# Patient Record
Sex: MALE | Race: WHITE | NOT HISPANIC OR LATINO | Employment: OTHER | URBAN - METROPOLITAN AREA
[De-identification: names, ages, dates, MRNs, and addresses within clinical notes are randomized per-mention and may not be internally consistent; named-entity substitution may affect disease eponyms.]

---

## 2017-04-07 ENCOUNTER — RECORDS - HEALTHEAST (OUTPATIENT)
Dept: LAB | Facility: CLINIC | Age: 52
End: 2017-04-07

## 2017-04-07 LAB
CHOLEST SERPL-MCNC: 183 MG/DL
FASTING STATUS PATIENT QL REPORTED: ABNORMAL
HDLC SERPL-MCNC: 47 MG/DL
LDLC SERPL CALC-MCNC: 105 MG/DL
PSA SERPL-MCNC: 0.8 NG/ML (ref 0–3.5)
TRIGL SERPL-MCNC: 156 MG/DL

## 2017-11-20 ENCOUNTER — HOSPITAL ENCOUNTER (OUTPATIENT)
Dept: BEHAVIORAL HEALTH | Facility: CLINIC | Age: 52
Discharge: HOME | End: 2017-11-20
Attending: SOCIAL WORKER | Admitting: SOCIAL WORKER
Payer: COMMERCIAL

## 2017-11-20 VITALS
DIASTOLIC BLOOD PRESSURE: 80 MMHG | SYSTOLIC BLOOD PRESSURE: 131 MMHG | HEART RATE: 64 BPM | HEIGHT: 70 IN | WEIGHT: 198.4 LBS | BODY MASS INDEX: 28.4 KG/M2

## 2017-11-20 PROCEDURE — H0001 ALCOHOL AND/OR DRUG ASSESS: HCPCS

## 2017-11-20 RX ORDER — BUPRENORPHINE AND NALOXONE 4; 1 MG/1; MG/1
1 FILM, SOLUBLE BUCCAL; SUBLINGUAL DAILY
Status: ON HOLD | COMMUNITY
End: 2018-07-21

## 2017-11-20 ASSESSMENT — ANXIETY QUESTIONNAIRES
6. BECOMING EASILY ANNOYED OR IRRITABLE: SEVERAL DAYS
GAD7 TOTAL SCORE: 1
1. FEELING NERVOUS, ANXIOUS, OR ON EDGE: NOT AT ALL
7. FEELING AFRAID AS IF SOMETHING AWFUL MIGHT HAPPEN: NOT AT ALL
4. TROUBLE RELAXING: NOT AT ALL
5. BEING SO RESTLESS THAT IT IS HARD TO SIT STILL: NOT AT ALL
2. NOT BEING ABLE TO STOP OR CONTROL WORRYING: NOT AT ALL
3. WORRYING TOO MUCH ABOUT DIFFERENT THINGS: NOT AT ALL

## 2017-11-20 ASSESSMENT — PATIENT HEALTH QUESTIONNAIRE - PHQ9: SUM OF ALL RESPONSES TO PHQ QUESTIONS 1-9: 5

## 2017-11-20 ASSESSMENT — PAIN SCALES - GENERAL: PAINLEVEL: SEVERE PAIN (6)

## 2017-11-20 NOTE — PROGRESS NOTES
Rule 25 Assessment  Background Information   1. Date of Assessment Request  2. Date of Assessment  11/20/2017 3. Date Service Authorized     4.   WILL Courtney/LAKEISHA    5.  Phone Number   [phone] 6. Referent  Self 7. Assessment Site  [facility]     8. Client Name   Mark Berumen 9. Date of Birth  1965 Age  52 year old 10. Gender  male  11. PMI/ Insurance No.  [id number]   12. Client's Primary Language:  English 13. Do you require special accommodations, such as an  or assistance with written material? No   14. Current Address: [facility] [address] [facility] [address] [place] [address]   15. Client Phone Numbers: [phone] (home)      16. Tell me what has happened to bring you here today.    The patient came to [facility] for evaluation of possible chemical dependency.  The reason for the CD evaluation was due to the patient's own awareness that he needed help and due to pressure from his girlfriend for him to get help.  The patient reported having multiple issues of substance abuse, mental health issues, chronic health problems as well as significant relationship conflict with his girlfriend of the past 7 years who moved out of the home they had shared in early 11/2017.  The patient has chronic pain issues and he had been working with a [facility] until he was discharged from that program due to having UA's come back positive for non-prescribed mood altering chemicals.  The patient reported he had been abusing his prescribed opiate pain medications and Adderall as well as obtaining and using non-prescribed medications to supplement his substance abuse.  The patient has  from his girlfriend of the past 7 years since early 11/2017 in large part due to his ongoing addiction issues, including taking some of her prescribed medications (Adderall and Oxycodone) without her consent off and on over the past 2-months.  The patient reported his  girlfriend felt very strongly that he needed to enter a residential CD treatment, but he was unwilling to consider a residential CD treatment program at this time.  The patient stated he wanted to enter the Evening Outpatient program at [address] [place] [address] or [facility] for primary CD treatment, so referrals will be made to both of those programs.    ADDENDUM ON 11/27/2017:  The patient called this counselor today and has requested a referral to the residential CD treatment program at [facility] rather than an [facility] CD treatment program as he had initially requested.  The patient had continued to drink alcohol on a daily basis since meeting with this counselor on 11/20/2017 with his last use of alcohol being earlier this morning on 11/27/2017 when he reported drinking 2 beers.  The had also relapsed with Oxycodone over the past 7 days with his last use being earlier this morning on 11/27/2017 when he reported taking two 10 mg tablets of non-prescribed Oxycodone.  This Rule 25 form will be re-faxed to [facility] and to [facility] with the updated referral.    17. Have you had other rule 25 assessments?     Yes. When, Where, and What circumstances: His last CD evaluation was prior to his  CD treatment at [facility] around 5 years ago.    DIMENSION I - Acute Intoxication /Withdrawal Potential   1. Chemical use most recent 12 months outside a facility and other significant use history (client self-report)              X = Primary Drug Used   Age of First Use Most Recent Pattern of Use and Duration   Need enough information to show pattern (both frequency and amounts) and to show tolerance for each chemical that has a diagnosis   Date of last use and time, if needed   Withdrawal Potential? Requiring special care Method of use  (oral, smoked, snort, IV, etc)      Alcohol     15 HU: [age], when he reported a pattern of drinking a few beers and 1 pint of Tequila on a daily basis.    From 7/2017 until 9/2017, he reported a  pattern of drinking 6 beers and occasionally 1 pint of Tequila on a daily basis.    Since 9/2017, he reported a pattern of drinking a few beers or a pint of Tequila around 1 time per week on average.    UPDATE ON 11/27/2017:  Since participating in the CD evaluation on 11/20/2017, he reported a pattern of drinking 4-6 beers and several shots of Tequila on a daily basis.    11/27/2017    (2 beers until 11 am)   None Oral      Marijuana/  Hashish   15 HU: [age], when he reported a pattern of smoking a few bowls of THC on a daily basis.    Within the past year, he reported using THC on around 50 occasions and a portion of those 50 occasions were when he had consumed THC in brownies or other edibles in an attempt to manage his chronic pain issues.    2 months ago None Smoke      Cocaine/Crack     19 HU: 19-26, when he reported a pattern of snorting 1/2 gram of powder cocaine on an almost daily basis.    He reported having no use of cocaine since age 34.   34 None Snort      Meth/  Amphetamines   22        45 He reported snorting methamphetamine on 12 occassions in his life.    HU: Past 3-months, when he reported a pattern of snorting 4 tablets of his prescribed Adderall supplemented with Adderall he was obtaining elsewhere on a daily basis.    Prior to 3-months, he reported taking his prescribed Adderall mostly as prescribed with occasional overuse or abuse of his prescribed Adderall between 5-10 times per month on average.    24        11/16/2017    (1 tablet) None        None Snort        Oral and snorted      Heroin     N/A          X Other Opiates/  Synthetics   46 HU: For a 6-week period of time at age 46, when he reported a pattern of using up to 30 tablets of Oxycodone on a daily basis.    Prior 8/2017, he reported a pattern of using 4 tablets of his prescribed Percocet on a daily basis.    He reported overusing up to 6-7 tablets of prescribed Percocet supplemented with non-prescribed opiate pain medication he  was getting off of the street a few times per week on average.    He was taken off of his prescribed Percocet in 8/2017 due to having positive UA's for non-prescribed mood altering chemicals.    Since 8/2017, he had been prescribed 8 mg of Suboxone and sometimes he would take additional Buprenorphine he was getting from a friend to supplement his use.    He also reported stopping the use of his prescribed Suboxone for a few days at a time when he would take non-prescribed opiate pain medications a few times per month, when he reported taking between 3-4 tablets of non-prescribed Oxycodone per day.    The patient's prescribed Suboxone ran out a month ago, but he had taking non-prescribed Buprenorphine on 4-5 occassions in the past month.    UPDATE ON 11/27/2017:  Since participating in the CD evaluation on 11/20/2017, he reported a pattern of using between 1-2 (10 mg) tablets of non-prescribed Oxycodone over the past 7 days.    11/27/2017    (two 10 mg tablets of non-RX'd Oxycodone at 10 am)   None Oral      Inhalants     N/A           Benzodiazepines     N/A           Hallucinogens     18 LSD: 12 times in life.  Mushrooms: 3-4 times in life. 23 None Oral      Barbiturates/  Sedatives/  Hypnotics N/A           Over-the-Counter Drugs   N/A           Other     21 MDMA: 100 times in life between the ages of 21 and 24. 24 None Oral      Nicotine     10 He reported a current pattern of smoking 1 pack of cigarettes on a daily basis.   11/27/2017 None Smoke     2. Do you use greater amounts of alcohol/other drugs to feel intoxicated or achieve the desired effect?  Yes.  Or use the same amount and get less of an effect?  Yes.  Example: The patient reported using increased amounts of opiate pain medications.    3A. Have you ever been to detox? Yes    3B. When was the first time? 2009    3C. How many times since then? 5    3D. Date of most recent detox: 5 years ago    4.  Withdrawal symptoms: Have you had any of the following  withdrawal symptoms?  Past 12 months Recent (past 30 days)   Sweating (Rapid Pulse)  Shaky / Jittery / Tremors  Headache  Fatigue / Extremely Tired  Sad / Depressed Feeling  Muscle Aches  Irritability  High Blood Pressure  Nausea / Vomiting  Diarrhea  Diminished Appetite  Unable to Eat None     's Visual Observations and Symptoms: No visible withdrawal symptoms at this time    Based on the above information, is withdrawal likely to require attention as part of treatment participation?  No    Dimension I Ratings   Acute intoxication/Withdrawal potential - The placing authority must use the criteria in Dimension I to determine a client s acute intoxication and withdrawal potential.    RISK DESCRIPTIONS - Severity ratin    REASONS SEVERITY WAS ASSIGNED (What about the amount of the person s use and date of most recent use and history of withdrawal problems suggests the potential of withdrawal symptoms requiring professional assistance? )     UPDATE ON 2017:    The patient may have some mild to moderate symptoms of withdrawal as he had been back to drinking alcohol and using non-prescribed Oxycodone on a daily basis for the past 7 days.  His last use of both was earlier this morning on 2017 so he reported he had not yet started to experience any symptoms of withdrawal.  The patient would benefit from completing a detoxification on his own prior to CD treatment if he is able or under medical supervision at [facility] as available.  The patient was instructed to go to an Emergency Department if he started to experience significant withdrawal symptoms to be evaluated for an IP detoxification admission.         DIMENSION II - Biomedical Complications and Conditions   1. Do you have any current health/medical conditions?(Include any infectious diseases, allergies, or chronic or acute pain, history of chronic conditions)       Yes.   Illnesses/Medical Conditions you are receiving care for:   Past  Medical History:   Diagnosis Date     ADD (attention deficit disorder)      Arthritis      Chronic pain      Substance abuse     Etoh     Uncomplicated asthma      2. Do you have a health care provider? When was your most recent appointment? What concerns were identified?     The patient's PCP is Dr. Schultz.    3. If indicated by answers to items 1 or 2: How do you deal with these concerns? Is that working for you? If you are not receiving care for this problem, why not?      The patient is taking medications as prescribed.    4A. List current medication(s) including over-the-counter or herbal supplements--including pain management:     Current Outpatient Prescriptions   Medication     buprenorphine HCl-naloxone HCl (SUBOXONE) 4-1 MG per film     Amphetamine-Dextroamphetamine (ADDERALL PO)     No current facility-administered medications for this encounter.      4B. Do you follow current medical recommendations/take medications as prescribed?     No, please explain: He had been abusing his prescribed Adderall.    4C. When did you last take your medication?     Month ago was the last time he took his prescribed Suboxone.    5. Has a health care provider/healer ever recommended that you reduce or quit alcohol/drug use?     Yes    6. Are you pregnant?     No    7. Have you had any injuries, assaults/violence towards you, accidents, health related issues, overdose(s) or hospitalizations related to your use of alcohol or other drugs:     Yes, explain: He has had several IP detoxification admissions.    8. Do you have any specific physical needs/accommodations? No    Dimension II Ratings   Biomedical Conditions and Complications - The placing authority must use the criteria in Dimension II to determine a client s biomedical conditions and complications.   RISK DESCRIPTIONS - Severity ratin Client tolerates and mark with physical discomfort and is able to get the services that the client needs.    REASONS SEVERITY  WAS ASSIGNED (What physical/medical problems does this person have that would inhibit his or her ability to participate in treatment? What issues does he or she have that require assistance to address?)    The patient reported having a history of the above medical issues and he denied having any other history of chronic medical problems.  The patient had been taking the above medications, but he denied taking either of those medications at this time.  He plans to discontinue his use of Adderall and he is uncertain if he will eventually resume his use of his prescribed Suboxone.  The patient would benefit from following all of the recommendations of his medical providers.          DIMENSION III - Emotional, Behavioral, Cognitive Conditions and Complications   1. (Optional) Tell me what it was like growing up in your family. (substance use, mental health, discipline, abuse, support)     Family History   Problem Relation Age of Onset     Substance Abuse Father      Substance Abuse Paternal Grandfather      Substance Abuse Sister      Substance Abuse Brother      Depression Brother      Anxiety Disorder Brother      Substance Abuse Son      2. When was the last time that you had significant problems...  A. with feeling very trapped, lonely, sad, blue, depressed or hopeless  about the future? Past Month    B. with sleep trouble, such as bad dreams, sleeping restlessly, or falling  asleep during the day? 1+ years ago    C. with feeling very anxious, nervous, tense, scared, panicked, or like  something bad was going to happen? 1+ years ago    D. with becoming very distressed and upset when something reminded  you of the past? Past Month    E. with thinking about ending your life or committing suicide? Never    3. When was the last time that you did the following things two or more times?  A. Lied or conned to get things you wanted or to avoid having to do  something? Past Month    B. Had a hard time paying attention at  school, work, or home? Past Month      C. Had a hard time listening to instructions at school, work, or home? Past Month    D. Were a bully or threatened other people? Never    E. Started physical fights with other people? Never    Note: These questions are from the Global Appraisal of Individual Needs--Short Screener. Any item marked  past month  or  2 to 12 months ago  will be scored with a severity rating of at least 2.     For each item that has occurred in the past month or past year ask follow up questions to determine how often the person has felt this way or has the behavior occurred? How recently? How has it affected their daily living? And, whether they were using or in withdrawal at the time?    The patient reported having a history of Depression NOS in the remote past, but he denied having any recent formal mental health diagnosis and he last took psychotropic medications in 2012.  The patient reported he had been working with a 1:1 mental health therapist on a weekly basis for the past 6-months to help address some of his personal issues.  The patient has difficulty with impulse control and he lacks sober coping skills.  The patient reported having a history of trauma issues related to his best friend killing himself when he was just 20 years old, related to his brother passing away suddenly and unexpectedly from an accidental drug overdose in 9/2017, and related to being severely injured 3 years ago when a car he was working on fell on him.  The patient also reported having some grief and loss issues regarding the recent separation from his girlfriend of the past 7 years in early 11/2017 and due to being away from the 6 year old triplets he had been helping his girlfriend raise since they were born.  The patient reported having significant grief and loss issues regarding his brother's death.  The patient denied having any history of SIB, suicide attempts or suicide ideation.  The patient would benefit  from having a mental health evaluation to rule out current clinical mental health issues and from following all of the recommendations of his mental health providers.    4A. If the person has answered item 2E with  in the past year  or  the past month , ask about frequency and history of suicide in the family or someone close and whether they were under the influence.     The patient denied having any history of SIB, suicide attempts or suicide ideation.    Any history of suicide in your family? Or someone close to you?     Yes, explain: His best friend committed suicide when they were 20 years old.    4B. If the person answered item 2E  in the past month  ask about  intent, plan, means and access and any other follow-up information  to determine imminent risk. Document any actions taken to intervene  on any identified imminent risk.      The patient denied having any history of SIB, suicide attempts or suicide ideation.    5A. Have you ever been diagnosed with a mental health problem?     Yes, If yes explain: The patient reported having a history of Depression NOS in the remote past, but he denied having any recent formal mental health diagnosis and he last took psychotropic medications in 2012.      5B. Are you receiving care for any mental health issues? If yes, what is the focus of that care or treatment?  Are you satisfied with the service? Most recent appointment?  How has it been helpful?     The patient reported he had been working with a 1:1 mental health therapist on a weekly basis for the past 6-months to help address some of his personal issues.    6. Have you been prescribed medications for emotional/psychological problems?     None at this time.    7. Does your MH provider know about your use?     NA    8A. Have you ever been verbally, emotionally, physically or sexually abused?      No     Follow up questions to learn current risk, continuing emotional impact.      NA    8B. Have you received  counseling for abuse?      N/A    9. Have you ever experienced or been part of a group that experienced community violence, historical trauma, rape or assault?     No    10A. :    No    11. Do you have problems with any of the following things in your daily life?    Concentration and In relationships with others    Note: If the person has any of the above problems, follow up with items 12, 13, and 14. If none of the issues in item 11 are a problem for the person, skip to item 15.    The patient would benefit from developing sober coping skills.    12. Have you been diagnosed with traumatic brain injury or Alzheimer s?  No    13. If the answer to #12 is no, ask the following questions:    Have you ever hit your head or been hit on the head? Yes    Were you ever seen in the Emergency Room, hospital or by a doctor because of an injury to your head? Yes    Have you had any significant illness that affected your brain (brain tumor, meningitis, West Nile Virus, stroke or seizure, heart attack, near drowning or near suffocation)? No    14. If the answer to #12 is yes, ask if any of the problems identified in #11 occurred since the head injury or loss of oxygen. NA    15A. Highest grade of school completed:     Some college, but no degree (2 years of college) and a trade school degree    15B. Do you have a learning disability? Yes    15C. Did you ever have tutoring in Math or English? No    15D. Have you ever been diagnosed with Fetal Alcohol Effects or Fetal Alcohol Syndrome? No    16. If yes to item 15 B, C, or D: How has this affected your use or been affected by your use?     Being more impulsive with his use of mood altering chemicals and a history of abusing his prescribed ADD medications.    Dimension III Ratings   Emotional/Behavioral/Cognitive - The placing authority must use the criteria in Dimension III to determine a client s emotional, behavioral, and cognitive conditions and complications.   RISK  DESCRIPTIONS - Severity ratin Client has difficulty with impulse control and lacks coping skills. Client has thoughts of suicide or harm to others without means; however, the thoughts may interfere with participation in some treatment activities. Client has difficulty functioning in significant life areas. Client has moderate symptoms of emotional, behavioral, or cognitive problems. Client is able to participate in most treatment activities.    REASONS SEVERITY WAS ASSIGNED - What current issues might with thinking, feelings or behavior pose barriers to participation in a treatment program? What coping skills or other assets does the person have to offset those issues? Are these problems that can be initially accommodated by a treatment provider? If not, what specialized skills or attributes must a provider have?    The patient reported having a history of Depression NOS in the remote past, but he denied having any recent formal mental health diagnosis and he last took psychotropic medications in .  The patient reported he had been working with a 1:1 mental health therapist on a weekly basis for the past 6-months to help address some of his personal issues.  The patient has difficulty with impulse control and he lacks sober coping skills.  The patient reported having a history of trauma issues related to his best friend killing himself when he was just 20 years old, related to his brother passing away suddenly and unexpectedly from an accidental drug overdose in 2017, and related to being severely injured 3 years ago when a car he was working on fell on him.  The patient also reported having some grief and loss issues regarding the recent separation from his girlfriend of the past 7 years in early 2017 and due to being away from the 6 year old triplets he had been helping his girlfriend raise since they were born.  The patient reported having significant grief and loss issues regarding his brother's  death.  The patient denied having any history of SIB, suicide attempts or suicide ideation.  The patient would benefit from having a mental health evaluation to rule out current clinical mental health issues and from following all of the recommendations of his mental health providers.         DIMENSION IV - Readiness for Change   1. You ve told me what brought you here today. (first section) What do you think the problem really is?     Multiple issues of substance abuse, mental health and chronic pain issues.    2. Tell me how things are going. Ask enough questions to determine whether the person has use related problems or assets that can be built upon in the following areas: Family/friends/relationships; Legal; Financial; Emotional; Educational; Recreational/ leisure; Vocational/employment; Living arrangements (DSM)      The patient is currently living alone in single family home.  He reported his girlfriend of the past 7 years had been living there with him with her 6 year old triplets until moving out of the home to a town house in early 11/2017.  He reported his girlfriend has a history of substance abuse and she had a recent slip with drinking alcohol during this past summer, but she is not currently abusing any mood altering chemicals.  The patient reported having significant relationship conflict with his girlfriend due to his substance abuse issues and they are currently  in large part due to his ongoing substance abuse issues.  The patient reported that his girlfriend would be very supportive of him abstaining from all non-prescribed mood altering chemicals.  The patient denied having any concerns regarding his immediate living environment and/or neighborhood, but he had been unable to maintain abstinence from non-prescribed mood altering chemicals while living there.  The patient has 2 adult sons ages 26 and 22 who live on their own.  The patient also reported helping raise his girlfriend's 6  year old triplets since they were born.  The patient denied having any recent attendance at 12-step support groups and he lacks a current sober peer support network.  The patient reported having a history of 3 DWI charges (the last DWI was at age 47) and 1 drug possession charge around 5 years ago.  He denied having any current or pending legal issues and he denied being on court probation at this time.  The patient reported most of his abuse of opiate pain medications and other mood altering chemicals had been done alone with attempts to hide his substance abuse from others.  The patient reported working full-time as a  for the past 26 years.  He reported his work environment is stable and his job is not at risk at this time.  The patient reported having some increased financial stress due to money being tight and due to having some issues with the [employer] because he had not filed his taxes for the past few years.    3. What activities have you engaged in when using alcohol/other drugs that could be hazardous to you or others (i.e. driving a car/motorcycle/boat, operating machinery, unsafe sex, sharing needles for drugs or tattoos, etc     The patient reported having a history of driving while under the influnece of alcohol or drugs.    4. How much time do you spend getting, using or getting over using alcohol or drugs? (DSM)     Binge pattern and up to daily use.    5. Reasons for drinking/drug use (Use the space below to record answers. It may not be necessary to ask each item.)  Like the feeling Yes   Trying to forget problems Yes   To cope with stress Yes   To relieve physical pain Yes   To cope with anxiety No   To cope with depression No   To relax or unwind Yes   Makes it easier to talk with people No   Partner encourages use No   Most friends drink or use No   To cope with family problems Yes   Afraid of withdrawal symptoms/to feel better Yes   Other (specify)  No     A. What concerns other people  about your alcohol or drug use/Has anyone told you that you use too much? What did they say? (DSM)     His girlfriend and his sister had been sharing concern with him about his substance abuse.    B. What did you think about that/ do you think you have a problem with alcohol or drug use?     He agrees he has a substance abuse problem.    6. What changes are you willing to make? What substance are you willing to stop using? How are you going to do that? Have you tried that before? What interfered with your success with that goal?      His plan and goal is to abstain from non-prescribed all mood altering chemicals.     7. What would be helpful to you in making this change?     Entering an evening outpatient treatment program for primary CD treatment, developing sober coping skills, developing long-term sober maintenance skills, developing a sober peer support network and addressing dual issues of MI and CD.     Dimension IV Ratings   Readiness for Change - The placing authority must use the criteria in Dimension IV to determine a client s readiness for change.   RISK DESCRIPTIONS - Severity rating: 3 Client displays inconsistent compliance, minimal awareness of either the client s addiction or mental disorder, and is minimally cooperative.    REASONS SEVERITY WAS ASSIGNED - (What information did the person provide that supports your assessment of his or her readiness to change? How aware is the person of problems caused by continued use? How willing is she or he to make changes? What does the person feel would be helpful? What has the person been able to do without help?)      The patient displayed verbal compliance to abstain from all mood altering chemicals, but he had lacked consistent behavior to support abstinence, including failing to attend 12-step support group meetings despite having prior CD treatment and having poor follow through with prior CD treatment recommendations.  The patient did appear to be  willing to enter an Evening Outpatient treatment program at [address] [place] [address] for primary CD treatment.         DIMENSION V - Relapse, Continued Use, and Continued Problem Potential   1. In what ways have you tried to control, cut-down or quit your use? If you have had periods of sobriety, how did you accomplish that? What was helpful? What happened to prevent you from continuing your sobriety? (DSM)     He reported having around 8 years from age 34 to age 42 when he was attending 12-step support group meetings and was very active in his recovery.  He reported living with an alcoholic 8 years ago had been the trigger for relapse at that time and he reported self-medicating his pain issues had been his relapse triggers within the past few years.    2. Have you experienced cravings? If yes, ask follow up questions to determine if the person recognizes triggers and if the person has had any success in dealing with them.     The patient reported having cravings to use mood altering chemicals on a daily basis.    3. Have you been treated for alcohol/other drug abuse/dependence?     Yes  3B. Number of times(lifetime) (over what period) 5.   3C. Number of times completed treatment (lifetime) 4.   3D. During the past three years have you participated in outpatient and/or residential?  Yes  3E. When and where? [facility] 2 years ago, but he didn't complete that treatment program.  3F. What was helpful? What was not? The group therapy had been very helpful for him.    4. Support group participation: Have you/do you attend support group meetings to reduce/stop your alcohol/drug use? How recently? What was your experience? Are you willing to restart? If the person has not participated, is he or she willing?     The patient denied having any recent attendance at 12-step support group meetings.    5. What would assist you in staying sober/straight?     Entering an evening outpatient treatment program for  primary CD treatment, developing sober coping skills, developing long-term sober maintenance skills, developing a sober peer support network and addressing dual issues of MI and CD.     Dimension V Ratings   Relapse/Continued Use/Continued problem potential - The placing authority must use the criteria in Dimension V to determine a client s relapse, continued use, and continued problem potential.   RISK DESCRIPTIONS - Severity rating: 3 Client has poor recognition and understanding of relapse and recidivism issues and displays moderately high vulnerability for further substance use or mental health problems. Client has few coping skills and rarely applies coping skills.    REASONS SEVERITY WAS ASSIGNED - (What information did the person provide that indicates his or her understanding of relapse issues? What about the person s experience indicates how prone he or she is to relapse? What coping skills does the person have that decrease relapse potential?)      The patient had continued to abuse mood altering chemicals despite having negative consequences in many life areas, including having medical and mental health issues that were exacerbated by his substance abuse.  The patient lacks sober coping skills and he lacks long-term sober maintenance skills.  The patient has a history of having dual issues of MI and CD.  The patient lacks a current sober peer support network.         DIMENSION VI - Recovery Environment   1. Are you employed/attending school? Tell me about that.     The patient reported working full-time as a  for the past 26 years.  He reported his work environment is stable and his job is not at risk at this time.     2A. Describe a typical day; evening for you. Work, school, social, leisure, volunteer, spiritual practices. Include time spent obtaining, using, recovering from drugs or alcohol. (DSM)     Get up with the kids and get them off to school/, going to work, head home and take  care of the kids and watch TV later in the evening.     2B. How often do you spend more time than you planned using or use more than you planned? (DSM)     Any abuse of mood altering chemicals is more than intended at this time.    3. How important is using to your social connections? Do many of your family or friends use?     Not important to any social connections, if anything his substance abuse caused him to be more socially isolated.    4A. Are you currently in a significant relationship?     Yes.  4B. How long? He is currently  from his girlfriend of the past 7 years.    4C. Sexual Orientation:     Heterosexual    5A. Who do you live with?      Alone    5B. Tell me about their alcohol/drug use and mental health issues.     NA    5C. Are you concerned for your safety there? No    5D. Are you concerned about the safety of anyone else who lives with you? No    6A. Do you have children who live with you? No    6B. Do you have children who do not live with you?     Yes.  (Ask follow up questions to learn where the children are, who has custody and what the person s relationship and responsibility is with these children and what hopes the person has for his or her future with these children.)     The patient has 2 adult sons ages 26 and 22 who live on their own.  The patient also reported helping raise his girlfriend's 6 year old triplets since they were born.     7A. Who supports you in making changes in your alcohol or drug use? What are they willing to do to support you? Who is upset or angry about you making changes in your alcohol or drug use? How big a problem is this for you?      Most of the people in his life are supportive of him stopping his substance abuse.    7B. This table is provided to record information about the person s relationships and available support It is not necessary to ask each item; only to get a comprehensive picture of their support system.  How often can you count on the  following people when you need someone?   Partner / Spouse Always supportive   Parent(s)/Aunt(s)/Uncle(s)/Grandparents Always supportive   Sibling(s)/Cousin(s) Always supportive   Child(jennifer) Always supportive   Other relative(s) Usually supportive   Friend(s)/neighbor(s) Always supportive   Child(jennifer) s father(s)/mother(s) Never supportive   Support group member(s) Always supportive   Community of ana lilia members N/A   /counselor/therapist/healer Always supportive   Other (specify) N/A     8A. What is your current living situation?     The patient is currently living alone in single family home.  He reported his girlfriend of the past 7 years had been living there with him with her 6 year old triplets until moving out of the home to a town house in early 11/2017.  He reported his girlfriend has a history of substance abuse and she had a recent slip with drinking alcohol during this past summer, but she is not currently abusing any mood altering chemicals.  The patient reported having significant relationship conflict with his girlfriend due to his substance abuse issues and they are currently  in large part due to his ongoing substance abuse issues.  The patient reported that his girlfriend would be very supportive of him abstaining from all non-prescribed mood altering chemicals.      8B. What is your long term plan for where you will be living?     The patient is planning to move into a smaller place, but to stay in the same area.    8C. Tell me about your living environment/neighborhood? Ask enough follow up questions to determine safety, criminal activity, availability of alcohol and drugs, supportive or antagonistic to the person making changes.      The patient denied having any concerns regarding his immediate living environment and/or neighborhood, but he had been unable to maintain abstinence from non-prescribed mood altering chemicals while living there.    9. Criminal justice history:  Gather current/recent history and any significant history related to substance use--Arrests? Convictions? Circumstances? Alcohol or drug involvement? Sentences? Still on probation or parole? Expectations of the court? Current court order? Any sex offenses - lifetime? What level? (DSM)    The patient reported having a history of 3 DWI charges (the last DWI was at age 47) and 1 drug possession charge around 5 years ago.  He denied having any current or pending legal issues and he denied being on court probation at this time.     10. What obstacles exist to participating in treatment? (Time off work, childcare, funding, transportation, pending nursing home time, living situation)     None    Dimension VI Ratings   Recovery environment - The placing authority must use the criteria in Dimension VI to determine a client s recovery environment.   RISK DESCRIPTIONS - Severity rating: 3 Client is not engaged in structured, meaningful activity and the client s peers, family, significant other, and living environment are unsupportive, or there is significant criminal justice system involvement.    REASONS SEVERITY WAS ASSIGNED - (What support does the person have for making changes? What structure/stability does the person have in his or her daily life that will increase the likelihood that changes can be sustained? What problems exist in the person s environment that will jeopardize getting/staying clean and sober?)     The patient is currently living alone in single family home.  He reported his girlfriend of the past 7 years had been living there with him with her 6 year old triplets until moving out of the home to a town house in early 11/2017.  He reported his girlfriend has a history of substance abuse and she had a recent slip with drinking alcohol during this past summer, but she is not currently abusing any mood altering chemicals.  The patient reported having significant relationship conflict with his girlfriend due to his  substance abuse issues and they are currently  in large part due to his ongoing substance abuse issues.  The patient reported that his girlfriend would be very supportive of him abstaining from all non-prescribed mood altering chemicals.  The patient denied having any concerns regarding his immediate living environment and/or neighborhood, but he had been unable to maintain abstinence from non-prescribed mood altering chemicals while living there.  The patient has 2 adult sons ages 26 and 22 who live on their own.  The patient also reported helping raise his girlfriend's 6 year old triplets since they were born.  The patient denied having any recent attendance at 12-step support groups and he lacks a current sober peer support network.  The patient reported having a history of 3 DWI charges (the last DWI was at age 47) and 1 drug possession charge around 5 years ago.  He denied having any current or pending legal issues and he denied being on court probation at this time.  The patient reported most of his abuse of opiate pain medications and other mood altering chemicals had been done alone with attempts to hide his substance abuse from others.  The patient reported working full-time as a  for the past 26 years.  He reported his work environment is stable and his job is not at risk at this time.  The patient reported having some increased financial stress due to money being tight and due to having some issues with the [employer] because he had not filed his taxes for the past few years.         Client Choice/Exceptions   Would you like services specific to language, age, gender, culture, Jain preference, race, ethnicity, sexual orientation or disability?  No    What particular treatment choices and options would you like to have? Intensive Outpatient CD treatment    Do you have a preference for a particular treatment program? [facility]    Criteria for Diagnosis     Criteria for  Diagnosis  DSM-5 Criteria for Substance Use Disorder  Instructions: Determine whether the client currently meets the criteria for Substance Use Disorder using the diagnostic criteria in the DSM-V pp.[id number]. 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    If court related records were reviewed, summarize here: NA    Information from collateral contacts supported/largely agreed with information from the client and associated risk ratings.      Rule 25 Assessment Summary and Plan   's Recommendation    ADDENDUM/UPDATE ON 11/27/2017    1.)  It was recommended that the patient abstain from alcohol and all other non-prescribed mood altering chemicals.   2.)  Have a mental health evaluation to rule out current clinical mental health issues.  3.)  Follow all of the recommendations of his medical and mental health providers.  4.)  Complete a detoxification on his own prior to CD treatment if he is able or under medical supervision at [facility] as available.  5.)  Enter the residential CD treatment program at [facility] in [place], [place] for primary CD treatment.  6.)  Follow all of the recommendations of his chemical dependency treatment providers.  7.)  Attend support group meetings on a weekly  basis.      Collateral Contacts     Name:    Electronic medical records at [place]   Relationship:    [place]   Phone Number:    NA Releases:    No     The patient's Electronic medical records at [place] were reviewed and the information contained in the medical records supported the patient's account of his chemical use history and his chemical use consequences.      Collateral Contacts     Name:    Erinn Shay   Relationship:    Girlfriend   Phone Number:    [phone] Releases:    Yes     Collateral data had not yet been obtained from this contact at the time of this documentation.    ollateral Contacts      A problematic pattern of alcohol/drug use leading to clinically significant impairment or distress, as manifested by at least two of the following, occurring within a 12-month period:    Alcohol/drug is often taken in larger amounts or over a longer period than was intended.  There is a persistent desire or unsuccessful efforts to cut down or control alcohol/drug use  A great deal of time is spent in activities necessary to obtain alcohol, use alcohol, or recover from its effects.  Craving, or a strong desire or urge to use alcohol/drug  Recurrent alcohol/drug use resulting in a failure to fulfill major role obligations at work, school or home.  Continued alcohol use despite having persistent or recurrent social or interpersonal problems caused or exacerbated by the effects of alcohol/drug.  Important social, occupational, or recreational activities are given up or reduced because of alcohol/drug use.  Recurrent alcohol/drug use in situations in which it is physically hazardous.  Alcohol/drug use is continued despite knowledge of having a persistent or recurrent physical or psychological problem that is likely to have been caused or exacerbated by alcohol.  Tolerance, as defined by either of the following: A need for markedly increased amounts of alcohol/drug to achieve intoxication or desired  effect.  Withdrawal, as manifested by either of the following: The characteristic withdrawal syndrome for alcohol/drug (refer to Criteria A and B of the criteria set for alcohol/drug withdrawal).      Specify if: In early remission:  After full criteria for alcohol/drug use disorder were previously met, none of the criteria for alcohol/drug use disorder have been met for at least 3 months but for less than 12 months (with the exception that Criterion A4,  Craving or a strong desire or urge to use alcohol/drug  may be met).     In sustained remission:   After full criteria for alcohol use disorder were previously met, non of the criteria for alcohol/drug use disorder have been met at any time during a period of 12 months or longer (with the exception that Criterion A4,  Craving or strong desire or urge to use alcohol/drug  may be met).   Specify if:   This additional specifier is used if the individual is in an environment where access to alcohol is restricted.    Mild: Presence of 2-3 symptoms    Moderate: Presence of 4-5 symptoms    Severe: Presence of 6 or more symptoms

## 2017-11-20 NOTE — PROGRESS NOTES
[facility]  [address] [place] [id number]        ADULT CD ASSESSMENT ADDENDUM      Patient Name: Mark Berumen  Cell Phone:   Home: [phone] (home)    Mobile:   No relevant phone numbers on file.       Email:  [email or web address]  Emergency Contact: Erinn Shay (Girlfriend) Tel: [phone]    ________________________________________________________________________      The patient is      With which race do you identify? White    Family History   Mother   Living Father      No Step-mother   NA No Step-father   NA   Maternal Grandmother    Fraternal Grandmother    Maternal Grandfather     Fraternal   Grandfather    1 Sister(s)   Living 2 Brother(s)   1 Living and 1    No Half-sister(s)   NA No Half-brother(s)   NA             Who raised you? (parents, grandparents, adoptive parents, step-parents, etc.)    Both Parents    Have any of your family members or significant others had problems with mental illness or substance abuse?  Please explain.    Family History   Problem Relation Age of Onset     Substance Abuse Father      Substance Abuse Paternal Grandfather      Substance Abuse Sister      Substance Abuse Brother      Depression Brother      Anxiety Disorder Brother      Substance Abuse Son      Do you have any children or Stepchildren?     Yes, please explain: The patient has 2 adult sons ages 26 and 22.  The patient also reported helping raising his girlfriend's 6 year old triplets since they were born.      Are you being investigated by Child Protection Services? No    Do you have a child protection worker, probation office or ? No    How would you describe your current finances?  Just making it    If you are having problems, (unpaid bills, bankruptcy, IRS problems) please explain:      Yes, please explain: The patient reported having some increased financial stress due to money being tight and due to having  some issues with the IRS due to not filing his taxes for the past few years.    If working or a student are you able to function appropriately in that setting? Yes    Describe your preferred learning style:  by hands-on practice    What personal strengths do you have that can help you get sober?  He is committed to getting back on track with sobriety and he is very social.    Do you currently self-administer your medications?  Yes    Have you ever had to lie to people important to you about how much you webster?     Yes, If yes explain: in the remote past around 10 years ago.   Have you ever felt the need to bet more and more money?     Yes, If yes explain:  in the remote past around 10 years ago.     Have you ever attempted treatment for a gambling problem?     No   Have you ever touched or fondled someone else inappropriately or forced them to have sex with you against their will?     No   Are you or have you ever been a registered sex offender?     No   Is there any history of sexual abuse in your family?     No   Have you ever felt obsessed by your sexual behavior, such as having sex with many partners, masturbating often, using pornography often?     No   Have you ever received therapy or stayed in the hospital for mental health problems?     No   Have you ever hurt yourself, such as cutting, burning or hitting yourself?     No   Have you ever purged, binged or restricted yourself as a way to control your weight?     No   Are you on a special diet?     No   Do you have any concerns regarding your nutritional status?     No   Have you had any appetite changes in the last 3 months?     No   Have you had any weight loss or weight gain in the last 3 months?    If weight patient gained or lost was more than 10 lbs, then refer to program RN / attending Physician for assessment.     No   Was the patient informed of BMI?    Above,  General nutrition education     Yes   Do you have any dental problems?     No   Have you  ever lived through any trauma or stressful life events?     Yes, If yes explain: The patient reported having a history of trauma issues related to his best friend killing himself when he was just 20 years old, related to his brother passing away suddenly and unexpectedly from an accidental drug overdose in 9/2017, and related to being severely injured 3 years ago when a car he was working on fell on him.  The patient also reported having some grief and loss issues regarding the recent separation from his girlfriend of the past 7 years in early 11/2017 and due to being away from the 6 year old triplets he had been helping his girlfriend raise since they were born.  The patient reported having significant grief and loss issues regarding his brother's death.     In the past month, have you had any of the following symptoms related to the trauma listed above? (dreams, intense memories, flashbacks, physical reactions, etc.)     No   Have you ever believed people were spying on you, or that someone was plotting against you or trying to hurt you?     No   Have you ever believed someone was reading your mind or could hear your thoughts or that you could actually read someone's mind or hear what another person was thinking?     No   Have you ever believed that someone of some force outside of yourself was putting thoughts into your mind or made you act in a way that was not your usual self?  Have you ever though you were possessed?     No   Have you ever believed you were being sent special messages through the TV, radio or newspaper?     No   Have you ever heard things other people couldn't hear, such as voices or other noises?     No   Have you ever had visions when you were awake?  Or have you ever seen things other people couldn't see?     No       Suicide Screening Questions:   1. Are you feeling hopeless about the present/future?     No   2. Have you ever had thoughts about taking your life?     No   3. When did you  "have these thoughts?     NA   4. Do you have any current intent or active desire to take your life?     No   5. Do you have a plan to take your life?     No   6. Have you ever made a suicide attempt?     No   7. Do you have access to pills, guns or other methods to kill yourself?     No     Guide to Risk Ratings   IDEATION: Active thoughts of suicide? INTENT: Intent to follow on suicide? PLAN: Plan to follow through on suicide? Level of Risk:   IF Yes Yes Yes Patient = High Emergent   IF Yes Yes No Patient = High Urgent/Non-Emergent   IF Yes No No Patient = Moderate Non-Urgent   IF No No   No Patient = Low Risk   The patient's ADDITIONAL RISK FACTORS and lack of PROTECTIVE FACTORS may increase their overall suicide risk ratings.     Patient's Responses (within the last 30 days)   IDEATION: Active thoughts of suicide?    No     INTENT: Intent to follow on suicide?    No     PLAN: Plan to follow through on suicide?    No     Determining the level of risk depends on the patient responses, suicide risk factors and protective factors.     Additional Risk Factors: Significant history of having untreated or poorly treated mental health symptoms  Tendency to be socially isolated and/or cut off from the support of others  A recent death of someone close to the patient and/or unresolved grief and loss issues  A recent loss that was significant to the patient, i.e. loss of job, loss of home, divorce, break-up, etc.  History of impulsive or aggressive behaviors   Protective Factors:  Having people in his/her life that would prevent the patient from considering committing suicide (i.e. young children, spouse, parents, etc.)  Having easy access to supportive family members  Having cultural, Christian or spiritual beliefs that discourage suicide     Risk Status   Emergent? No   Urgent / Non-Emergent? No   Present / Non- Urgent? Yes, Document in Epic / SBAR to counselor, Collaborate with patient / client to develop \"Patient Safety " "Plan\", Referral to PCP or psychiatrist, Address in Treatment Plan, Continuous monitoring, assessment and intervention and Address in Discharge / Transition Plan    Low Risk? See above   Additional information to support suicide risk rating: See Above       Mental Health Status   Physical Appearance/Attire: Appears stated age   Hygiene: well groomed   Eye Contact: at examiner   Speech Rate:  regular   Speech Volume: regular   Speech Quality: fluid   Cognitive/Perceptual:  reality based   Cognition: memory intact    Judgment: intact   Insight: intact   Orientation:  time, place, person and situation   Thought::   logical    Hallucinations:  none   General Behavioral Tone: cooperative   Psychomotor Activity: no problem noted   Gait:  no problem   Mood: appropriate   Affect: congruence/appropriate   Counselor Notes: NA       Criteria for Diagnosis: DSM-5 Criteria for Substance Use Disorders      1.)  Opioid Use Disorder Severe - 304.00 (F11.20)  2.)  Amphetamine Use Disorder Severe - 304.40 (F15.20)  3.)  Alcohol Use Disorder Severe - 303.90 (F10.20)  4.)  Cannabis Use Disorder Moderate - 304.30 (F12.20)  5.)  Cocaine Use Disorder Severe - 304.20 (F14.20) in sustained remission  6.)  Tobacco Use Disorder Moderate - 305.10 (F17.200)  7.)  Rule out Depression NOS      Level of Care   I.) Intoxication and Withdrawal: 0   II.) Biomedical:  1   III.) Emotional and Behavioral:  2   IV.) Readiness to Change:  3   V.) Relapse Potential: 3   VI.) Recovery Environmental: 3       Initial Problem List     The patient lacks relapse prevention skills  The patient has poor coping skills  The patient has poor refusal skills   The patient lacks a sober peer support network  The patient has a tendency to isolate  The patient has a history of having dual issues of MI and CD  The patient has a significant history of trauma and/or abuse issues  The patient has a significant history of grief and loss issues    Patient/Client is willing to " follow treatment recommendations.  Yes    Counselor: Ga Duong [facility]      Vulnerable Adult Checklist for OUTPATIENTS     1.  Do you have a physical, emotional or mental infirmity or dysfunction?       No    2.  Does this issue impair your ability to provide for your own care without help, including providing yourself with food, shelter, clothing, healthcare or supervision?       No    3.  Because of this issue, I need assistance to protect myself from maltreatment by others.      No    Based on the above information:    This person is not a functional Vulnerable Adult according to [place] Statute 626.5572 subdivision 21.

## 2017-11-21 ASSESSMENT — ANXIETY QUESTIONNAIRES: GAD7 TOTAL SCORE: 1

## 2018-01-24 ENCOUNTER — HOSPITAL ENCOUNTER (OUTPATIENT)
Dept: BEHAVIORAL HEALTH | Facility: CLINIC | Age: 53
Discharge: HOME | End: 2018-01-24
Attending: SOCIAL WORKER | Admitting: SOCIAL WORKER
Payer: COMMERCIAL

## 2018-01-24 VITALS
HEIGHT: 70 IN | DIASTOLIC BLOOD PRESSURE: 85 MMHG | BODY MASS INDEX: 29.35 KG/M2 | SYSTOLIC BLOOD PRESSURE: 146 MMHG | HEART RATE: 73 BPM | WEIGHT: 205 LBS

## 2018-01-24 PROCEDURE — H0001 ALCOHOL AND/OR DRUG ASSESS: HCPCS

## 2018-01-24 ASSESSMENT — ANXIETY QUESTIONNAIRES
6. BECOMING EASILY ANNOYED OR IRRITABLE: SEVERAL DAYS
2. NOT BEING ABLE TO STOP OR CONTROL WORRYING: NOT AT ALL
4. TROUBLE RELAXING: SEVERAL DAYS
1. FEELING NERVOUS, ANXIOUS, OR ON EDGE: NOT AT ALL
5. BEING SO RESTLESS THAT IT IS HARD TO SIT STILL: NOT AT ALL
7. FEELING AFRAID AS IF SOMETHING AWFUL MIGHT HAPPEN: NOT AT ALL
3. WORRYING TOO MUCH ABOUT DIFFERENT THINGS: SEVERAL DAYS
GAD7 TOTAL SCORE: 3

## 2018-01-24 ASSESSMENT — PATIENT HEALTH QUESTIONNAIRE - PHQ9: SUM OF ALL RESPONSES TO PHQ QUESTIONS 1-9: 7

## 2018-01-24 ASSESSMENT — PAIN SCALES - GENERAL: PAINLEVEL: SEVERE PAIN (6)

## 2018-01-24 NOTE — PROGRESS NOTES
COMPREHENSIVE ASSESSMENT  Original Comprehensive Assessment    Background Information   Original Date of Assessment:  1/24/2018 Referral Source:  Self   Evaluation Counselor:  LAKEISHA Cadet Counselor Telephone #:   [phone] Assessment Site:  [facility]   Patient Name:   Mark Berumen YOB: 1965 Age:  52 year old Gender:  male Medical Record #:  [id number]   Patient's Primary Language:  English Do you need assistance with reading, writing or hearing?  Do you need a ?  No   Current Address:  [facility] [address] [facility] [address] [place] [address]   Patient Phone Number:  [phone] (home)    Patient Mobile Number:    No relevant phone numbers on file.      Patient E-mail Address:  [email or web address]     Which pronouns do you prefer to be referred by?  He     With which race do you identify?  White     This patient was seen for a face to face assessment on 1/24/2018:  Yes       Crisis Intervention Questions     1. Are you currently having severe withdrawal symptoms that are putting yourself or others in danger?  No    2. Are you currently having severe medical problems that require immediate attention?  No    3. Are you currently having severe emotional or behavioral problems that are putting yourself or others at risk of harm?  No    Precipitating Event Summary     What are the circumstances or events that have led up to you participating in this evaluation today?    The patient came to [facility] for evaluation of possible chemical dependency.  The reason for the CD evaluation was due to his own awareness that he needed help and due to pressure from his girlfriend of the past 7 years for him to get help.  He reported he had been  from his girlfriend and her 6 year old triplets since she had moved out of the family home in 11/2017.  The patient had a prior CD evaluation with this counselor on 11/20/2017 and it was  recommended for him to enter a residential CD treatment program following that CD evaluation.  A referral had been made for the residential CD treatment program at [facility] in [place], [place], but he had failed to follow through with that recommendation at that time.  The patient had been continuing to abuse alcohol and other non-prescribed mood altering chemicals since 11/2017 and he reported scheduling the CD evaluation today on 1/24/2018 because he wanted to follow through on entering the residential CD treatment program at [facility] in [place], [place] at this time.  The patient reported that he is not currently prescribed any medications and his chronic pain issues continue to be a big trigger for him to abuse mood altering chemicals.         Have you participated in prior substance use disorder evaluations?     Yes. When, Where, and What circumstances: 11/2017    Comprehensive Substance Use History    X = Primary Drug Used Age of First Use    Pattern of Substance Use   Make sure to include period of heaviest use in life and a use history within the past year if applicable.  Please include a pattern with a specific range of amounts used and a frequency of use:  (DSM-5: Sx #3) Date of last use  Quantity of last use if within the past 30 days Withdrawal Potential?  Screen for need of IP detox or other medical intervention Method of use  (Oral, smoked, snorted, IV, etc)    Alcohol       15 HU: [age], when he reported a pattern of drinking a few beers and 1 pint of Tequila on a daily basis.     From 7/2017 until 9/2017, he reported a pattern of drinking 6 beers and occasionally 1 pint of Tequila on a daily basis.     Since 9/2017, he reported a pattern of drinking a few beers or a pint of Tequila around 1 time per week on average.     From 11/20/2017 until late 11/2017, he reported a pattern of drinking 4-6 beers and several shots of Tequila on a daily basis.     Since 12/2017, he reported a pattern of drinking  anywhere from 3-4 shots of Tequila and 3-4 beers up to 6 beers and 1 pint of Tequila 3-4 times per week.     1/18/2018    (1 pint and 5-6 beers) None Oral    Marijuana/  Hashish     15 HU: 16-26, when he reported a pattern of smoking a few bowls of THC on a daily basis.     From 11/2016 until 11/2017, he reported using THC on around 50 occasions and a portion of those 50 occasions were when he had consumed THC in brownies or other edibles in an attempt to manage his chronic pain issues.     Since 11/2017, he reported a pattern of smoking 1-2 hits of THC around 1 time per week on average.   1-2 weeks ago    (few hits) None Smoke    Cocaine/Crack       19 HU: 19-26, when he reported a pattern of snorting 1/2 gram of powder cocaine on an almost daily basis.     He reported having no use of cocaine since age 34.   34 None Snort    Meth/  Amphetamines       22        45 He reported snorting methamphetamine on 12 occassions in his life.     HU: 9/2017 until 11/2017, when he reported a pattern of snorting 4 tablets of his prescribed Adderall supplemented with Adderall he was obtaining elsewhere on a daily basis.     Prior to 11/2017, he reported taking his prescribed Adderall mostly as prescribed with occasional overuse or abuse of his prescribed Adderall between 5-10 times per month on average.     Since 11/2017, he reported a pattern of using 1-2 tablets of non-prescribed Adderall around 10 times per month as he is able to get them as he is no longer prescribed Adderall.   24        1/24/2018    (one 20 mg tablet) None        None Snort        Oral and snort    Heroin       N/A        Other Opiates/  Synthetics     46 HU: For a 6-week period of time at age 46, when he reported a pattern of using up to 30 tablets of Oxycodone on a daily basis.     Prior 8/2017, he reported a pattern of using 4 tablets of his prescribed Percocet on a daily basis.     He reported overusing up to 6-7 tablets of prescribed Percocet  supplemented with non-prescribed opiate pain medication he was getting off of the street a few times per week on average.     He was taken off of his prescribed Percocet in 8/2017 due to having positive UA's for non-prescribed mood altering chemicals.     Since 8/2017, he had been prescribed 8 mg of Suboxone and sometimes he would take additional Buprenorphine he was getting from a friend to supplement his use.     He also reported stopping his use of his prescribed Suboxone for a few days at a time when he would take non-prescribed opiate pain medications up to a few times per month, when he reported taking between 3-4 tablets of non-prescribed Oxycodone per day.     The patient's prescribed Suboxone ran out in 10/2017, but he had taking non-prescribed Buprenorphine on 4-5 occassions in 11/2017.     From 11/20/2017 until late 11/2017, he reported a pattern of using between 1-2 (10 mg) tablets of non-prescribed Oxycodone on a daily basis.     Since 12/2017, he reported a pattern of taking 1-2 tablets of non-prescribed Oxycodone around 15 times per month as he is able to get them since he is no longer prescribed an opiate pain medications.   1/23/2018    (10 mg of Oxycodone) None Oral    Inhalants      N/A        Benzodiazepines       N/A        Hallucinogens       18 LSD: 12 times in life.  Mushrooms: 3-4 times in life.   23 None Oral    Barbiturates/  Sedatives/  Hypnotics   N/A        Over-the-Counter Drugs     N/A        Other       21 MDMA: 100 times in life between the ages of 21 and 24.   24 None Oral    Nicotine       17 He reported a current pattern of smoking 1 pack of cigarettes on a daily basis.   1/24/2018 None Smoke     DIMENSION I - Acute Intoxication / Withdrawal Potential     1. Do you use greater amounts of alcohol/other drugs to feel intoxicated, use greater amounts to achieve the desired effect, or use the same amount and get less of an effect?  (DSM-5: Sx #10)     Yes, explain: Increased amounts  of alcohol and other drugs.     2. Have you ever had an inpatient detoxification admission?  (DSM-5: Sx #11)    Yes, a.)  How many detoxification admissions in your life? 6                                                        b.) What was the date of your most recent detoxification admission? 5 years ago    3. Withdrawal Symptoms:  Within the past year: Within the past 30 days:   Sweating (Rapid Pulse)  Shaky / Jittery / Tremors  Headache  Fatigue / Extremely Tired  Sad / Depressed Feeling  Muscle Aches  Irritability  High Blood Pressure  Nausea / Vomiting  Diarrhea  Diminished Appetite  Unable to Eat   None       4. Is the patient currently exhibiting symptoms of withdrawal?  (DSM-5: Sx #11)    No    5. Based on the above information, does treatment for withdrawal symptoms appear to be a need at this time?  (DSM-5: Sx #11)    No    Dimension I Ratings Summary   Acute intoxication/Withdrawal potential - The placing authority must use the criteria in Dimension I to determine a client s acute intoxication and withdrawal potential.    RISK DESCRIPTIONS - Severity ratin Client displays full functioning with good ability to tolerate and cope with withdrawal discomfort. No signs or symptoms of intoxication or withdrawal or resolving signs or symptoms.    REASONS SEVERITY WAS ASSIGNED (What about the amount of the person s use and date of most recent use and history of withdrawal problems suggests the potential of withdrawal symptoms requiring professional assistance?)     The patient does not appear to have any risk of having significant withdrawal symptoms at this time.         DIMENSION II - Biomedical Complications and Conditions     1. Do you have any current health/medical conditions?(Include any infectious diseases, allergies, chronic or acute pain, history of chronic conditions)       Yes.   Illnesses/Medical Conditions you are receiving care for:   Past Medical History:   Diagnosis Date     ADD (attention  deficit disorder)      Arthritis      Chronic pain      Substance abuse     Etoh     Uncomplicated asthma      2. Do you have a health care provider? When was your most recent appointment? What concerns were identified?     Dr. Schultz    3. If yes indicated by answers to items 1 or 2: How do you deal with these concerns? Is that working for you? If you are not receiving care for this problem, why not?      No treatment for any of the above health issues at this time.    4. Please list all of the patient's current medication(s) including health management, psychotropic, pain management, over-the-counter and/or herbal supplements:     None at this time.    5. When did you last take your medication?     10/2017    6. Do you currently self-administer your medications?      N/A    7. Do you follow current medical recommendations/take medications as prescribed?     NA    8. Has a health care provider/healer ever recommended that you reduce or quit alcohol/drug use?  (DSM-5: Sx #9)    Yes    9. Are you pregnant?     NA, Male    10. Have you had any injuries, assaults/violence towards you, accidents, health related issues, overdose(s) or hospitalizations related to your use of alcohol or other drugs:  (DSM-5: Sx #8 & #9)    Yes, explain: He has had several IP detoxification admissions.    11. Have you engaged in any risk-taking behavior that would put you at risk for exposure to blood-borne or sexually transmitted diseases?    No    12. Are you on a special diet?    No    13. Do you have any concerns regarding your nutritional status?    No    14. Have you had any appetite changes in the last 3 months?    No    15. Have you had weight loss or weight gain of more than 10 lbs in the last 3 months?   If patient gained or lost more than 10 lbs, then refer to program RN / attending Physician for assessment.    No    16. Was the patient informed of BMI?  Yes    Above,  General nutrition education    17. Do you have any dental  problems?    Yes, Patient referred to go to their dentist.     18. Do you have any specific physical needs or disabilities that would need accomodation in a treatment program?     No    Dimension II Ratings Summary   Biomedical Conditions and Complications - The placing authority must use the criteria in Dimension II to determine a client s biomedical conditions and complications.   RISK DESCRIPTIONS - Severity ratin Client has difficulty tolerating and coping with physical problems or has other biomedical problems that interfere with recovery and treatment. Client neglects or does not seek care for serious biomedical problems.    REASONS SEVERITY WAS ASSIGNED (What physical/medical problems does this person have that would inhibit his or her ability to participate in treatment? What issues does he or she have that require assistance to address?)    The patient reported having a history of the above issues and he denied having any other history of chronic medical problems.  The patient denied taking any other prescription or OTC medications at this time, but he had been abusing non-prescribed mood altering chemicals.  The patient reported his chronic pain issues are his biggest trigger for him to abuse mood altering chemicals.  The patient would benefit from following all of the recommendations of his medical providers.          DIMENSION III - Emotional, Behavioral, Cognitive Conditions and Complications     Childhood Environmental Background     1. What was it like growing up in your family?    He was the youngest of 4 and he felt support by his family members.    [place]. [place] 15 [place] [facility]    3. Who were you primarily raised by?  Both Parents    4. Family History   Mother   Living Father      No Step-mother   NA No Step-father   NA   Maternal Grandmother    Paternal Grandmother    Maternal Grandfather     Paternal    Grandfather    1 Sister(s)   Living 2 Brother(s)   1 Living and 1    No Half-sister(s)   NA No Half-brother(s)   NA     5. Have any family members or significant others had problems with substance abuse or mental health issues?    Family History   Problem Relation Age of Onset     Depression Mother      Substance Abuse Father      Substance Abuse Paternal Grandfather      Substance Abuse Sister      Anxiety Disorder Son      Substance Abuse Brother      Depression Brother      Anxiety Disorder Brother      Substance Abuse Son      Anxiety Disorder Son      Depression Son      6. How were you disciplined as a child?    Spanked in an appropriate manner.    7. Did you feel supported when you were a child?    Yes    8. Who were the people who gave you support as a child?    His parents and his other family members    9. Is there any history of sexual abuse in your family?    No    GAIN Short Screener     9. When was the last time that you had significant problems...  A. with feeling very trapped, lonely, sad, blue, depressed or hopeless  about the future? Past Month    B. with sleep trouble, such as bad dreams, sleeping restlessly, or falling  asleep during the day? Past Month    C. with feeling very anxious, nervous, tense, scared, panicked, or like  something bad was going to happen? Past Month    D. with becoming very distressed and upset when something reminded  you of the past? Past Month    E. with thinking about ending your life or committing suicide? Never    10. When was the last time that you did the following things two or more times?  A. Lied or conned to get things you wanted or to avoid having to do  something? Past Month    B. Had a hard time paying attention at school, work, or home? Past Month    C. Had a hard time listening to instructions at school, work, or home? Past Month    D. Were a bully or threatened other people? Never    E. Started physical fights with other people? 1+ years  ago    Note: These questions are from the Global Appraisal of Individual Needs--Short Screener. Any item marked  past month  or  2 to 12 months ago  will be scored with a severity rating of at least 2.     For each item that has occurred in the past month or past year ask follow up questions to determine how often the person has felt this way or has the behavior occurred? How recently? How has it affected their daily living? And, whether they were using or in withdrawal at the time?    11. If the person has answered item 9E with  in the past year  or  the past month , ask about frequency and history of suicide in the family or someone close and whether they were under the influence.     The patient denied having any history of SIB, suicide attempts or suicide ideation.    12. Has anyone close to you, a family member, a friend or a significant other attempted or completed a suicide?     Yes, explain: His best friend killing himself when he was just 20 years old.    13. If the person answered item 9E  in the past month  ask about intent, plan, means and access and any other follow-up information to determine imminent risk. Document any actions taken to intervene on any identified imminent risk.      The patient denied having any history of SIB, suicide attempts or suicide ideation.    PHQ-9, MAHENDRA-7 and Suicide Risk Assessment   PHQ-9 on 1/24/2018 MAHENDRA-7 on 1/24/2018   The patient's PHQ-9 score was 7 out of 27, indicating mild depression.     The patient's MAHENDRA-7 score was 3 out of 21, indicating minimal anxiety.         Suicide Screening Questions:   1. Have you wished you were dead or wished you could go to sleep and not wake up?     No   2. Have you had actual thoughts of killing yourself?     No   3. When did you have these thoughts?     NA   4. Do you have any current intent or active desire to take your life?     No   5. Do you have a plan to take your life?     No   6. Have you ever made a suicide attempt?     No  "  7. Do you have access to pills, guns or other methods to kill yourself?     No     Guide to Risk Ratings   IDEATION: Active thoughts of suicide? INTENT: Intent to follow on suicide? PLAN: Plan to follow through on suicide? Level of Risk:   IF Yes Yes Yes Patient = High Emergent   IF Yes Yes No Patient = High Urgent/Non-Emergent   IF Yes No No Patient = Moderate Non-Urgent   IF No No   No Patient = Low Risk   The patient's ADDITIONAL RISK FACTORS and lack of PROTECTIVE FACTORS may increase their overall suicide risk ratings.     Patient's Responses (within the last 30 days)   IDEATION: Active thoughts of suicide?    No     INTENT: Intent to follow on suicide?    No     PLAN: Plan to follow through on suicide?    No     Determining the level of risk depends on the patient responses, suicide risk factors and protective factors.     Additional Risk Factors: Someone close to the patient (family member/friend) completed a suicide  Significant history of having untreated or poorly treated mental health symptoms  Significant history of untreated or poorly treated chronic pain issues  Tendency to be socially isolated and/or cut off from the support of others  A recent death of someone close to the patient and/or unresolved grief and loss issues  A recent loss that was significant to the patient, i.e. loss of job, loss of home, divorce, break-up, etc.  History of impulsive or aggressive behaviors   Protective Factors:  Having people in his/her life that would prevent the patient from considering committing suicide (i.e. young children, spouse, parents, etc.)  Having easy access to supportive family members  Having cultural, Buddhist or spiritual beliefs that discourage suicide     Risk Status   Emergent? No   Urgent / Non-Emergent? No   Present / Non- Urgent? Yes, Document in Epic / SBAR to counselor, Collaborate with patient / client to develop \"Patient Safety Plan\", Referral to PCP or psychiatrist, Address in Treatment " Plan, Continuous monitoring, assessment and intervention and Address in Discharge / Transition Plan    Low Risk? See above   Additional information to support suicide risk rating: NA     Mental Health History and Mental Health Screening Questions     14. Have you ever been diagnosed with a mental health problem?     Yes, If yes explain: The patient reported having a history of Depression NOS in the remote past and ADD, but he denied having any recent formal mental health diagnosis and he last took medications for Depression in 2012.     15. Have you ever been prescribed medications for mental health issues?    Yes, If yes explain: The patient reported having a history of Depression NOS in the remote past and ADD, but he denied having any recent formal mental health diagnosis and he last took medications for Depression in 2012.     16. Have you ever worked with a mental health therapist?    Yes, If yes explain: The patient reported he had been working with a 1:1 mental health therapist on a weekly basis last year, but he is not currently working with a 1:1 mental health therapist.     17. Do your current mental health providers know about your substance use history and/or about your current substance use?    NA    18. Have you ever had an inpatient mental health hospital admission?    No    19. Have you ever hurt yourself, such as cutting, burning or hitting yourself? No    20. Have you ever been verbally, emotionally, physically or sexually abused?      No    21. Have you lived through any traumatic or stressful life events, such as the death of someone close to you, witnessing violence, being a victim of crime, going through a bad break-up, or any other life event that had caused you significant distress?    Yes, If yes explain: The patient reported having a history of trauma issues related to his best friend killing himself when he was just 20 years old, related to his brother passing away suddenly and  unexpectedly from an accidental drug overdose in 9/2017, and related to being severely injured 3 years ago when he was working on a car and it fell on him.  The patient also reported having some grief and loss issues the recent separation from his girlfriend of the past 7 years in early 11/2017 and due to being away from the 6 year old triplets he had been helping his girlfriend raise since they were born.  The patient reported having significant grief and loss issues regarding his brother's death.     22. If applicable, have you had any of the following symptoms related to the trauma, abuse or other stressful life events? (dreams, intense memories, flashbacks, physical reactions, etc.)     No    23. If applicable, have you received counseling for trauma or abuse issues?      Yes, If yes explain: The patient reported he had been working with a 1:1 mental health therapist on a weekly basis last year, but he is not currently working with a 1:1 mental health therapist.     24. Have you ever touched or fondled someone else inappropriately or forced them to have sex with you against their will?    No    25. Have you ever felt obsessed by your sexual behavior, such as having sex with many partners, masturbating often, using pornography often? No    26. Have you ever purged, binged or restricted yourself as a way to control your weight? No    27. Have you ever believed people were spying on you, or that someone was plotting against you or trying to hurt you? No    28. Have you ever believed someone was reading your mind or could hear your thoughts or that you could actually read someone's mind or hear what another person was thinking? No    29. Have you ever believed that someone or some force outside of yourself was putting thoughts into your mind or made you act in a way that was not your usual self?  Have you ever thought you were possessed? No    30. Have you ever believed you were being sent special messages through  the TV, radio, newspaper or internet?  No    31. Have you ever heard things other people couldn't hear, such as voices or other noises? No    32. Have you ever had visions when you were awake?  Or have you ever seen things other people couldn't see? No    33. Have you ever had to lie to people important to you about how much you webster? Yes, If yes explain: In the remote past over 10 years ago.    34. Have you ever felt the need to bet more and more money? Yes, If yes explain: In the remote past over 10 years ago.    35. Have you ever attempted treatment for a gambling problem? No    36. Highest grade of school completed:  Some college, but no degree    37. Do you have any difficulties with reading, writing or calculating?  No    38. Have you ever been diagnosed with a learning disability, such as ADHD or dyslexia?  Yes, If yes explain: History of ADD    39. What is your preferred learning style?  by hands-on practice    40. Do you have any problems with memory impairment or problem solving?  No    41. Do you have any problems with headaches or dizziness? No    42. Have you ever been in the ?  No    43. Have you been diagnosed with traumatic brain injury or Alzheimer s?  No    44. Have you ever hit your head or been hit on the head?  Yes    45. Have you ever had medical treatment for an injury to your head?  Yes    46. Have you had any significant illness that affected your brain (brain tumor, meningitis, West Nile Virus, stroke, seizure, heart attack, near drowning or near suffocation)?  No    47. Have you ever been diagnosed with Fetal Alcohol Effects or Fetal Alcohol Syndrome?  No    48. What are your some of your personal strengths?  Easy to get along with, caring and a good problem solver.    Dimension III Ratings Summary   Emotional/Behavioral/Cognitive - The placing authority must use the criteria in Dimension III to determine a client s emotional, behavioral, and cognitive conditions and  complications.   RISK DESCRIPTIONS - Severity ratin Client has difficulty with impulse control and lacks coping skills. Client has thoughts of suicide or harm to others without means; however, the thoughts may interfere with participation in some treatment activities. Client has difficulty functioning in significant life areas. Client has moderate symptoms of emotional, behavioral, or cognitive problems. Client is able to participate in most treatment activities.    REASONS SEVERITY WAS ASSIGNED - What current issues might with thinking, feelings or behavior pose barriers to participation in a treatment program? What coping skills or other assets does the person have to offset those issues? Are these problems that can be initially accommodated by a treatment provider? If not, what specialized skills or attributes must a provider have?    The patient reported having a history of Depression NOS in the remote past and ADD, but he denied having any recent formal mental health diagnosis and he last took medications for Depression in .  The patient reported he had been working with a 1:1 mental health therapist last year, but he is not currently working with a 1:1 mental health therapist.  The patient has difficulty with impulse control and he lacks sober coping skills.  The patient reported having a history of trauma issues related to his best friend killing himself when he was just 20 years old, related to his brother passing away suddenly and unexpectedly from an accidental drug overdose in 2017, and related to being severely injured 3 years ago when he was working on a car and it fell on him.  The patient also reported having some grief and loss issues the recent separation from his girlfriend of the past 7 years in early 2017 and due to being away from the 6 year old triplets he had been helping his girlfriend raise since they were born.  The patient reported having significant grief and loss issues  regarding his brother's death.  The patient denied having any history of SIB, suicide attempts or suicide ideation.  The patient would benefit from having a mental health evaluation to rule out current clinical mental health issues and from following all of the recommendations of his mental health providers.       DIMENSION IV - Readiness to Change     1. What is your motivation for participating in this evaluation today?    His own desire to stop his substance abuse as well as ongoing pressure from his girlfriend for him to get help.    2. What problematic behaviors have you engaged in when using alcohol or other drugs that could be hazardous to you or others (i.e. driving a car/motorcycle/boat, operating machinery, unsafe sex, IV drug use, sharing needles, etc.)  (DSM-5: Sx #8)    The patient reported having a history of driving while under the influnece of alcohol or drugs.    3. If applicable, when did you first think you had a problem with your alcohol or other drug use?    Many years ago.    4. Who in your life has shared concerns with you about your use of alcohol or other drugs?    His girlfriend and his family members had shared concern with him about his substance abuse.    5. Are there any changes you have made or plan to make regarding how you had been using alcohol or other drugs?    His plan and goal is to abstain from non-prescribed all mood altering chemicals.     Dimension IV Ratings Summary   Readiness for Change - The placing authority must use the criteria in Dimension IV to determine a client s readiness for change.   RISK DESCRIPTIONS - Severity rating: 3 Client displays inconsistent compliance, minimal awareness of either the client s addiction or mental disorder, and is minimally cooperative.    REASONS SEVERITY WAS ASSIGNED - (What information did the person provide that supports your assessment of his or her readiness to change? How aware is the person of problems caused by continued use?  How willing is she or he to make changes? What does the person feel would be helpful? What has the person been able to do without help?)      The patient displayed verbal compliance to abstain from all mood altering chemicals, but he had lacked consistent behavior to support abstinence, including having poor follow through with prior CD treatment recommendations including failing to enter CD treatment at any level of care following his 11/20/2017 CD evaluation.   The patient did appear to be willing to enter the the residential CD treatment program at [facility] in [place], [place] for primary chemical dependency treatment.       DIMENSION V - Relapse, Continued Use and Continued Problem Potential     1. If you have had previous periods of sobriety, when was your longest period of sobriety and what were you doing at that time that was supporting your sobriety?  (DSM-5: Sx #2)    He reported having around 8 years from age 34 to age 42 when he was attending 12-step support group meetings and was very active in his recovery.  He reported living with an alcoholic 8 years ago had been the trigger for relapse at that time and he reported self-medicating his pain issues had been his relapse triggers within the past few years.    2. Within the past 30 days, on a scale from 0-10 (0 = having no cravings at all and 10 = having very strong cravings to use alcohol or other drugs) what number would you assign to your cravings? (DSM-5: Sx #4)     7    3. Can you identify any specific reasons or specific triggers that contribute to you being more likely to consume alcohol or other drugs? (DSM-5: Sx #4)    Yes, explain: His chronic pain issues are a biggest trigger to abuse mood altering chemicals.    4. Have you been treated for alcohol/other substance use disorder? (DSM-5: Sx #2)    Yes  4B. Number of times(lifetime) (over what period) 5.   4C. Number of times completed treatment (lifetime) 4.   4D. During the past three years have  you participated in outpatient and/or residential?  Yes  4E. When and where? [facility] 2 years ago, but he didn't complete that treatment program..  4F. What was helpful? What was not? The group therapy had been very helpful for him.    5. Support group participation: Have you/do you attend 12-step or other support group meetings? How recently? What was your experience? Are you willing to restart? If the person has not participated, is he or she willing?  (DSM-5: Sx #2)    The patient denied having any recent attendance at 12-step or other support group meetings.    6. Do you drink alcohol or use other drugs in larger amounts than intended or over a longer period of time than was intended?  (DSM-5: Sx #1)    Yes, explain: He reported using larger amounts of alcohol and drugs than intended.    7. Do you spend a great deal of time engaged in activities necessary to obtain alcohol or other drugs, a great deal of time using alcohol or other drugs, or a great deal of time recovering from alcohol or other drug use?  (DSM-5: Sx #3)    Yes, explain: He reported abusing mood altering chemicals several times per week on average.    Dimension V Ratings Summary   Relapse/Continued Use/Continued problem potential - The placing authority must use the criteria in Dimension V to determine a client s relapse, continued use, and continued problem potential.   RISK DESCRIPTIONS - Severity ratin No awareness of the negative impact of mental health problems or substance abuse. No coping skills to arrest mental health or addiction illnesses, or prevent relapse.    REASONS SEVERITY WAS ASSIGNED - (What information did the person provide that indicates his or her understanding of relapse issues? What about the person s experience indicates how prone he or she is to relapse? What coping skills does the person have that decrease relapse potential?)      The patient had continued to abuse mood altering chemicals despite having  negative consequences in many life areas, including having medical and mental health issues that are exacerbated by his substance abuse.  The patient lacks sober coping skills and he lacks long-term sober maintenance skills.  The patient has multiple issues of MI, CD and chronic pain.  The patient lacks a current sober peer support network.       DIMENSION VI - Recovery Environment     1. Are you employed or attending school?    The patient reported working full-time as a  for the past 26 years.  He reported his work environment is stable and his job is not at risk at this time.    2. If working or a student, are you able to function appropriately in that setting?     Yes    3. Has your job and/or school work been negatively impacted by your use of alcohol of other drugs?  (DSM-5: Sx #5 & Sx #7)    Yes, If yes explain: a little bit by missing a few days or work.    4. How would you describe your current finances?  Just making it     5. Are you having financial problems, such as money being tight, living paycheck to paycheck, having unpaid or late bills, having significant debt, a history of bankruptcy, or IRS problems?    Yes, please explain: The patient reported having some increased financial stress due to money being tight and due to having some issues with the IRS because he had not filed his taxes for the past few years.     6. Describe a typical day; evening for you. Work, school, social, leisure activities, volunteer, exercise, spiritual practices or other daily tasks.    Go to work, spend time with his girlfriend and her 6 year old triplets when he is able and drinking alcohol and abusing other mood altering chemicals.    7. Have you reduced or discontinued recreational activities, hobbies or other leisure activities as a result of your use of alcohol or other drugs?  (DSM-5: Sx #7)    Yes, If yes explain: his main focus is on work, but his use has had a negative impact on spending time with his  family.    8. Who do you live with?      The patient is currently living alone in single family home.  He reported his girlfriend of the past 7 years had been living there with him with her 6 year old triplets until moving out of the home to a town house in early 11/2017.      9. Are there any people in the home who have current substance abuse issues or have mental health issues?     NA    10. Tell me about your living environment/neighborhood? Ask enough follow up questions to determine safety, criminal activity, availability of alcohol and drugs, supportive or antagonistic to the person making changes.      The patient denied having any concerns regarding his immediate living environment and/or neighborhood, but he had been unable to maintain abstinence from non-prescribed mood altering chemicals while living there.     11. Are you concerned for your safety or anyone else's safety in the home? No    12. Do you have plans to move somewhere else or change your living environment in any manner?    The patient is planning to move into a smaller place, but to stay in the same area.    13. Do you have children who live with you?     No    14. Do you have children who do not live with you?     Yes.  (Ask follow up questions to learn where the children are, who has custody and what the person s relationship and responsibility is with these children and what hopes the person has for his or her future with these children.)     The patient has 2 adult sons ages 26 and 22 who live on their own.  The patient also reported helping raise his girlfriend's 6 year old triplets since they were born.     15. Do you have any history of being involved with Child Protection Services? No     16. Are you currently in a significant relationship?     Yes, if yes:  How long have you been in the relationship?  He is currently  from his girlfriend of the past 7 years.    17. How do you identify your sexual  orientation?    Heterosexual    18. The patient reported: .    19. Does your significant other have a history of substance abuse or have current substance abuse issues?    Yes, If yes explain: He reported his girlfriend has a history of substance abuse and she had a slip with drinking alcohol during the summer of 2017, but she is not currently abusing any mood altering chemicals.    20. How important is substance use to your social connections? Do many of your family or friends use?     Not important to any social connections, if anything his substance abuse caused him to be more socially isolated.    21. Who in your life would you consider to be your primary support network at this time?    His girlfriend of 7 years and his sister.    22. Have any of your relationships (S.O., family members, friends, employers, teachers, etc.) been negatively impacted by your use of alcohol or other drugs?  (DSM-5: Sx #6)    Yes, If yes explain: He is currently  from his girlfriend of the past 7 years due to his substance abuse issues.    23. Do you currently participate in community ana lilia activities, such as attending Sabianism, temple, Cheondoism or Baptism services?  No    24. Criminal justice history: Gather current/recent history and any significant history related to substance use--Arrests? Convictions? Circumstances? Alcohol or drug involvement? Sentences? Still on probation or parole? Expectations of the court? Current court order?  (DSM-5: Sx #8)    The patient reported having a history of 3 DWI charges (the last DWI was at age 47) and 1 drug possession charge around 5 years ago.  He denied having any current or pending legal issues and he denied being on court probation at this time.     25. Are you or have you ever been a registered sex offender? No    26. Do you have a child protection worker,  or ? No    27. Do you have a valid 's license?  No, please explain: Due to  unpaid fines.    28. What obstacles exist to participating in treatment? (Time off work, childcare, funding, transportation, pending FDC time, living situation)     None    Dimension VI Ratings Summary   Recovery environment - The placing authority must use the criteria in Dimension VI to determine a client s recovery environment.   RISK DESCRIPTIONS - Severity rating: 3 Client is not engaged in structured, meaningful activity and the client s peers, family, significant other, and living environment are unsupportive, or there is significant criminal justice system involvement.    REASONS SEVERITY WAS ASSIGNED - (What support does the person have for making changes? What structure/stability does the person have in his or her daily life that will increase the likelihood that changes can be sustained? What problems exist in the person s environment that will jeopardize getting/staying clean and sober?)     The patient is currently living alone in single family home.  He reported his girlfriend of the past 7 years had been living there with him with her 6 year old triplets until moving out of the home to a town house in early 11/2017.  He reported his girlfriend has a history of substance abuse and she had a slip with drinking alcohol during the summer of 2017, but she is not currently abusing any mood altering chemicals.  The patient reported having significant relationship conflict with his girlfriend due to his substance abuse issues and they are currently  in large part due to his ongoing substance abuse issues.  The patient reported that his girlfriend would be very supportive of him abstaining from all non-prescribed mood altering chemicals.  The patient denied having any concerns regarding his immediate living environment and/or neighborhood, but he had been unable to maintain abstinence from non-prescribed mood altering chemicals while living there.  The patient has 2 adult sons ages 26 and 22 who live  on their own.  The patient also reported helping raise his girlfriend's 6 year old triplets since they were born.  The patient denied having any recent attendance at 12-step support groups and he lacks a current sober peer support network.  The patient reported having a history of 3 DWI charges (the last DWI was at age 47) and 1 drug possession charge around 5 years ago.  He denied having any current or pending legal issues and he denied being on court probation at this time.  The patient reported most of his abuse of opiate pain medications and other mood altering chemicals had been done alone with attempts to hide his substance abuse from others.  The patient reported working full-time as a  for the past 26 years.  He reported his work environment is stable and his job is not at risk at this time.  The patient reported having some increased financial stress due to money being tight and due to having some issues with the [employer] because he had not filed his taxes for the past few years.       Mental Health Status   Physical Appearance/Attire: Appears stated age   Hygiene: well groomed   Eye Contact: at examiner   Speech Rate:  regular   Speech Volume: regular   Speech Quality: fluid   Cognitive/Perceptual:  reality based   Cognition: memory intact    Judgment: intact   Insight: intact   Orientation:  time, place, person and situation   Thought:   logical    Hallucinations:  none   General Behavioral Tone: cooperative   Psychomotor Activity: no problem noted   Gait:  no problem   Mood: appropriate   Affect: congruence/appropriate   Counselor Notes: NA     Patient Choices/Exceptions     Would you like services specific to language, age, gender, culture, Taoist preference, race, ethnicity, sexual orientation or disability?  No    What particular treatment choices and options would you like to have? The residential CD treatment program at [facility] in [place], [place].    Do you have a preference for a  particular treatment program? The residential CD treatment program at [facility] in [place], [place].    Patient is willing to follow treatment recommendations.  Yes    DSM-5 Criteria for Substance Use Disorder   Criteria for Diagnosis  Instructions: Determine whether the client currently meets the criteria for Substance Use Disorder using the diagnostic criteria in the DSM-5 pp.[id number]. Current means during the most recent 12 months outside a facility that controls access to substances.    A problematic pattern of alcohol/drug use leading to clinically significant impairment or distress, as manifested by at least two of the following, occurring within a 12-month period:    1. Alcohol/drug is often taken in larger amounts or over a longer period than was intended.  2. There is a persistent desire or unsuccessful efforts to cut down or control alcohol/drug use  3. A great deal of time is spent in activities necessary to obtain alcohol/drug, use alcohol/drug, or recover from its effects.  4. Craving, or a strong desire or urge to use alcohol/drug  5. Recurrent alcohol/drug use resulting in a failure to fulfill major role obligations at work, school or home.  6. Continued alcohol use despite having persistent or recurrent social or interpersonal problems caused or exacerbated by the effects of alcohol/drug.  7. Important social, occupational, or recreational activities are given up or reduced because of alcohol/drug use.  8. Recurrent alcohol/drug use in situations in which it is physically hazardous.  9. Alcohol/drug use is continued despite knowledge of having a persistent or recurrent physical or psychological problem that is likely to have been caused or exacerbated by alcohol.  10. Tolerance, as defined by either of the following: A need for markedly increased amounts of alcohol/drug to achieve intoxication or desired effect.  11. Withdrawal, as manifested by either of the following: The characteristic withdrawal  syndrome for alcohol/drug (refer to Criteria A and B of the criteria set for alcohol/drug withdrawal).          Specify if: In early remission:  After full criteria for alcohol/drug use disorder were previously met, none of the criteria for alcohol/drug use disorder have been met for at least 3 months but for less than 12 months (with the exception that Criterion A4,  Craving or a strong desire or urge to use alcohol/drug  may be met).     In sustained remission:   After full criteria for alcohol use disorder were previously met, non of the criteria for alcohol/drug use disorder have been met at any time during a period of 12 months or longer (with the exception that Criterion A4,  Craving or strong desire or urge to use alcohol/drug  may be met).   Specify if:   This additional specifier is used if the individual is in an environment where access to alcohol is restricted.    Mild: Presence of 2-3 symptoms  Moderate: Presence of 4-5 symptoms  Severe: Presence of 6 or more symptoms    DSM-5 Substance Use Disorder Diagnosis     1.)  Alcohol Use Disorder Severe - 303.90 (F10.20)  2.)  Opioid Use Disorder Severe - 304.00 (F11.20)  3.)  Amphetamine Use Disorder Severe - 304.40 (F15.20)  4.)  Cannabis Use Disorder Moderate - 304.30 (F12.20)  5.)  Cocaine Use Disorder Severe - 304.20 (F14.20), in sustained remission  6.)  Tobacco Use Disorder Moderate - 305.10 (F17.200)  7.)  Rule out Depression NOS    Collateral Contact Summary     Number of contacts made:  2    Contact with referring person:  No    If court related records were reviewed, summarize here:  NA    Information from collateral contacts supported/largely agreed with information from the client and associated risk ratings.    Collateral Contact      Name: Relationship: Phone number: Releases:   Electronic medical records at [facility] NA NA No     The patient's Electronic medical records at [facility] were reviewed and the information contained in the medical  records supported the patient's account of his chemical use history and his chemical use consequences.      Collateral Contact      Name: Relationship: Phone number: Releases:   Erinn Shay Girlfriend [phone] Yes     Please see the Concerned Person Information Sheet for details about the collateral information provided by the patient's girlfriend.      Recommendations     1.)  It was recommended that the patient abstain from alcohol and all other non-prescribed mood altering chemicals.   2.)  Have a mental health evaluation to to rule out current clinical mental health issues.  3.)  Follow all of the recommendations of his medical and mental health providers.  4.)  Enter the residential CD treatment program at [facility] in [place], [place] for primary chemical dependency treatment.  5.)  Follow all of the recommendations of his chemical dependency treatment providers including entering an extended care program as needed.      Level of Care   I.) Intoxication and Withdrawal: 0   II.) Biomedical:  2   III.) Emotional and Behavioral:  2   IV.) Readiness to Change:  3   V.) Relapse Potential: 4   VI.) Recovery Environmental: 3     Initial Problem List     The patient lacks relapse prevention skills  The patient has poor coping skills  The patient has poor refusal skills   The patient lacks a sober peer support network  The patient has a tendency to isolate  The patient lacks the ability to effectively manage his/her mental health issues  The patient has a significant history of trauma and/or abuse issues  The patient has a significant history of grief and loss issues

## 2018-01-24 NOTE — PROGRESS NOTES
Outcome of vulnerable Adult Assessment for Outpatients:    1.  Do you have a physical, emotional or mental infirmity or dysfunction?       No    2.  Does this issue impair your ability to provide for your own care without help, including providing yourself with food, shelter, clothing, healthcare or supervision?       No      3.  Because of this issue, I need assistance to protect myself from maltreatment by others.      No    Based on the above information:    This person is not a functional Vulnerable Adult according to [place] Statute 626.5572 subdivision 21.

## 2018-01-25 ASSESSMENT — ANXIETY QUESTIONNAIRES: GAD7 TOTAL SCORE: 3

## 2018-07-20 ENCOUNTER — HOSPITAL ENCOUNTER (EMERGENCY)
Facility: CLINIC | Age: 53
Discharge: PSYCHIATRIC OR SUBSTANCE USE FACILITY | End: 2018-07-21
Attending: EMERGENCY MEDICINE | Admitting: EMERGENCY MEDICINE
Payer: MEDICAID

## 2018-07-20 ENCOUNTER — APPOINTMENT (OUTPATIENT)
Dept: CT IMAGING | Facility: CLINIC | Age: 53
End: 2018-07-20
Attending: EMERGENCY MEDICINE
Payer: MEDICAID

## 2018-07-20 DIAGNOSIS — R45.851 SUICIDAL IDEATION: ICD-10-CM

## 2018-07-20 DIAGNOSIS — S06.0X0A CLOSED HEAD INJURY WITH CONCUSSION, WITHOUT LOSS OF CONSCIOUSNESS, INITIAL ENCOUNTER: ICD-10-CM

## 2018-07-20 DIAGNOSIS — S16.1XXA STRAIN OF NECK MUSCLE, INITIAL ENCOUNTER: ICD-10-CM

## 2018-07-20 LAB
ALBUMIN SERPL-MCNC: 4 G/DL (ref 3.4–5)
ALP SERPL-CCNC: 85 U/L (ref 40–150)
ALT SERPL W P-5'-P-CCNC: 125 U/L (ref 0–70)
AMPHETAMINES UR QL SCN: NEGATIVE
ANION GAP SERPL CALCULATED.3IONS-SCNC: 10 MMOL/L (ref 3–14)
APAP SERPL-MCNC: <2 MG/L (ref 10–20)
AST SERPL W P-5'-P-CCNC: 109 U/L (ref 0–45)
BARBITURATES UR QL: NEGATIVE
BASOPHILS # BLD AUTO: 0.1 10E9/L (ref 0–0.2)
BASOPHILS NFR BLD AUTO: 0.9 %
BENZODIAZ UR QL: NEGATIVE
BILIRUB SERPL-MCNC: 0.3 MG/DL (ref 0.2–1.3)
BUN SERPL-MCNC: 16 MG/DL (ref 7–30)
CALCIUM SERPL-MCNC: 9 MG/DL (ref 8.5–10.1)
CANNABINOIDS UR QL SCN: NEGATIVE
CHLORIDE SERPL-SCNC: 104 MMOL/L (ref 94–109)
CO2 SERPL-SCNC: 27 MMOL/L (ref 20–32)
COCAINE UR QL: NEGATIVE
CREAT SERPL-MCNC: 0.8 MG/DL (ref 0.66–1.25)
DIFFERENTIAL METHOD BLD: ABNORMAL
EOSINOPHIL # BLD AUTO: 0.1 10E9/L (ref 0–0.7)
EOSINOPHIL NFR BLD AUTO: 1.9 %
ERYTHROCYTE [DISTWIDTH] IN BLOOD BY AUTOMATED COUNT: 13.8 % (ref 10–15)
ETHANOL SERPL-MCNC: 0.24 G/DL
GFR SERPL CREATININE-BSD FRML MDRD: >90 ML/MIN/1.7M2
GLUCOSE SERPL-MCNC: 95 MG/DL (ref 70–99)
HCT VFR BLD AUTO: 43.8 % (ref 40–53)
HGB BLD-MCNC: 15 G/DL (ref 13.3–17.7)
IMM GRANULOCYTES # BLD: 0 10E9/L (ref 0–0.4)
IMM GRANULOCYTES NFR BLD: 0.3 %
LYMPHOCYTES # BLD AUTO: 3.2 10E9/L (ref 0.8–5.3)
LYMPHOCYTES NFR BLD AUTO: 42.8 %
MCH RBC QN AUTO: 33.6 PG (ref 26.5–33)
MCHC RBC AUTO-ENTMCNC: 34.2 G/DL (ref 31.5–36.5)
MCV RBC AUTO: 98 FL (ref 78–100)
MONOCYTES # BLD AUTO: 0.6 10E9/L (ref 0–1.3)
MONOCYTES NFR BLD AUTO: 8.6 %
NEUTROPHILS # BLD AUTO: 3.4 10E9/L (ref 1.6–8.3)
NEUTROPHILS NFR BLD AUTO: 45.5 %
NRBC # BLD AUTO: 0 10*3/UL
NRBC BLD AUTO-RTO: 0 /100
OPIATES UR QL SCN: NEGATIVE
PCP UR QL SCN: NEGATIVE
PLATELET # BLD AUTO: 240 10E9/L (ref 150–450)
POTASSIUM SERPL-SCNC: 4.3 MMOL/L (ref 3.4–5.3)
PROT SERPL-MCNC: 7.6 G/DL (ref 6.8–8.8)
RBC # BLD AUTO: 4.47 10E12/L (ref 4.4–5.9)
SODIUM SERPL-SCNC: 141 MMOL/L (ref 133–144)
WBC # BLD AUTO: 7.4 10E9/L (ref 4–11)

## 2018-07-20 PROCEDURE — 80320 DRUG SCREEN QUANTALCOHOLS: CPT | Performed by: EMERGENCY MEDICINE

## 2018-07-20 PROCEDURE — 80053 COMPREHEN METABOLIC PANEL: CPT | Performed by: EMERGENCY MEDICINE

## 2018-07-20 PROCEDURE — 72125 CT NECK SPINE W/O DYE: CPT

## 2018-07-20 PROCEDURE — 99285 EMERGENCY DEPT VISIT HI MDM: CPT | Mod: 25

## 2018-07-20 PROCEDURE — 25000132 ZZH RX MED GY IP 250 OP 250 PS 637: Performed by: EMERGENCY MEDICINE

## 2018-07-20 PROCEDURE — 80329 ANALGESICS NON-OPIOID 1 OR 2: CPT | Performed by: EMERGENCY MEDICINE

## 2018-07-20 PROCEDURE — 70450 CT HEAD/BRAIN W/O DYE: CPT

## 2018-07-20 PROCEDURE — 85025 COMPLETE CBC W/AUTO DIFF WBC: CPT | Performed by: EMERGENCY MEDICINE

## 2018-07-20 PROCEDURE — 80307 DRUG TEST PRSMV CHEM ANLYZR: CPT | Performed by: EMERGENCY MEDICINE

## 2018-07-20 PROCEDURE — 90791 PSYCH DIAGNOSTIC EVALUATION: CPT

## 2018-07-20 RX ORDER — IBUPROFEN 600 MG/1
600 TABLET, FILM COATED ORAL ONCE
Status: COMPLETED | OUTPATIENT
Start: 2018-07-20 | End: 2018-07-20

## 2018-07-20 RX ORDER — LORAZEPAM 1 MG/1
1 TABLET ORAL ONCE
Status: COMPLETED | OUTPATIENT
Start: 2018-07-20 | End: 2018-07-20

## 2018-07-20 RX ORDER — ACETAMINOPHEN 325 MG/1
650 TABLET ORAL ONCE
Status: COMPLETED | OUTPATIENT
Start: 2018-07-20 | End: 2018-07-20

## 2018-07-20 RX ADMIN — ACETAMINOPHEN 650 MG: 325 TABLET, FILM COATED ORAL at 18:57

## 2018-07-20 RX ADMIN — LORAZEPAM 1 MG: 1 TABLET ORAL at 23:06

## 2018-07-20 RX ADMIN — IBUPROFEN 600 MG: 600 TABLET ORAL at 23:07

## 2018-07-20 ASSESSMENT — ENCOUNTER SYMPTOMS
ARTHRALGIAS: 0
NECK PAIN: 1
ABDOMINAL PAIN: 0
HEADACHES: 1
NERVOUS/ANXIOUS: 1
SLEEP DISTURBANCE: 1
MYALGIAS: 0
APPETITE CHANGE: 1
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
## 2018-07-21 ENCOUNTER — HOSPITAL ENCOUNTER (INPATIENT)
Facility: CLINIC | Age: 53
LOS: 2 days | Discharge: HOME | End: 2018-07-23
Attending: PSYCHIATRY & NEUROLOGY | Admitting: PSYCHIATRY & NEUROLOGY
Payer: MEDICAID

## 2018-07-21 VITALS
HEIGHT: 70 IN | OXYGEN SATURATION: 96 % | SYSTOLIC BLOOD PRESSURE: 143 MMHG | BODY MASS INDEX: 28.63 KG/M2 | TEMPERATURE: 98 F | HEART RATE: 91 BPM | WEIGHT: 200 LBS | RESPIRATION RATE: 16 BRPM | DIASTOLIC BLOOD PRESSURE: 88 MMHG

## 2018-07-21 DIAGNOSIS — F33.1 MAJOR DEPRESSIVE DISORDER, RECURRENT EPISODE, MODERATE (H): Primary | ICD-10-CM

## 2018-07-21 PROBLEM — R45.851 SUICIDAL IDEATION: Status: ACTIVE | Noted: 2018-07-21

## 2018-07-21 PROCEDURE — HZ2ZZZZ DETOXIFICATION SERVICES FOR SUBSTANCE ABUSE TREATMENT: ICD-10-PCS | Performed by: PSYCHIATRY & NEUROLOGY

## 2018-07-21 PROCEDURE — 25000132 ZZH RX MED GY IP 250 OP 250 PS 637: Performed by: EMERGENCY MEDICINE

## 2018-07-21 PROCEDURE — 99223 1ST HOSP IP/OBS HIGH 75: CPT | Mod: AI | Performed by: PSYCHIATRY & NEUROLOGY

## 2018-07-21 PROCEDURE — 12400001 ZZH R&B MH UMMC

## 2018-07-21 PROCEDURE — 12800001 ZZH R&B CD/MH ADOLESCENT

## 2018-07-21 PROCEDURE — 25000132 ZZH RX MED GY IP 250 OP 250 PS 637: Performed by: PSYCHIATRY & NEUROLOGY

## 2018-07-21 RX ORDER — TRAZODONE HYDROCHLORIDE 50 MG/1
50 TABLET, FILM COATED ORAL
Status: DISCONTINUED | OUTPATIENT
Start: 2018-07-21 | End: 2018-07-23 | Stop reason: HOSPADM

## 2018-07-21 RX ORDER — ALUMINA, MAGNESIA, AND SIMETHICONE 2400; 2400; 240 MG/30ML; MG/30ML; MG/30ML
30 SUSPENSION ORAL EVERY 4 HOURS PRN
Status: DISCONTINUED | OUTPATIENT
Start: 2018-07-21 | End: 2018-07-23 | Stop reason: HOSPADM

## 2018-07-21 RX ORDER — OLANZAPINE 10 MG/2ML
5-10 INJECTION, POWDER, FOR SOLUTION INTRAMUSCULAR
Status: DISCONTINUED | OUTPATIENT
Start: 2018-07-21 | End: 2018-07-23 | Stop reason: HOSPADM

## 2018-07-21 RX ORDER — HYDROXYZINE HYDROCHLORIDE 25 MG/1
25 TABLET, FILM COATED ORAL EVERY 4 HOURS PRN
Status: DISCONTINUED | OUTPATIENT
Start: 2018-07-21 | End: 2018-07-23 | Stop reason: HOSPADM

## 2018-07-21 RX ORDER — LIDOCAINE 4 G/G
1-2 PATCH TOPICAL
Status: DISCONTINUED | OUTPATIENT
Start: 2018-07-21 | End: 2018-07-23 | Stop reason: HOSPADM

## 2018-07-21 RX ORDER — OLANZAPINE 5 MG/1
5-10 TABLET ORAL
Status: DISCONTINUED | OUTPATIENT
Start: 2018-07-21 | End: 2018-07-23 | Stop reason: HOSPADM

## 2018-07-21 RX ORDER — ESCITALOPRAM OXALATE 10 MG/1
10 TABLET ORAL DAILY
Status: DISCONTINUED | OUTPATIENT
Start: 2018-07-22 | End: 2018-07-23 | Stop reason: HOSPADM

## 2018-07-21 RX ORDER — LIDOCAINE 4 G/G
1 PATCH TOPICAL ONCE
Status: DISCONTINUED | OUTPATIENT
Start: 2018-07-21 | End: 2018-07-21 | Stop reason: HOSPADM

## 2018-07-21 RX ORDER — BISACODYL 10 MG
10 SUPPOSITORY, RECTAL RECTAL DAILY PRN
Status: DISCONTINUED | OUTPATIENT
Start: 2018-07-21 | End: 2018-07-23 | Stop reason: HOSPADM

## 2018-07-21 RX ORDER — NICOTINE 21 MG/24HR
1 PATCH, TRANSDERMAL 24 HOURS TRANSDERMAL DAILY
Status: DISCONTINUED | OUTPATIENT
Start: 2018-07-21 | End: 2018-07-23 | Stop reason: HOSPADM

## 2018-07-21 RX ORDER — ACETAMINOPHEN 325 MG/1
650 TABLET ORAL EVERY 4 HOURS PRN
Status: DISCONTINUED | OUTPATIENT
Start: 2018-07-21 | End: 2018-07-23 | Stop reason: HOSPADM

## 2018-07-21 RX ORDER — ESCITALOPRAM OXALATE 5 MG/1
5 TABLET ORAL DAILY
Status: COMPLETED | OUTPATIENT
Start: 2018-07-21 | End: 2018-07-21

## 2018-07-21 RX ORDER — IBUPROFEN 600 MG/1
600 TABLET, FILM COATED ORAL EVERY 6 HOURS PRN
Status: DISCONTINUED | OUTPATIENT
Start: 2018-07-21 | End: 2018-07-23 | Stop reason: HOSPADM

## 2018-07-21 RX ORDER — ATENOLOL 50 MG/1
50 TABLET ORAL DAILY PRN
Status: DISCONTINUED | OUTPATIENT
Start: 2018-07-21 | End: 2018-07-23 | Stop reason: HOSPADM

## 2018-07-21 RX ORDER — DIAZEPAM 5 MG
5-20 TABLET ORAL EVERY 30 MIN PRN
Status: DISCONTINUED | OUTPATIENT
Start: 2018-07-21 | End: 2018-07-23 | Stop reason: HOSPADM

## 2018-07-21 RX ADMIN — ACETAMINOPHEN 650 MG: 325 TABLET, FILM COATED ORAL at 08:56

## 2018-07-21 RX ADMIN — LIDOCAINE 1 PATCH: 560 PATCH PERCUTANEOUS; TOPICAL; TRANSDERMAL at 00:28

## 2018-07-21 RX ADMIN — ACETAMINOPHEN 650 MG: 325 TABLET, FILM COATED ORAL at 21:37

## 2018-07-21 RX ADMIN — NICOTINE 1 PATCH: 21 PATCH, EXTENDED RELEASE TRANSDERMAL at 08:51

## 2018-07-21 RX ADMIN — IBUPROFEN 600 MG: 600 TABLET ORAL at 16:35

## 2018-07-21 RX ADMIN — HYDROXYZINE HYDROCHLORIDE 25 MG: 25 TABLET ORAL at 03:39

## 2018-07-21 RX ADMIN — DIAZEPAM 5 MG: 5 TABLET ORAL at 21:36

## 2018-07-21 RX ADMIN — DIAZEPAM 5 MG: 5 TABLET ORAL at 16:35

## 2018-07-21 RX ADMIN — ATENOLOL 50 MG: 50 TABLET ORAL at 11:54

## 2018-07-21 RX ADMIN — ESCITALOPRAM 5 MG: 5 TABLET, FILM COATED ORAL at 17:47

## 2018-07-21 ASSESSMENT — ACTIVITIES OF DAILY LIVING (ADL)
AMBULATION: 0-->INDEPENDENT
RETIRED_COMMUNICATION: 0-->UNDERSTANDS/COMMUNICATES WITHOUT DIFFICULTY
RETIRED_EATING: 0-->INDEPENDENT
BATHING: 0-->INDEPENDENT
GROOMING: INDEPENDENT
DRESS: INDEPENDENT
SWALLOWING: 0-->SWALLOWS FOODS/LIQUIDS WITHOUT DIFFICULTY
COGNITION: 0 - NO COGNITION ISSUES REPORTED
DRESS: 0-->INDEPENDENT
ORAL_HYGIENE: INDEPENDENT
TOILETING: 0-->INDEPENDENT
FALL_HISTORY_WITHIN_LAST_SIX_MONTHS: YES
TRANSFERRING: 0-->INDEPENDENT
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
## 2018-07-21 NOTE — ED NOTES
[address] called and is unable to take pt until night shift due to issue in their unit and being short-staffed now.
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
## 2018-07-21 NOTE — IP AVS SNAPSHOT
[facility]    [address]    [place] [address]    Phone:  [phone]                                       After Visit Summary   7/21/2018    Mark Berumen    MRN: [id number]           After Visit Summary Signature Page     I have received my discharge instructions, and my questions have been answered. I have discussed any challenges I see with this plan with the nurse or doctor.    ..........................................................................................................................................  Patient/Patient Representative Signature      ..........................................................................................................................................  Patient Representative Print Name and Relationship to Patient    ..................................................               ................................................  Date                                            Time    ..........................................................................................................................................  Reviewed by Signature/Title    ...................................................              ..............................................  Date                                                            Time

## 2018-07-21 NOTE — ED NOTES
Pt states he's having a panic attack and wants something for anxiety and his head pain. Dr. Camargo informed.

## 2018-07-21 NOTE — ED NOTES
Patient will be admitted to [address] under the care of Dr. Morgan @ [phone]. Please call nurse report at 2200 due to patient acuity levels at this time. MUKUND

## 2018-07-21 NOTE — IP AVS SNAPSHOT
MRN:[id number]                      After Visit Summary   7/21/2018    Mark Berumen    MRN: [id number]           Thank you!     Thank you for choosing [facility] for your care. Our goal is always to provide you with excellent care.        Patient Information     Date Of Birth          1965        Designated Caregiver       Most Recent Value    Caregiver    Will someone help with your care after discharge? yes    Name of designated caregiver Erinn Shay    Phone number of caregiver [phone]    Caregiver address same as pt      About your hospital stay     You were admitted on:  July 21, 2018 You last received care in the:  [facility]    You were discharged on:  July 23, 2018       Who to Call     For medical emergencies, please call 911.  For non-urgent questions about your medical care, please call your primary care provider or clinic, None          Attending Provider     Provider Frandy Ku MD Psychiatry       Primary Care Provider    None Specified      Further instructions from your care team            Behavioral Discharge Planning and Instructions      Summary:  You were admitted on 7/21/2018  due to suicidal ideation and intaoxication.  You were treated by Dr. Frandy Morgan MD. Your symptoms resolved.  You were  discharged on 7/23/2018 from [facility] to Home. You will follow up with substance use treatment and outpatient mental health treatment as well as with your primary care doctor.      Principal Diagnosis:   1.  Major depressive disorder, single episode, severe.   2.  Alcohol use disorder, severe.         Health Care Follow-up Appointments:     You came in without any health insurance.You are a resident of [facility]. You intend to apply for medical assistance.  You completed a phone intake with Betty at [facility] [employer] services @ [phone]. She is forwarding your information to a [employer] worker and they will be contacting you on your cell phone to  schedule an assessment.      A message was left with your psychiatrist to schedule a follow up appointment.  Dr. Baljit Weller  [address] [place] [address]  Phone: [phone]  The Health Unit Coordinator will fax these discharge instructions to Fax: [phone]      You are a patient of Dr. Paredes. You should be seen for a post hospital visit within a week but they said that you would be charged for this visit. Once you get medical assistance, please schedule an appointment as soon as possible.  [facility]  [address], [address]  [place] [address]  Ph: [phone]        Attend all scheduled appointments with your outpatient providers. Call at least 24 hours in advance if you need to reschedule an appointment to ensure continued access to your outpatient providers.   Major Treatments, Procedures and Findings:  You were provided with: a psychiatric assessment, assessed for medical stability, medication evaluation and/or management and group therapy    Your BAL was 0.24.  Drug screen was negative.    Your liver functions are elevated.     (H) 0 - 70 U/L Final 07/20/2018  6:00 PM FrStHsLb      (H) 0 - 45 U/L Final 07/20/2018  6:00 PM FrStHsLb         Symptoms to Report: mood getting worse or thoughts of suicide    Early warning signs can include: increased thoughts or behaviors of suicide or self-harm     Safety and Wellness:  Take all medicines as directed.  Make no changes unless your doctor suggests them.      Follow treatment recommendations.  Refrain from alcohol and non-prescribed drugs.  If there is a concern for safety, call 911.    Resources:   [facility] Crisis Response [phone]      [place] [facility] ([facility])  [facility] connects people seeking recovery to resources that help foster and sustain long-term recovery.  Whether you are seeking resources for treatment, transportation, housing, job training,  "education, health care or other pathways to recovery, [facility] is a great place to start.  [phone]. [email or web address]      The treatment team has appreciated the opportunity to work with you.     If you have any questions or concerns our unit number is [phone]  You may be receiving a follow-up phone call within the next three days from a representative from behavioral health.    You have identified the best phone number to reach you as [phone]        Pending Results     No orders found from 2018 to 2018.            Admission Information     Date & Time Provider Department Dept. Phone    2018 Frandy Morgan MD [facility] [phone]      Your Vitals Were     Blood Pressure Pulse Temperature Respirations Height Weight    145/88 (BP Location: Left arm) 68 95.7  F (35.4  C) 16 1.791 m (5' 10.5\") 98.9 kg (218 lb)    Pulse Oximetry BMI (Body Mass Index)                96% 30.84 kg/m2          [employer] Information     [employer] lets you send messages to your doctor, view your test results, renew your prescriptions, schedule appointments and more. To sign up, go to [email or web address] . Click on \"Log in\" on the left side of the screen, which will take you to the Welcome page. Then click on \"Sign up Now\" on the right side of the page.     You will be asked to enter the access code listed below, as well as some personal information. Please follow the directions to create your username and password.     Your access code is: [email or web address]  Expires: 10/21/2018  1:59 PM     Your access code will  in 90 days. If you need help or a new code, please call your [facility] clinic or [phone].        Care EveryWhere ID     This is your Care EveryWhere ID. This could be used by other organizations to access your [facility] medical records  [id number]        Equal Access to Services     MARLENY ALCANTARA AH: Faraz Garrett, miesha noe, talia stovall " nayla careyreeserick chavez'aan ah. So [facility] [phone].    ATENCIÓN: Si habla alison, tiene a lovelace disposición servicios gratuitos de asistencia lingüística. Serge al [phone].    We comply with applicable federal civil rights laws and [place] laws. We do not discriminate on the basis of race, color, national origin, age, disability, sex, sexual orientation, or gender identity.               Review of your medicines      START taking        Dose / Directions    escitalopram 10 MG tablet   Commonly known as:  LEXAPRO   Used for:  Major depressive disorder, recurrent episode, moderate (H)        Dose:  10 mg   Start taking on:  7/24/2018   Take 1 tablet (10 mg) by mouth daily   Quantity:  30 tablet   Refills:  0            Where to get your medicines      These medications were sent to [facility] [place], [place] - [address]  [address] [facility] [address], [facility] [address]     Phone:  [phone]     escitalopram 10 MG tablet                Protect others around you: Learn how to safely use, store and throw away your medicines at www.disposemymeds.org.             Medication List: This is a list of all your medications and when to take them. Check marks below indicate your daily home schedule. Keep this list as a reference.      Medications           Morning Afternoon Evening Bedtime As Needed    escitalopram 10 MG tablet   Commonly known as:  LEXAPRO   Take 1 tablet (10 mg) by mouth daily   Start taking on:  7/24/2018   Last time this was given:  10 mg on 7/23/2018  8:49 AM

## 2018-07-21 NOTE — ED NOTES
Report received. Assumed care of pt. Pt currently on Health Officer Hold with security present and will remain on that while in the ED. Belongings done by previous staff.

## 2018-07-22 PROCEDURE — 12800005 ZZH R&B CD/MH INTERMEDIATE ADOLESCENT

## 2018-07-22 PROCEDURE — 25000132 ZZH RX MED GY IP 250 OP 250 PS 637: Performed by: PSYCHIATRY & NEUROLOGY

## 2018-07-22 PROCEDURE — 12400007 ZZH R&B MH INTERMEDIATE UMMC

## 2018-07-22 RX ADMIN — DIAZEPAM 10 MG: 5 TABLET ORAL at 04:29

## 2018-07-22 RX ADMIN — IBUPROFEN 600 MG: 600 TABLET ORAL at 08:54

## 2018-07-22 RX ADMIN — ESCITALOPRAM OXALATE 10 MG: 10 TABLET ORAL at 08:54

## 2018-07-22 RX ADMIN — DIAZEPAM 10 MG: 5 TABLET ORAL at 00:35

## 2018-07-22 RX ADMIN — LIDOCAINE 1 PATCH: 560 PATCH PERCUTANEOUS; TOPICAL; TRANSDERMAL at 08:55

## 2018-07-22 RX ADMIN — DIAZEPAM 5 MG: 5 TABLET ORAL at 12:50

## 2018-07-22 RX ADMIN — ATENOLOL 50 MG: 50 TABLET ORAL at 16:08

## 2018-07-22 RX ADMIN — DIAZEPAM 10 MG: 5 TABLET ORAL at 08:58

## 2018-07-22 RX ADMIN — IBUPROFEN 600 MG: 600 TABLET ORAL at 23:56

## 2018-07-22 RX ADMIN — NICOTINE 1 PATCH: 21 PATCH, EXTENDED RELEASE TRANSDERMAL at 08:54

## 2018-07-22 ASSESSMENT — ACTIVITIES OF DAILY LIVING (ADL)
ORAL_HYGIENE: INDEPENDENT
GROOMING: INDEPENDENT
DRESS: INDEPENDENT
LAUNDRY: WITH SUPERVISION
GROOMING: INDEPENDENT
ORAL_HYGIENE: INDEPENDENT
DRESS: INDEPENDENT

## 2018-07-22 NOTE — PROGRESS NOTES
Pt denies SI/SIB. Pt reports anxiety and depression. Pt stated he must stop drinking., pt hopeful to get rest this evening.      07/21/18 0089   Behavioral Health   Hallucinations denies / not responding to hallucinations   Thinking intact   [place] person: oriented;place: oriented   Memory baseline memory   Insight admits / accepts   Judgement intact   Eye Contact at examiner   Affect full range affect   Mood depressed;anxious   Suicidality other (see comments)  (pt denies)   Self Injury other (see comment)  (pt denies)   Activity withdrawn   Activities of Daily Living   Hygiene/Grooming independent   Oral Hygiene independent   Dress independent   Room Organization independent   Behavioral Health Interventions   Depression maintain safety precautions;maintain safe secure environment;provide emotional support   Social and Therapeutic Interventions (Depression) encourage socialization with peers;encourage effective boundaries with peers;encourage participation in therapeutic groups and milieu activities

## 2018-07-22 NOTE — PROGRESS NOTES
07/22/18 1400   Behavioral Health   Hallucinations denies / not responding to hallucinations   Thinking intact   [place] person: oriented;place: oriented;time: oriented;date: oriented   Memory baseline memory   Insight insight appropriate to situation   Judgement intact   Eye Contact at examiner   Affect blunted, flat   Mood mood is calm   Physical Appearance/Attire attire appropriate to age and situation   Hygiene neglected grooming - unclean body, hair, teeth   Suicidality (demies)   Self Injury (denies)   Elopement (no signs)   Activity withdrawn   Speech clear;coherent   Medication Sensitivity no stated side effects;no observed side effects   Psychomotor / Gait balanced;steady   Psycho Education   Type of Intervention 1:1 intervention   Response participates, initiates socially appropriate   Hours 0.5   Treatment Detail check in   Activities of Daily Living   Hygiene/Grooming independent   Oral Hygiene independent   Dress independent   Room Organization independent   Pt was withdrawn to room and watched a movie in the lounge.  Pt stated that he rested because he did not sleep well yesterday evening from withdrawal, stated that he is feeling much better today after sleeping.  Pt denies SI/SIB, no medication side effects reported.  Pt was friendly upon approach otherwise withdrawn.

## 2018-07-23 VITALS
HEIGHT: 71 IN | HEART RATE: 68 BPM | SYSTOLIC BLOOD PRESSURE: 145 MMHG | WEIGHT: 218 LBS | RESPIRATION RATE: 16 BRPM | TEMPERATURE: 95.7 F | DIASTOLIC BLOOD PRESSURE: 88 MMHG | OXYGEN SATURATION: 96 % | BODY MASS INDEX: 30.52 KG/M2

## 2018-07-23 PROCEDURE — G0177 OPPS/PHP; TRAIN & EDUC SERV: HCPCS

## 2018-07-23 PROCEDURE — 25000132 ZZH RX MED GY IP 250 OP 250 PS 637: Performed by: PSYCHIATRY & NEUROLOGY

## 2018-07-23 PROCEDURE — 99239 HOSP IP/OBS DSCHRG MGMT >30: CPT | Performed by: PSYCHIATRY & NEUROLOGY

## 2018-07-23 RX ORDER — ESCITALOPRAM OXALATE 10 MG/1
10 TABLET ORAL DAILY
Qty: 30 TABLET | Refills: 0 | Status: SHIPPED | OUTPATIENT
Start: 2018-07-24 | End: 2018-09-06

## 2018-07-23 RX ADMIN — IBUPROFEN 600 MG: 600 TABLET ORAL at 08:49

## 2018-07-23 RX ADMIN — LIDOCAINE 1 PATCH: 560 PATCH PERCUTANEOUS; TOPICAL; TRANSDERMAL at 08:48

## 2018-07-23 RX ADMIN — NICOTINE 1 PATCH: 21 PATCH, EXTENDED RELEASE TRANSDERMAL at 08:48

## 2018-07-23 RX ADMIN — ESCITALOPRAM OXALATE 10 MG: 10 TABLET ORAL at 08:49

## 2018-07-23 NOTE — PLAN OF CARE
Problem: Depressive Symptoms  Goal: Depressive Symptoms  Signs and symptoms of listed problems will be absent or manageable.   Outcome: Adequate for Discharge Date Met: 07/23/18  Patient discharging home. Knows his medication regime and his course of follow-up appointments, Patient displays a full range of affect and denies any suicidal ideation.

## 2018-07-23 NOTE — PROGRESS NOTES
Behavioral Discharge Planning and Instructions      Summary:  You were admitted on 7/21/2018  due to suicidal ideation and intaoxication.  You were treated by Dr. Frandy Morgan MD. Your symptoms resolved.  You were  discharged on 7/23/2018 from [facility] to Home. You will follow up with substance use treatment and outpatient mental health treatment as well as with your primary care doctor.      Principal Diagnosis:   1.  Major depressive disorder, single episode, severe.   2.  Alcohol use disorder, severe.         Health Care Follow-up Appointments:     You came in without any health insurance.You are a resident of [facility]. You intend to apply for medical assistance.  You completed a phone intake with Betty at [facility] [employer] services @ [phone]. She is forwarding your information to a [employer] worker and they will be contacting you on your cell phone to schedule an assessment.      A message was left with your psychiatrist to schedule a follow up appointment.  Dr. Baljit Weller  [address] [place] [address]  Phone: [phone]  The Health Unit Coordinator will fax these discharge instructions to Fax: [phone]      You are a patient of Dr. Paredes. You should be seen for a post hospital visit within a week but they said that you would be charged for this visit. Once you get medical assistance, please schedule an appointment as soon as possible.  [address], [address]  [place] [address]  Ph: [phone]        Attend all scheduled appointments with your outpatient providers. Call at least 24 hours in advance if you need to reschedule an appointment to ensure continued access to your outpatient providers.   Major Treatments, Procedures and Findings:  You were provided with: a psychiatric assessment, assessed for medical stability, medication evaluation and/or management and group therapy    Your BAL was  0.24.  Drug screen was negative.    Your liver functions are elevated.     (H) 0 - 70 U/L Final 07/20/2018  6:00 PM FrStHsLb      (H) 0 - 45 U/L Final 07/20/2018  6:00 PM FrStHsLb         Symptoms to Report: mood getting worse or thoughts of suicide    Early warning signs can include: increased thoughts or behaviors of suicide or self-harm     Safety and Wellness:  Take all medicines as directed.  Make no changes unless your doctor suggests them.      Follow treatment recommendations.  Refrain from alcohol and non-prescribed drugs.  If there is a concern for safety, call 911.    Resources:   [facility] Crisis Response [phone]      [place] [facility] ([facility])  [facility] connects people seeking recovery to resources that help foster and sustain long-term recovery.  Whether you are seeking resources for treatment, transportation, housing, job training, education, health care or other pathways to recovery, [facility] is a great place to start.  [phone]. [email or web address]      The treatment team has appreciated the opportunity to work with you.     If you have any questions or concerns our unit number is [phone]  You may be receiving a follow-up phone call within the next three days from a representative from behavioral health.    You have identified the best phone number to reach you as [phone]

## 2018-07-23 NOTE — PLAN OF CARE
Problem: Patient Care Overview  Goal: Team Discussion  Team Plan:   Outcome: Adequate for Discharge Date Met: 07/23/18  BEHAVIORAL TEAM DISCUSSION    Participants:   Kimberly Alamo, Tami Davis OT, Kimi Ken RN    Progress:  This 53 year old male was admitted for intoxication and suicidal ideation.  Drug screen was negative. BAL was 0.24.  Pt's suicidality has resolved.  Pt needs follow up treatment.  An anti-depressant has been started.  Pt scored on the alcohol withdrawal scale and was given valium. He did not score last night.    Continued Stay Criteria/Rationale:   Pt will be discharged when    Medical/Physical:   Liver functions are elevated.    Precautions:   Behavioral Orders   Procedures     Code 1 - Restrict to Unit     Routine Programming     As clinically indicated     Status 15     Every 15 minutes.     Suicide precautions     Patients on Suicide Precautions should have a Combination Diet ordered that includes a Diet selection(s) AND a Behavioral Tray selection for Safe Tray - with utensils, or Safe Tray - NO utensils       Plan:   Anti-depressant - with ongoing management  Return home  Obtain Rule 25 assessment  Obtain after care for elevated liver functions      Rationale for change in precautions or plan:   Initial review

## 2018-07-23 NOTE — DISCHARGE INSTRUCTIONS
Behavioral Discharge Planning and Instructions      Summary:  You were admitted on 7/21/2018  due to suicidal ideation and intaoxication.  You were treated by Dr. Frandy Morgan MD. Your symptoms resolved.  You were  discharged on 7/23/2018 from [facility] to Home. You will follow up with substance use treatment and outpatient mental health treatment as well as with your primary care doctor.      Principal Diagnosis:   1.  Major depressive disorder, single episode, severe.   2.  Alcohol use disorder, severe.         Health Care Follow-up Appointments:     You came in without any health insurance.You are a resident of [facility]. You intend to apply for medical assistance.  You completed a phone intake with Betty at [facility] [employer] services @ [phone]. She is forwarding your information to a [employer] worker and they will be contacting you on your cell phone to schedule an assessment.      A message was left with your psychiatrist to schedule a follow up appointment.  Dr. Baljit Weller  [address] [place] [address]  Phone: [phone]  The Health Unit Coordinator will fax these discharge instructions to Fax: [phone]      You are a patient of Dr. Paredes. You should be seen for a post hospital visit within a week but they said that you would be charged for this visit. Once you get medical assistance, please schedule an appointment as soon as possible.  [address], [address]  [place] [address]  Ph: [phone]        Attend all scheduled appointments with your outpatient providers. Call at least 24 hours in advance if you need to reschedule an appointment to ensure continued access to your outpatient providers.   Major Treatments, Procedures and Findings:  You were provided with: a psychiatric assessment, assessed for medical stability, medication evaluation and/or management and group therapy    Your BAL was  0.24.  Drug screen was negative.    Your liver functions are elevated.     (H) 0 - 70 U/L Final 07/20/2018  6:00 PM FrStHsLb      (H) 0 - 45 U/L Final 07/20/2018  6:00 PM FrStHsLb         Symptoms to Report: mood getting worse or thoughts of suicide    Early warning signs can include: increased thoughts or behaviors of suicide or self-harm     Safety and Wellness:  Take all medicines as directed.  Make no changes unless your doctor suggests them.      Follow treatment recommendations.  Refrain from alcohol and non-prescribed drugs.  If there is a concern for safety, call 911.    Resources:   [facility] Crisis Response [phone]      [place] [facility] ([facility])  [facility] connects people seeking recovery to resources that help foster and sustain long-term recovery.  Whether you are seeking resources for treatment, transportation, housing, job training, education, health care or other pathways to recovery, [facility] is a great place to start.  [phone]. [email or web address]      The treatment team has appreciated the opportunity to work with you.     If you have any questions or concerns our unit number is [phone]  You may be receiving a follow-up phone call within the next three days from a representative from behavioral health.    You have identified the best phone number to reach you as [phone]

## 2018-07-23 NOTE — PLAN OF CARE
"Problem: Depressive Symptoms  Goal: Depressive Symptoms  Signs and symptoms of listed problems will be absent or manageable.   Outcome: Improving  Patient will report reduction in depression and anxiety  Patient will deny suicidal and self-harm thoughts  Patient will report improved sleep as evidenced by less difficulty falling asleep and staying asleep    Comments: Patient reports he \"feels human again.\" States withdrawal symptoms are minimum. Patient reports improved mood and minimal anxiety today. Denies SI/SIB.   Patient showered today. Appetite is good, states sleep was difficult last night, but feels he will sleep ok tonight.     "

## 2018-07-23 NOTE — DISCHARGE SUMMARY
[facility]  Department of Psychiatry    DATE OF ADMISSION:  7/21/2018    DATE OF DISCHARGE:  July 23, 2018    DISCHARGE DIAGNOSES:   1.  Major depressive disorder, single episode, severe.   2.  Alcohol use disorder, severe.     HOSPITAL COURSE: (Refer to H&P, progress notes, and consult notes for details)    The patient was admitted to our Behavioral Health Unit under a 72 hour hold for depressed mood and suicidal thoughts in the setting of alcohol dependence and active usage.  He was willing to continue hospitalization voluntarily.  Lexapro was started to address depressive symptoms.  He was detoxed from alcohol without complication.  Suicidal thoughts quickly remitted and his mood began to improve.  He met with her  to help arrange a chemical health assessment which was completed during his hospital course.  After achieving his treatment goals, he requested to be discharged home as he awaited further chemical addiction treatment and outpatient mental health appointments.    Mood and anxiety-related symptoms had improved by the time of discharge and the patient denied suicidal and homicidal thoughts, plans, or intent.  Treatment team felt the patient was stable and ready for discharge.    No restraints or seclusions were required during their hospitalization.  No allergies or severe adverse reactions to the medications were noted.    Other interventions received during his hospitalization included:   Psychosocial treatments were addressed with groups, social work consult, and supportive milieu provided by staff.    CONDITION AT DISCHARGE:  Improved.  The patients acute suicide risk is low due to the following factors:  improved mood/anxiety symptoms.  Denies suicidal ideations. Denies psychotic symptoms.  Not actively intoxicated and plans to abstain from illicit substances and alcohol.  Denies access to guns.  Denies feeling hopeless or helpless. At the time of  discharge Mark Berumen was determined to not be an immediate danger to herself or others. The patient's acute risk will be higher if noncompliant with treatment plan, medications, follow-up or using illicit substances or alcohol.  These findings along with the risks of noncompliance with medications and treatment plan, which could potentially cause decompensation and increase the risk for suicide, were discussed with the patient.  The patients chronic suicide risk is moderate given the following factors: white race; diagnosis of MDD, history of chemical dependency; Denied a family history of suicide.  Preventative factors include: social supports, stable housing     MENTAL STATUS EXAMINATION AT TIME OF DISCHARGE:  The patient is 53 year old White male who appears their stated age and is appropriately dressed with good hygiene.  Calm and cooperative with the interview questions.  No psychomotor abnormalities are noted. Eye contact is appropriate. Speech has normal rate, tone, latency and volume and is not pushed or pressured. Mood is euthymic and affect is full and appropriate.  The patient does not seem overtly depressed, anxious, manic or irritable.  Thought process is linear, logical and future oriented.  Thought process is not tangential, circumstantial or disorganized.  Thought content is not significant for apperant paranoia, delusions, ideas of reference or grandiosity.  The patient denies suicidal and homicidal ideations as well as auditory and visual hallucinations.  Insight and judgment are fair.  Cognition appears intact to interviewing including orientation, recent and remote memory, fund of knowledge, use of language, attention span and concentration.  Muscle strength, tone and gait appear normal on visual inspection.      DISPOSITION:  The patient is discharged home     FOLLOWUP APPOINTMENTS:  ( per social workers notes and after visit summary)  1.  Rule [employer]  will contact the patient after  discharge once funding has been approved for a chemical addiction treatment facility  2.  A message was left with his psychiatrist requesting scheduling for a follow-up appointment    DISCHARGE MEDICATIONS:   Current Discharge Medication List      START taking these medications    Details   escitalopram (LEXAPRO) 10 MG tablet Take 1 tablet (10 mg) by mouth daily  Qty: 30 tablet, Refills: 0    Associated Diagnoses: Major depressive disorder, recurrent episode, moderate (H)              LABORATORY RESULTS: (past 14 days)  Recent Results (from the past 336 hour(s))   CBC with platelets + differential    Collection Time: 07/20/18  6:00 PM   Result Value Ref Range    WBC 7.4 4.0 - 11.0 10e9/L    RBC Count 4.47 4.4 - 5.9 10e12/L    Hemoglobin 15.0 13.3 - 17.7 g/dL    Hematocrit 43.8 40.0 - 53.0 %    MCV 98 78 - 100 fl    MCH 33.6 (H) 26.5 - 33.0 pg    MCHC 34.2 31.5 - 36.5 g/dL    RDW 13.8 10.0 - 15.0 %    Platelet Count 240 150 - 450 10e9/L    Diff Method Automated Method     % Neutrophils 45.5 %    % Lymphocytes 42.8 %    % Monocytes 8.6 %    % Eosinophils 1.9 %    % Basophils 0.9 %    % Immature Granulocytes 0.3 %    Nucleated RBCs 0 0 /100    Absolute Neutrophil 3.4 1.6 - 8.3 10e9/L    Absolute Lymphocytes 3.2 0.8 - 5.3 10e9/L    Absolute Monocytes 0.6 0.0 - 1.3 10e9/L    Absolute Eosinophils 0.1 0.0 - 0.7 10e9/L    Absolute Basophils 0.1 0.0 - 0.2 10e9/L    Abs Immature Granulocytes 0.0 0 - 0.4 10e9/L    Absolute Nucleated RBC 0.0    Acetaminophen level    Collection Time: 07/20/18  6:00 PM   Result Value Ref Range    Acetaminophen Level <2 mg/L   Comprehensive metabolic panel    Collection Time: 07/20/18  6:00 PM   Result Value Ref Range    Sodium 141 133 - 144 mmol/L    Potassium 4.3 3.4 - 5.3 mmol/L    Chloride 104 94 - 109 mmol/L    Carbon Dioxide 27 20 - 32 mmol/L    Anion Gap 10 3 - 14 mmol/L    Glucose 95 70 - 99 mg/dL    Urea Nitrogen 16 7 - 30 mg/dL    Creatinine 0.80 0.66 - 1.25 mg/dL    GFR Estimate >90  >60 mL/min/1.7m2    GFR Estimate If Black >90 >60 mL/min/1.7m2    Calcium 9.0 8.5 - 10.1 mg/dL    Bilirubin Total 0.3 0.2 - 1.3 mg/dL    Albumin 4.0 3.4 - 5.0 g/dL    Protein Total 7.6 6.8 - 8.8 g/dL    Alkaline Phosphatase 85 40 - 150 U/L     (H) 0 - 70 U/L     (H) 0 - 45 U/L   Alcohol level blood    Collection Time: 07/20/18  6:00 PM   Result Value Ref Range    Ethanol g/dL 0.24 (H) <0.01 g/dL   Drug abuse screen urine    Collection Time: 07/20/18  6:50 PM   Result Value Ref Range    Amphetamine Qual Urine Negative NEG^Negative    Barbiturates Qual Urine Negative NEG^Negative    Benzodiazepine Qual Urine Negative NEG^Negative    Cannabinoids Qual Urine Negative NEG^Negative    Cocaine Qual Urine Negative NEG^Negative    Opiates Qualitative Urine Negative NEG^Negative    PCP Qual Urine Negative NEG^Negative       >30 minutes was spent on this discharge to allow for reviewing the patient's response to treatment, reviewing plan of care, education on medications and diagnosis, and conducting a risk assessment.

## 2018-07-23 NOTE — PROGRESS NOTES
INITIAL NOTE: Mark participated in 1/3 OT groups. Groups provided verbal psychoeducation and coping strategies by facilitating group discussion and structured activity. Topics emphasized included gratitude and self-compassion. Skill development promoted emotional awareness, self-regulation, self-esteem, concentration and acceptance. He was actively engaged and social with peers. He integrated information and utilized humor appropriately. He engaged in casual social conversation while he worked and completed goal-directed task in allotted time. His affect was appropriate and bright.

## 2018-09-06 ENCOUNTER — TELEPHONE (OUTPATIENT)
Dept: PALLIATIVE MEDICINE | Facility: CLINIC | Age: 53
End: 2018-09-06

## 2018-09-06 ENCOUNTER — OFFICE VISIT (OUTPATIENT)
Dept: BEHAVIORAL HEALTH | Facility: CLINIC | Age: 53
End: 2018-09-06
Payer: COMMERCIAL

## 2018-09-06 ENCOUNTER — HOSPITAL ENCOUNTER (OUTPATIENT)
Dept: BEHAVIORAL HEALTH | Facility: CLINIC | Age: 53
End: 2018-09-06
Attending: FAMILY MEDICINE
Payer: COMMERCIAL

## 2018-09-06 VITALS
SYSTOLIC BLOOD PRESSURE: 130 MMHG | TEMPERATURE: 96.7 F | DIASTOLIC BLOOD PRESSURE: 70 MMHG | OXYGEN SATURATION: 95 % | HEART RATE: 69 BPM | RESPIRATION RATE: 16 BRPM

## 2018-09-06 VITALS
WEIGHT: 218 LBS | BODY MASS INDEX: 31.21 KG/M2 | DIASTOLIC BLOOD PRESSURE: 98 MMHG | HEART RATE: 78 BPM | TEMPERATURE: 97.8 F | HEIGHT: 70 IN | SYSTOLIC BLOOD PRESSURE: 150 MMHG

## 2018-09-06 DIAGNOSIS — F33.1 MAJOR DEPRESSIVE DISORDER, RECURRENT EPISODE, MODERATE (H): ICD-10-CM

## 2018-09-06 DIAGNOSIS — F17.200 TOBACCO DEPENDENCE SYNDROME: ICD-10-CM

## 2018-09-06 DIAGNOSIS — Z02.89 HEALTH EXAMINATION OF DEFINED SUBPOPULATION: Primary | ICD-10-CM

## 2018-09-06 DIAGNOSIS — J45.20 MILD INTERMITTENT ASTHMA, UNCOMPLICATED: ICD-10-CM

## 2018-09-06 DIAGNOSIS — F33.9 RECURRENT MAJOR DEPRESSIVE DISORDER, REMISSION STATUS UNSPECIFIED (H): ICD-10-CM

## 2018-09-06 DIAGNOSIS — G89.4 CHRONIC PAIN SYNDROME: ICD-10-CM

## 2018-09-06 DIAGNOSIS — F10.90 ALCOHOL USE DISORDER: ICD-10-CM

## 2018-09-06 DIAGNOSIS — G89.4 CHRONIC PAIN SYNDROME: Primary | ICD-10-CM

## 2018-09-06 PROBLEM — F19.20 CHEMICAL DEPENDENCY (H): Status: ACTIVE | Noted: 2018-09-06

## 2018-09-06 PROCEDURE — H2035 A/D TX PROGRAM, PER HOUR: HCPCS | Mod: HQ

## 2018-09-06 PROCEDURE — 40000007 ZZH STATISTIC ADULT CD FACE TO FACE-NO CHRG

## 2018-09-06 PROCEDURE — 99214 OFFICE O/P EST MOD 30 MIN: CPT | Performed by: NURSE PRACTITIONER

## 2018-09-06 PROCEDURE — H2035 A/D TX PROGRAM, PER HOUR: HCPCS

## 2018-09-06 PROCEDURE — 10020000 ZZH LODGING PLUS FACILITY CHARGE ADULT

## 2018-09-06 RX ORDER — AMOXICILLIN 250 MG
2 CAPSULE ORAL DAILY PRN
COMMUNITY
End: 2018-09-29

## 2018-09-06 RX ORDER — MAGNESIUM HYDROXIDE/ALUMINUM HYDROXICE/SIMETHICONE 120; 1200; 1200 MG/30ML; MG/30ML; MG/30ML
30 SUSPENSION ORAL EVERY 6 HOURS PRN
COMMUNITY
End: 2018-09-29

## 2018-09-06 RX ORDER — IBUPROFEN 800 MG/1
800 TABLET, FILM COATED ORAL EVERY 8 HOURS PRN
Qty: 30 TABLET | Refills: 1 | Status: ON HOLD | OUTPATIENT
Start: 2018-09-06 | End: 2019-03-15

## 2018-09-06 RX ORDER — ESCITALOPRAM OXALATE 10 MG/1
10 TABLET ORAL DAILY
Qty: 30 TABLET | Refills: 0 | Status: ON HOLD | OUTPATIENT
Start: 2018-09-06 | End: 2019-03-15

## 2018-09-06 RX ORDER — LORATADINE 10 MG/1
10 TABLET ORAL DAILY PRN
COMMUNITY
End: 2018-09-29

## 2018-09-06 RX ORDER — NICOTINE 21 MG/24HR
1 PATCH, TRANSDERMAL 24 HOURS TRANSDERMAL EVERY 24 HOURS
Qty: 30 PATCH | Refills: 3 | Status: ON HOLD | OUTPATIENT
Start: 2018-09-06 | End: 2019-03-15

## 2018-09-06 RX ORDER — ALBUTEROL SULFATE 90 UG/1
2 AEROSOL, METERED RESPIRATORY (INHALATION) EVERY 6 HOURS PRN
Qty: 1 INHALER | Refills: 1 | Status: ON HOLD | OUTPATIENT
Start: 2018-09-06 | End: 2019-03-18

## 2018-09-06 ASSESSMENT — ANXIETY QUESTIONNAIRES
6. BECOMING EASILY ANNOYED OR IRRITABLE: MORE THAN HALF THE DAYS
2. NOT BEING ABLE TO STOP OR CONTROL WORRYING: SEVERAL DAYS
5. BEING SO RESTLESS THAT IT IS HARD TO SIT STILL: NOT AT ALL
IF YOU CHECKED OFF ANY PROBLEMS ON THIS QUESTIONNAIRE, HOW DIFFICULT HAVE THESE PROBLEMS MADE IT FOR YOU TO DO YOUR WORK, TAKE CARE OF THINGS AT HOME, OR GET ALONG WITH OTHER PEOPLE: VERY DIFFICULT
3. WORRYING TOO MUCH ABOUT DIFFERENT THINGS: SEVERAL DAYS
1. FEELING NERVOUS, ANXIOUS, OR ON EDGE: SEVERAL DAYS
7. FEELING AFRAID AS IF SOMETHING AWFUL MIGHT HAPPEN: NOT AT ALL
4. TROUBLE RELAXING: NOT AT ALL
GAD7 TOTAL SCORE: 5

## 2018-09-06 ASSESSMENT — PAIN SCALES - GENERAL: PAINLEVEL: MODERATE PAIN (4)
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
## 2018-09-06 NOTE — PATIENT INSTRUCTIONS
Referral to our pain clinic, we will work on getting it set up.  Ibuprofen 800 mg up to three x per day if needed.  Lexapro was refilled.  Rescue inhaler was ordered, nicotine patch was ordered.

## 2018-09-06 NOTE — MR AVS SNAPSHOT
After Visit Summary   9/6/2018    Mark Berumen    MRN: [id number]           Patient Information     Date Of Birth          1965        Visit Information        Provider Department      9/6/2018 4:00 PM Liza Rodriguez APRN [facility]        Today's Diagnoses     Chronic pain syndrome    -  1    Recurrent major depressive disorder, remission status unspecified (H)        Major depressive disorder, recurrent episode, moderate (H)        Mild intermittent asthma, uncomplicated        Health examination of defined subpopulation        Tobacco dependence syndrome          Care Instructions    Referral to our pain clinic, we will work on getting it set up.  Ibuprofen 800 mg up to three x per day if needed.  Lexapro was refilled.  Rescue inhaler was ordered, nicotine patch was ordered.            Follow-ups after your visit        Additional Services     PAIN MANAGEMENT REFERRAL       Your provider has referred you to: [facility]: [facility] -    Reason for Referral: Evaluation for comprehensive services- patients will be evaluated if appropriate for comprehensive service including medication changes, procedures, pain psychology, and pain physical therapy.  While involved with comprehensive services, pain providers will work with referring provider/PCP to stabilize appropriate medication management, with long-term plan of transition of prescribing back to referring provider/PCP upon completion of comprehensive services.      Please complete the following questions:    Do you have any specific questions for the pain specialist? No    Are there any red flags that may impact the assessment or management of the patient? Active or recent alcohol, drug or prescription medication misuse (explain): in treatment at . Has gone to a pain clinic [facility] in [place] in the past.       What is your diagnosis for the patient's pain? Four bulging discs, hx cervical  fusion x 2, traumatic injury years ago.    Has been on Methadone, percocet.  At  for alcohol use disorder      For any questions, contact the [facility] at [phone].     **ANY DIAGNOSTIC TESTS THAT ARE NOT IN EPIC SHOULD BE SENT TO THE PAIN CENTER**    REGARDING OPIOID MEDICATIONS:  The discussion of opioids management, appropriateness of therapy, and dosing will be discussed in patients being seen for evaluation.  The pain management clinics are not long-term prescribing clinics, with transition of prescribing of medications ultimately going back to the referring provider/PCP.  If prescribing is taken over at the pain clinic, it is in actively involved patients whom are appropriate for opioids, urine drug screening is completed, and long-term prescribing plan has been determined.  Therefore, we will not be automatically taking over prescribing at the patient's first visit.  Is this agreeable to you? agrees.     Please be aware that coverage of these services is subject to the terms and limitations of your health insurance plan.  Call member services at your health plan with any benefit or coverage questions.      Please bring the following with you to your appointment:    (1) Any X-Rays, CTs or MRIs which have been performed.  Contact the facility where they were done to arrange for  prior to your scheduled appointment.    (2) List of current medications   (3) This referral request   (4) Any documents/labs given to you for this referral                  Your next 10 appointments already scheduled     Sep 07, 2018  7:15 AM CDT   Treatment with ADULT LODGING PLUS B   [facility] ([facility])    [facility] [address] [address]   [phone]            Sep 08, 2018  7:15 AM CDT   Treatment with ADULT LODGING PLUS B   [facility] ([facility]  [place])    [facility] [address] [address]   [phone]            Sep 09, 2018  7:15 AM CDT   Treatment with ADULT LODGING PLUS B   [facility] ([facility])    [address] [place] [address]   [phone]            Sep 10, 2018  7:15 AM CDT   Treatment with ADULT LODGING PLUS B   [facility] ([facility])    [address] [place] [address]   [phone]            Sep 11, 2018  7:15 AM CDT   Treatment with ADULT LODGING PLUS B   [facility] ([facility])    [address] [place] [address]   [phone]            Sep 12, 2018  7:15 AM CDT   Treatment with ADULT LODGING PLUS B   [facility] ([facility])    [address] [place] [address]   [phone]            Sep 13, 2018  7:15 AM CDT   Treatment with ADULT LODGING PLUS B   [facility] ([facility])    [address] [place] [address]   [phone]            Sep 14, 2018  7:15 AM CDT   Treatment with ADULT LODGING PLUS B   [facility] ([facility])    [address] [place] [address]   [phone]            Sep 15, 2018  7:15 AM CDT   Treatment with ADULT LODGING PLUS B   [facility] ([facility])    [address] [place] [address]   [phone]            Sep 16, 2018  7:15 AM CDT   Treatment with ADULT LODGING PLUS B   [facility] ([facility])    [address] [place] [address]   [phone]              Future tests  "that were ordered for you today     Open Standing Orders        Priority Remaining Interval Expires Ordered    Patient care order: Alcohol breath test Routine [id number] PRN  9/6/2018    Patient care order: Drug screen urine qualitative Routine [id number] PRN  9/6/2018    Drug abuse screen 8 urine (UR) Routine 100/100 CONDITIONAL (SPECIFY)  9/6/2018    Synthetic Cannabinoid Met Qual Ur Routine 100/100 CONDITIONAL (SPECIFY)  9/6/2018    Ethyl Glucuronide Urine Routine 100/100 CONDITIONAL (SPECIFY)  9/6/2018    Buprenorphine & Metabolite Screen Routine 100/100 CONDITIONAL (SPECIFY)  9/6/2018    Creatinine random urine Routine 100/100 CONDITIONAL (SPECIFY)  9/6/2018            Who to contact     If you have questions or need follow up information about today's clinic visit or your schedule please contact [facility] directly at [phone].  Normal or non-critical lab and imaging results will be communicated to you by Folloyuhart, letter or phone within 4 business days after the clinic has received the results. If you do not hear from us within 7 days, please contact the clinic through Folloyuhart or phone. If you have a critical or abnormal lab result, we will notify you by phone as soon as possible.  Submit refill requests through [facility] F[employer]rsat or call your pharmacy and they will forward the refill request to us. Please allow 3 business days for your refill to be completed.          Additional Information About Your Visit        FolloyuharMedprivÃ© Information     F[employer]rsat [employer] F[employer]rsat lets you send messages to your doctor, view your test results, renew your prescriptions, schedule appointments and more. To sign up, go to [email or web address] . Click on \"Log in\" on the left side of the screen, which will take you to the Welcome page. Then click on \"Sign up Now\" on the right side of the page.     You will be asked to enter the access code listed below, as well as some personal information. Please follow the directions to " create your username and password.     Your access code is: [email or web address]  Expires: 10/21/2018  1:59 PM     Your access code will  in 90 days. If you need help or a new code, please call your [facility] clinic or [phone].        Care EveryWhere ID     This is your Care EveryWhere ID. This could be used by other organizations to access your [facility] medical records  [id number]        Your Vitals Were     Pulse Temperature Respirations Pulse Oximetry          69 96.7  F (35.9  C) (Temporal) 16 95%         Blood Pressure from Last 3 Encounters:   18 130/70   18 (!) 150/98   18 145/88    Weight from Last 3 Encounters:   18 218 lb (98.9 kg)   18 218 lb (98.9 kg)   18 200 lb (90.7 kg)              We Performed the Following     PAIN MANAGEMENT REFERRAL          Today's Medication Changes          These changes are accurate as of 18  4:24 PM.  If you have any questions, ask your nurse or doctor.               Start taking these medicines.        Dose/Directions    albuterol 108 (90 Base) MCG/ACT inhaler   Commonly known as:  PROAIR HFA/PROVENTIL HFA/VENTOLIN HFA   Used for:  Mild intermittent asthma, uncomplicated   Started by:  Liza Rodriguez APRN CNP        Dose:  2 puff   Inhale 2 puffs into the lungs every 6 hours as needed for shortness of breath / dyspnea or wheezing   Quantity:  1 Inhaler   Refills:  1       nicotine 14 MG/24HR 24 hr patch   Commonly known as:  NICODERM CQ   Used for:  Tobacco dependence syndrome   Started by:  Liza Rodriguez APRN CNP        Dose:  1 patch   Place 1 patch onto the skin every 24 hours   Quantity:  30 patch   Refills:  3         These medicines have changed or have updated prescriptions.        Dose/Directions    ibuprofen 800 MG tablet   Commonly known as:  ADVIL/MOTRIN   This may have changed:    - medication strength  - how much to take  - when to take this   Used for:  Chronic pain syndrome   Changed by:  Michael  LAURA Dougherty CNP        Dose:  800 mg   Take 1 tablet (800 mg) by mouth every 8 hours as needed for moderate pain   Quantity:  30 tablet   Refills:  1            Where to get your medicines      These medications were sent to [facility] [place], [place] - [address]  [address], [facility] [address]     Phone:  [phone]     albuterol 108 (90 Base) MCG/ACT inhaler    escitalopram 10 MG tablet    ibuprofen 800 MG tablet    nicotine 14 MG/24HR 24 hr patch                Primary Care Provider    None Specified       No primary provider on file.        Equal Access to Services     [facility]: Hadii aad ku hadasho Soomaali, waaxda luqadaha, qaybta kaalmada beltran, talia cristina . So [facility] [phone].    ATENCIÓN: Si habla español, tiene a lovelace disposición servicios gratuitos de asistencia lingüística. Llame al [phone].    We comply with applicable federal civil rights laws and [place] laws. We do not discriminate on the basis of race, color, national origin, age, disability, sex, sexual orientation, or gender identity.            Thank you!     Thank you for choosing [facility]  for your care. Our goal is always to provide you with excellent care. Hearing back from our patients is one way we can continue to improve our services. Please take a few minutes to complete the written survey that you may receive in the mail after your visit with us. Thank you!             Your Updated Medication List - Protect others around you: Learn how to safely use, store and throw away your medicines at www.disposemymeds.org.          This list is accurate as of 9/6/18  4:24 PM.  Always use your most recent med list.                   Brand Name Dispense Instructions for use Diagnosis    albuterol 108 (90 Base) MCG/ACT inhaler    PROAIR HFA/PROVENTIL HFA/VENTOLIN HFA    1 Inhaler    Inhale 2 puffs into the lungs every 6 hours  as needed for shortness of breath / dyspnea or wheezing    Mild intermittent asthma, uncomplicated       alum & mag hydroxide-simethicone 200-200-20 MG/5ML Susp suspension    MYLANTA/MAALOX     Take 30 mLs by mouth every 6 hours as needed for indigestion        escitalopram 10 MG tablet    LEXAPRO    30 tablet    Take 1 tablet (10 mg) by mouth daily    Major depressive disorder, recurrent episode, moderate (H)       guaiFENesin 20 mg/mL Soln solution    ROBITUSSIN     Take 10 mLs by mouth every 4 hours as needed for cough        ibuprofen 800 MG tablet    ADVIL/MOTRIN    30 tablet    Take 1 tablet (800 mg) by mouth every 8 hours as needed for moderate pain    Chronic pain syndrome       loratadine 10 MG tablet    CLARITIN     Take 10 mg by mouth daily as needed for allergies        MELATONIN PO      Take 3 mg by mouth nightly as needed        nicotine 14 MG/24HR 24 hr patch    NICODERM CQ    30 patch    Place 1 patch onto the skin every 24 hours    Tobacco dependence syndrome       phenol-menthol 14.5 MG lozenge      Place 1 lozenge inside cheek every 2 hours as needed for moderate pain        senna-docusate 8.6-50 MG per tablet    SENOKOT-S;PERICOLACE     Take 2 tablets by mouth daily as needed for constipation        TYLENOL PO      Take 650 mg by mouth every 4 hours as needed for mild pain or fever
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
## 2018-09-06 NOTE — Clinical Note
I referred him to our pain clinic, not sure if they will try to call him to schedule or not, can you make sure he follows up to make an appt.

## 2018-09-06 NOTE — PROGRESS NOTES
Visit Date:   09/06/2018      CHEMICAL DEPENDENCY ASSESSMENT      PATIENT'S ADDRESS:  [address] [place] [address], [address], [place] [id number].  TELEPHONE NUMBER:  [phone].   STATISTICS:  YOB: 1965.  Age:  53.  Marital Status:   and cohabiting.        DATE OF ASSESSMENT:  09/06/2018.      REASON FOR ASSESSMENT:  The patient states that in 07/2018 he used about 80 mg of methadone for pain control, which ended up being in an overdose.  His children found him.  Child Protection got involved.  He states it was a wakeup call.  He has been drinking excessively in the last year, went to [facility] and was referred here for chemical dependency treatment.        OUTPATIENT HEALTH ASSESSMENT:  On the date of assessment, blood pressure was 156/92, heart rate was 79, BMI was 31.28.  EDDIE was 0.000.  UA was positive for methadone, benzodiazepines and oxycodone.  He states a history of multiple medical issues.  He states he has had his right knee and right hip replaced.  He has chronic pain in his left knee.  He has had 2 cervical fusions.  He states he has had a history of some bulging disks.  He rated his pain level as a 4 of his lower back and neck.  His doctor is Dr. o[facility]reno at [facility].  He states he has been prescribed Lexapro since being diagnosed with depression in 07/2018, but he does not take it regularly.  He also states that he has been unable to get his prescription filled.      HISTORY OF PREVIOUS TREATMENT AND COUNSELING:  He estimates having had about 6 or 7 previous chemical dependency assessments.  He estimates about 5 or 6 chemical dependency treatments.  He remembers 1st treatment was at the age of 26 at [facility].  He completed and was clean for about 5 months.  At the age of 34, he went to [facility], clean for about 8 years.  [facility] in 11/2010 and clean for about 2 years.  [facility] 2012 and clean for about 3 years.   [place] outpatient in 2015 he did not complete.  It is questionable whether he was actually sober during those all the clean times that he reports.  He states he likes the groups.  He states he has been attending 12-step groups off and on since 26.  He states he likes groups, especially NA.  Last meeting was 1 to 2 years ago.      HISTORY OF ALCOHOL AND DRUG USE:  Current drug of choice is alcohol.  States he 1st drank alcohol at the age of 15, heaviest use was in his early 40s for about a year, drinking a quart to 1.75 liters of vodka daily.  States he was sober from [age].  In the last year, he has been drinking about a pint to a quart of vodka daily, drank 28 of the 30 last days, last use was 09/04/2018, 1 pint of vodka in the evening.      He 1st used pot at the age of 16, heaviest use was [age], smoking a joint daily.  In the last year, smokes once a month, last use was 2-3 weeks ago.      He 1st used cocaine at 20.  Heaviest use was [age], four or five times a week, a gram snorted, in the last year, heaviest use was from 02/2018 through 04/2018, snorting a gram a day, last use was 05/2018.      He 1st used meth at 23, at [age] used it, snorted about a 1/2-gram a few times a month, last use was at 24.      He 1st used heroin at 53.  Heaviest use was at 53 when he snorted it a few times, last use was 05/2018.      He states he has been on opiates in the past for his multiple surgeries.  States from [age], he was on Percocet daily 20-30 mg or more.  During that same time period, he was also on methadone.  From [age] was on methadone up to 110 mg a day.  Since then, he uses methadone sporadically for pain control.  Last time he used methadone was 09/05/2018, 10 mg.  States at 46, he was snorting oxycodone 25 mg pills daily for about 3 months, and then cleaned out to sporadic use.  In the last year, he used 1-2 times a week, 20-30 mg.  Last use of oxycodone was 09/05/2018, snorted.      He 1st used Klonopin  around 50.  States he uses it occasionally.  In the last year, he has used it 5 times, last use was 09/05/2018, 2.5 mg.      He 1st used hallucinogens at 18.  Heaviest use was from [age], using mushrooms and acid sporadically, last use was at 24.      He 1st used ecstasy at 20, from [age] used ecstasy sporadically.  Heaviest use was from [age], using 3 times a week, last use was at 24.      He 1st used tobacco at 17.  Heaviest use was in his 30s, smoking a pack a day.  He states from his 40s until now, about a 1/2-pack a day.      SUMMARY OF CHEMICAL DEPENDENCY SYMPTOMS ACKNOWLEDGED BY THE PATIENT:  He identifies all 11 of the DSM-5 criteria for diagnosis of substance dependence.      SUMMARY OF COLLATERAL DATA:  I did incorporate and reviewed the Rule 25 done at [facility] by Troy Grider at [phone].  That data was consistent with the client's.        MENTAL HEALTH STATUS:  On the date of assessment, he appeared his stated age.  He was well groomed, maintained eye contact at the examiner.  Speech rate regular.  Speech volume regular.  Speech quality fluid.  Perception reality based.  Memory intact.  Judgment intact.  Insight intact.  Oriented to time, place, person and situation.  Thoughts logical, evidenced no hallucinations.  General behavior tone was cooperative, evidenced no psychomotor problems.  Gait:  No problem.  Mood normal.  Affect congruent and appropriate.      VULNERABLE ADULT ASSESSMENT:  Mark is a categorical vulnerable adult according to [place] Statute 626-5572, subdivision 21.      IMPRESSION:   1.  Alcohol use disorder, severe, 303.90/F10.20.   2.  Cannabis use disorder, moderate, 304.30/F12.20.   3.  Opiate use disorder, severe, 304.00/F11.20.   4.  Cocaine use disorder, moderate, 304.20/F14.20.   5.  Tobacco use disorder, moderate, 305.10/F17.200.   6.  Depression, NOS, per patient's self-report.   7.  ADHD, per the patient's self-report.      [facility] PLACEMENT CRITERIA:    DIMENSION 1:  Intoxication and Withdrawal:  1.  At the time of his assessment, his UA was positive for benzos, methadone and oxy.  His EDDIE was 0.000.  His symptoms in the last 30 days, when at [facility] detox 2 weeks ago included sweating, shaky, unable to sleep, agitated, sad, vivid dreams, nausea, diarrhea, lack of appetite and unable to eat.  He identifies a history of about 7 previous formal detoxes.  The patient may be in mild withdrawal.  He reports drinking 1 pint or more of vodka daily, last use was on 09/04/2018.  On 09/05/2018, he snorted 10 mg of oxycodone, took 0.5 mg of Klonopin and 10 mg of methadone.  His BP was 156/92, his heart rate was 79.  I did staff him with Dr. Peterson who said he was okay to admit.  If the patient begins to display more withdrawal symptoms, he should be referred to ER.  He did not currently have any withdrawal symptoms that would prevent him from participation in substance abuse treatment.      DIMENSION 2:  Biomedical Conditions:  1.  He does endorse having the following medical conditions.  He has a right knee and right hip replaced, chronic pain in his left knee.  He has had cervical fusion in his back.  He rates his pain today in lower back and neck as a 4.  He is currently only prescribed Lexapro, which he is currently not taking, his only prescribed medications.  He does have MA and a PCP.  He does not have any medical condition that would prevent his participation in substance abuse treatment.  His chronic pain is often a trigger for relapse.  He sometimes self-medicates with street oxy, methadone or Klonopin.      DIMENSION 3:  Emotional/Behavioral:  2.  He reported being previously diagnosed with ADD in his 30s, depression in 07/2018.  He is currently not taking his prescribed Lexapro.  He is not seeing a therapist.  He denies a history of SI/SIB/HI/HA.  He states he had some suicidal ideation in 07/2018 with no plan and has had no thoughts since then.      The most  traumatic events in his life have included the death of his brother about 2 years ago, as they were best friends, and he still has not dealt with the grief, suicide death of a best friend when the patient was 20 and death of his father in 2014.      The patient's PHQ-9 was 12/27, including moderate depression.  His MAHENDRA-7 was a score of 5/21, indicating mild anxiety.      [place] 15, and then [place], [place].   He said he had a happy childhood.  His dad was a heavy drinker, but quit on his own.  He felt supported growing up about 60%.  He was the youngest of 4, so felt forgotten.  He was the youngest of 4 siblings, he has 2 brothers, one 57, the oldest overdosed in 2017, one sister 55.      His father, maternal grandfather, brother and sister had CD issues.  His mom had depression.  He denies a history of any emotional, physical, sexual abuse.  He states growing up was normal spankings.      DIMENSION 4:  Readiness for Change:  1.  He verbalizes motivation to abstain from all mood-altering substances, but has lacked the consistent behavior to maintain abstinence.  He is in the pre-contemplative stage of change.  He identifies his current motivation for change as 10 on a 10-point scale.  His primary motivation for treatment at this time appears to be some of self-motivation, some of his girlfriend and CPS is involved.  He was cooperative with the evaluation process and appeared motivated for sobriety and given recommendations.      DIMENSION 5:  Relapse Potential:  4.  He has continued to abuse mood-altering substances despite negative consequences in multiple life areas and 5 or 6 substance abuse treatment attempts.  He reports difficulty with impulse controls and lacks relapse prevention and recovery management skills to arrest substance abuse.  His inadequately treated psychiatric issues increase his risk of relapse, i.e., ADD, depression and has chronic pain.   He has a family history of addiction, i.e., his father, brother and sister.      DIMENSION 6:  Recovery Environment:  3.  He is unemployed and has excessive unstructured free time, which can increase his risk of relapse.  He was fired about 3 weeks ago for not showing up as a .  He lives with his girlfriend, who he said had been an active alcoholic.  She has been through 6 treatments and now has agreed to stop and go to meetings again.  He has some financial pressures and estimates that he has about $30,000 of debt.  He lacks chemically free leisure activities and interests.  He said his home life has been consumed with helping take care of three 7-year-old triplets.  He does not have a 's license because of unpaid tickets.  His girlfriend was recently laid off.  He states he has been a  for 27 years and loves his work.  He has no current legals.  He said Child Protection did open a case because his kids found him when he overdosed on methadone in 07/2018.  He said [facility] is not requiring that he do treatment.  He did not want to sign an UNIQUE for them.      RECOMMENDATIONS:   1.  That he abstain from alcohol and all illicit drugs and all mood-altering drugs unless prescribed by a health caregiver familiar with the patient's addiction.   2.  That he enter and complete [facility] or similar and follow counselor recommendations.   3.  That he arrange for sober supportive living upon discharge.  We will need to identify if his girlfriend is able to maintain sobriety in their home.   4.  Develop a sober support network.   5.  Continue to receive appropriate medical and psychiatric services as needed.   6.  Follow all the requirements of CPS.   7.  Continue to find alternative methods for pain management.      INITIAL PROBLEM LIST:   1.  He lacks relapse prevention and recovery management skills.   2.  He has difficulty managing his chronic pain.   3.  He appears  to have unresolved grief issues, especially regarding the death of his brother, which he says is an open wound.   4.  He has strained relationship with his girlfriend/significant other, who is the mother of his three 7-year-old triplets.  She also has been an active alcoholic and just allegedly stopped and plans to attend meetings.  He states that he is about $30,000 in debt.   5.  He is currently unemployed.   6.  He does not have a 's license.         This information has been disclosed to you from records protected by Federal confidentiality rules (42 CFR part 2). The Federal rules prohibit you from making any further disclosure of this information unless further disclosure is expressly permitted by the written consent of the person to whom it pertains or as otherwise permitted by 42 CFR part 2. A general authorization for the release of medical or other information is NOT sufficient for this purpose. The Federal rules restrict any use of the information to criminally investigate or prosecute any alcohol or drug abuse patient.      VIKTORIA MARCELO [facility] [place][note 2], LICSW             D: 2018   T: 2018   MT: ALEIDA      Name:     LIV MACKAY   MRN:      [id number]        Account:      [id number]   :      1965           Visit Date:   2018      Document: [id number]

## 2018-09-06 NOTE — PROGRESS NOTES
"[facility] Nursing Health Assessment      Vital signs:     BP (!) 150/98 (BP Location: Right arm)  Pulse 78  Temp 97.8  F (36.6  C)  Ht 5' 10\" (1.778 m)  Wt 218 lb (98.9 kg)  BMI 31.28 kg/m2      Direct admission    Counselor: Alma  Drug of Choice: Alcohol, Opiates  Last use: 9/5/2018  Home clinic/MD: [facility] - Dr. John.      Psychiatrist/therapist: Have seen therapist and Psychiatrist in the past.      Medical history/current conditions:  Asthma (pt does not have rescue inhaler); 4 bulging discs in lower    H&P Screen:  H&P within the last 90 days: Yes.  Date: 9/6/2018 Location: [facility] sayda De Jesus at 4 pm      Mental Health diagnosis: Depression - pt taking Lexapro.  Stopped for awhile while in active addiction.  Will restart while at [facility]  Medication compliant?: pt is compliant taking lexapro when he is sober  Recent sucidal thoughts? no     When? na  Current thought of self-harm? no    Plan? na    Pain assessment:   Pt. Experiencing pain at this time?  Yes.  Rating on 0-10 scale: (1-10 scale): 5.  Location: 4 bulging discs in back  Chronic  Result of: car falling on pt while working on it.       Nursing Assessment Summary:  [facility] RN obtained UNIQUE for [facility], Monique Pathak (Addiction Medicine/Pain Provider) and faxed Rule 25, UNIQUE and request for pt to be set up with appt.  Will call [facility] RN with appt date and time  On-going nursing intervention required?   No    Acute care visit recommended: no       "

## 2018-09-06 NOTE — PROGRESS NOTES
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 Virus, stroke, seizure, heart attack, near drowning or near suffocation)?  No    40. Have you ever been diagnosed with Fetal Alcohol Effects or Fetal Alcohol Syndrome?  No    41. What are your some of your personal strengths?  social, good sense of humor, likeable derrick    Dimension III Ratings Summary   Emotional/Behavioral/Cognitive - The placing authority must use the criteria in Dimension III to determine a client s emotional, behavioral, and cognitive conditions and complications.   RISK DESCRIPTIONS - Severity ratin Client has difficulty with impulse control and lacks coping skills. Client has thoughts of suicide or harm to others without means; however, the thoughts may interfere with participation in some treatment activities. Client has difficulty functioning in significant life areas. Client has moderate symptoms of emotional, behavioral, or cognitive problems. Client is able to participate in most treatment activities.    REASONS SEVERITY WAS ASSIGNED - What current issues might with thinking, feelings or behavior pose barriers to participation in a treatment program? What coping skills or other assets does the person have to offset those issues? Are these problems that can be initially accommodated by a  treatment provider? If not, what specialized skills or attributes must a provider have?    He reported being previously being diagnosed with ADD in his 30's, Depression in 7/18. He is currently not taking his rx'ed Lexapro. He is not seeing a therapist. He denies a history of SA/SIB/HI/HA. He states he had some SI in 7/18 with no plan and has had no thoughts since then.      The most traumatic events in his life have included:Death of brother about 2 years ago (they were best friends he still hasn't dealt with it), suicide death of best friend when pt was 20, death of father 2014.    The patient's PHQ-9 score was 12 out of 27, indicating moderate depression.  The patient's MAHENDRA-7 score was 5 out of 21, indicating mild anxiety    [place] 15, then [facility]. He said he had a happy childhood. His dad was a heavy drinker but quit on his own.   He felt supported growing up by: 60%, He was the youngest of 4 so felt forgotten.   Siblings: He was the youngest of 4 siblings, he had 2 brothers (one 57, the oldest overdosed in 2017) 1 sister (55).   Family CD hx: father paternal grandfather, brother, sister.   Family MI hx: mom had depression  Emotional Abuse: none  Physical Abuse: none  Sexual abuse: none  Discipline growing up was: normal, spankings.        DIMENSION IV - Readiness to Change     1. What is your motivation for participating in this evaluation today?    10/10    2. What problematic behaviors have you engaged in when using alcohol or other drugs that could be hazardous to you or others (i.e. driving a car/motorcycle/boat, operating machinery, unsafe sex, IV drug use, sharing needles, etc.)  (DSM-5: Sx #8)    Driving, unsafe sex, being around unsafe people, being in unsafe neighborhoods.    3. If applicable, when did you first think you had a problem with your alcohol or other drug use?    In early 20's he knew he had a problem he was using coke daily, told  himself every AM he would quit but couldn't.    4. Who in your life has shared concerns with you about your use of alcohol or other drugs?    His GF and sister have been concerned.     5. Are there any changes you have made or plan to make regarding how you had been using alcohol or other drugs?    Yes, explain: He set up an eval and referral to tx.     Dimension IV Ratings Summary   Readiness for Change - The placing authority must use the criteria in Dimension IV to determine a client s readiness for change.   RISK DESCRIPTIONS - Severity ratin Client is motivated with active reinforcement, to explore treatment and strategies for change, but ambivalent about illness or need for change.    REASONS SEVERITY WAS ASSIGNED - (What information did the person provide that supports your assessment of his or her readiness to change? How aware is the person of problems caused by continued use? How willing is she or he to make changes? What does the person feel would be helpful? What has the person been able to do without help?)      He verbalizes motivation to abstain from all mood altering substances but has lacked the consistent behavior to maintain abstinence. He is in the pre-contemplative stage of change. He identifies his current motivation for change as a 10 on a 10 point scale. His primary motivation for treatment at this time appears to be some of self motivation, some from his GF and CPS is involved. He was cooperative with the evaluation process and appeared motivated for sobriety and given recommendations.       DIMENSION V - Relapse, Continued Use and Continued Problem Potential     1. If you have had previous periods of sobriety, when was your longest period of sobriety and what were you doing at that time that was supporting your sobriety?  (DSM-5: Sx #2)    32 - 42 after tx, followed by weekly NA and a had a sponsor. His wife was an active alcoholic that entire time.    2. Within the past 30 days, on a  scale from 0-10 (0 = having no cravings at all and 10 = having very strong cravings to use alcohol or other drugs) what number would you assign to your cravings? (DSM-5: Sx #4)     10    3. Can you identify any specific reasons or specific triggers that contribute to you being more likely to consume alcohol or other drugs? (DSM-5: Sx #4)    Yes, explain: chronic pain, stress, 7 year old triplets, using friends, GF using,    4. Have you been treated for alcohol/other substance use disorder? (DSM-5: Sx #2)  Yes  4B. Number of times(lifetime) (over what period) 5.   4C. Number of times completed treatment (lifetime) 4.   4D. During the past three years have you participated in outpatient and/or residential?  Yes    Age 26 [place] clean 5 months  [address] [facility] 34  Clean for 8 years  LP [facility] 11/20/10 clean for 2 years  [facility] 2012 clean for 3 years  [facility] 2015 did not complete    He liked the groups.     5. Support group participation: Have you/do you attend 12-step or other support group meetings? How recently? What was your experience? Are you willing to restart? If the person has not participated, is he or she willing?  (DSM-5: Sx #2)    He has attended 12 step groups off and on since 26, he like 12 step groups. Last meeting was 1-2 years ago. He like NA best.    6. Do you drink alcohol or use other drugs in larger amounts than intended or over a longer period of time than was intended?  (DSM-5: Sx #1)    Yes, explain: at times will drink daily 1 pint to at one point in his life 1.75 liters of vodka a day.     7. Do you spend a great deal of time engaged in activities necessary to obtain alcohol or other drugs, a great deal of time using alcohol or other drugs, or a great deal of time recovering from alcohol or other drug use?  (DSM-5: Sx #3)    Yes, explain: recently he often uses daily, all day long.     Dimension V Ratings Summary   Relapse/Continued Use/Continued problem potential - The placing  authority must use the criteria in Dimension V to determine a client s relapse, continued use, and continued problem potential.   RISK DESCRIPTIONS - Severity ratin No awareness of the negative impact of mental health problems or substance abuse. No coping skills to arrest mental health or addiction illnesses, or prevent relapse.    REASONS SEVERITY WAS ASSIGNED - (What information did the person provide that indicates his or her understanding of relapse issues? What about the person s experience indicates how prone he or she is to relapse? What coping skills does the person have that decrease relapse potential?)      He has continued to abuse mood altering substances despite negative consequences in multiple life areas and 5 or 6 Substance Abuse treatment attempts.He reports difficulty with impulse controls and lacks relapse prevention, recovery management skills to arrest substance abuse. His inadequately treated psychiatric issues increase his risk of relapse I.e. ADD, depression and his chronic pain.  He has a family history of addiction i.e. Father, brother, sister.        DIMENSION VI - Recovery Environment     1. Are you employed or attending school?    Unemployed for 3 weeks. 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     2. If working or a student, are you able to function appropriately in that setting?     Yes    3. Has your job and/or school work been negatively impacted by your use of alcohol of other drugs?  (DSM-5: Sx #5 & Sx #7)    Yes, explain: Recently fired    4. How would you describe your current finances?  Some money problems     5. Are you having financial problems, such as money being tight, living paycheck to paycheck, having unpaid or late bills, having significant debt, a history of bankruptcy, or IRS problems?    Yes, explain: probably around $30k     6. Describe a typical day; evening for you. Work, school, social, leisure  activities, volunteer, exercise, spiritual practices or other daily tasks.    Since not working, get up, have a drink, clean up house, take care of kids. Nothing else. Family life is all consuming    7. Have you reduced or discontinued recreational activities, hobbies or other leisure activities as a result of your use of alcohol or other drugs?  (DSM-5: Sx #7)    Yes, explain: he used to inline roller blading marathons, golf, etc.     8. Who do you live with?      GF (together 8 years) and 7 year old tripplets, She was a  asst., she is also unemployed.     9. Are there any people in the home who have current substance abuse issues or have mental health issues?     Yes, explain: She has been through tx 6x's, she recently just quit on her own and plans to go to meetings.     10. Tell me about your living environment/neighborhood? Ask enough follow up questions to determine safety, criminal activity, availability of alcohol and drugs, supportive or antagonistic to the person making changes.      Safe, no drugs avail.     11. Are you concerned for your safety or anyone else's safety in the home? No    12. Do you have plans to move somewhere else or change your living environment in any manner?    No    13. Do you have children who live with you?     Yes.  (Ask follow-up questions to determine the person s relationship and responsibility, both legal and care giving; and what arrangements are made for supervision for the children when the person is not available.) 7 year old triplets (2 dtrs. And a son).    14. Do you have children who do not live with you?     Yes.  (Ask follow up questions to learn where the children are, who has custody and what the person s relationship and responsibility is with these children and what hopes the person has for his or her future with these children.) 2 sons, 26 and 23.    15. Do you have any history of being involved with Child Protection Services?  Yes, explain: the  are currently involved now, the kids found him when he overdosed. They opened a case.      16. Are you currently in a significant relationship?     Yes.  How long have you been in the relationship?  8 years to same GF, they haven't had time or money to get .     17. How do you identify your sexual orientation?    Heterosexual    18. The patient reported: being single, in a serious relationship with same GF for the last 8 years. They haven't had the time or money to get . He was previously   from 1991 - 2011 which ended because of his wife's drinking.     19. Does your significant other have a history of substance abuse or have current substance abuse issues?    Yes, explain: Yes she is alcoholic, has been through 6 tx's. She has now agreed to stop and plans to resume attending meetings.     20. How important is substance use to your social connections? Do many of your family or friends use?     Some of his friends a social users, several straight friens.     21. Who in your life would you consider to be your primary support network at this time?    GF, sister    22. Have any of your relationships (S.O., family members, friends, employers, teachers, etc.) been negatively impacted by your use of alcohol or other drugs?  (DSM-5: Sx #6)    Yes, explain: They were all worried when he had a recent accidental right eye, they are concerned about his health.     23. Do you currently participate in community ana lilia activities, such as attending Rastafarian, temple, Presybeterian or Anabaptism services?  No    24. Criminal justice history: Gather current/recent history and any significant history related to substance use--Arrests? Convictions? Circumstances? Alcohol or drug involvement? Sentences? Still on probation or parole? Expectations of the court? Current court order?  (DSM-5: Sx #8)    3 DWI's, 88, 89, 2011.  Possession 5th degree, 2012,  Nothing current.     25. Are you or have you ever been a registered sex  offender?  No    26. Do you have a child protection worker,  or ?  Yes, explain: Rosa and Jerrica Solomon  He did not want to sign an UNIQUE for CPS as he said they are not requiring him to be here.     27. Are you currently on any type of commitment?  No, I'm not on any type of commitment at this time.    28. Do you have a valid 's license?  No, explain: suspended re: unpaid tickets.     29. What obstacles exist to participating in treatment? (Time off work, childcare, funding, transportation, pending correction time, living situation)     None    Dimension VI Ratings Summary   Recovery environment - The placing authority must use the criteria in Dimension VI to determine a client s recovery environment.   RISK DESCRIPTIONS - Severity rating: 3 Client is not engaged in structured, meaningful activity and the client s peers, family, significant other, and living environment are unsupportive, or there is significant criminal justice system involvement.    REASONS SEVERITY WAS ASSIGNED - (What support does the person have for making changes? What structure/stability does the person have in his or her daily life that will increase the likelihood that changes can be sustained? What problems exist in the person s environment that will jeopardize getting/staying clean and sober?)     He is unemployed and has excessive unstructured free time which can increase his risk of relapse. He lives with his GF who he said has been an active alcoholic, she has been through 6 tx's and now agreed to stop and go to meetings again. He has financial pressures and estimates that he has about $30k of debt. He lacks healthy chemically free leisure activities and interests. He said his home life has been consumed with helping to take care of 3  7 year old triplets. He does not have a DL because of unpaid tickets. His GF was recently laid off. He was laid off about 3 weeks ago for missing work when using. He has  been a  for 27 years and loves the work. He has not current legals. He said CPS opened a case because his kids found him when he overdosed on methadone in July 2018. He said CPS is not requiring that he do tx and didn't want to sign an UNIQUE for them.        Mental Health Status   Physical Appearance/Attire: Appears stated age   Hygiene: well groomed   Eye Contact: at examiner   Speech Rate:  regular   Speech Volume: regular   Speech Quality: fluid   Cognitive/Perceptual:  reality based   Cognition: memory intact    Judgment: intact   Insight: intact   Orientation:  time, place, person and situation   Thought:   logical    Hallucinations:  none   General Behavioral Tone: cooperative   Psychomotor Activity: no problem noted   Gait:  no problem   Mood: normal   Affect: congruence/appropriate   Counselor Notes: NA     Patient Choices/Exceptions     Would you like services specific to language, age, gender, culture, Evangelical preference, race, ethnicity, sexual orientation or disability?  Yes - LP at [facility]    What particular treatment choices and options would you like to have? See above.    Do you have a preference for a particular treatment program?  See above.     Patient is willing to follow treatment recommendations.  Yes    DSM-5 Criteria for Substance Use Disorder   Criteria for Diagnosis  Instructions: Determine whether the client currently meets the criteria for Substance Use Disorder using the diagnostic criteria in the DSM-5 pp.[id number]. Current means during the most recent 12 months outside a facility that controls access to substances.    A problematic pattern of alcohol/drug use leading to clinically significant impairment or distress, as manifested by at least two of the following, occurring within a 12-month period:    1. Alcohol/drug is often taken in larger amounts or over a longer period than was intended.  2. There is a persistent desire or unsuccessful efforts to cut down or control  alcohol/drug use  3. A great deal of time is spent in activities necessary to obtain alcohol/drug, use alcohol/drug, or recover from its effects.  4. Craving, or a strong desire or urge to use alcohol/drug  5. Recurrent alcohol/drug use resulting in a failure to fulfill major role obligations at work, school or home.  6. Continued alcohol use despite having persistent or recurrent social or interpersonal problems caused or exacerbated by the effects of alcohol/drug.  7. Important social, occupational, or recreational activities are given up or reduced because of alcohol/drug use.  8. Recurrent alcohol/drug use in situations in which it is physically hazardous.  9. Alcohol/drug use is continued despite knowledge of having a persistent or recurrent physical or psychological problem that is likely to have been caused or exacerbated by alcohol.  10. Tolerance, as defined by either of the following: A need for markedly increased amounts of alcohol/drug to achieve intoxication or desired effect.  11. Withdrawal, as manifested by either of the following: The characteristic withdrawal syndrome for alcohol/drug (refer to Criteria A and B of the criteria set for alcohol/drug withdrawal).          Specify if: In early remission:  After full criteria for alcohol/drug use disorder were previously met, none of the criteria for alcohol/drug use disorder have been met for at least 3 months but for less than 12 months (with the exception that Criterion A4,  Craving or a strong desire or urge to use alcohol/drug  may be met).     In sustained remission:   After full criteria for alcohol use disorder were previously met, non of the criteria for alcohol/drug use disorder have been met at any time during a period of 12 months or longer (with the exception that Criterion A4,  Craving or strong desire or urge to use alcohol/drug  may be met).   Specify if:   This additional specifier is used if the individual is in an environment where  access to alcohol is restricted.    Mild: Presence of 2-3 symptoms  Moderate: Presence of 4-5 symptoms  Severe: Presence of 6 or more symptoms    DSM-5 Substance Use Disorder Diagnosis     Alcohol Use Disorder Severe - 303.90 (F10.20)  Cannabis Use Disorder Moderate - 304.30 (F12.20)  Opioid Use Disorder Severe - 304.00 (F11.20)  Cocaine Use Disorder Moderate - 304.20 (F14.20)  Tobacco Use Disorder Moderate - 305.10 (F17.200)  Depression NOS, per patient self-report  ADHD, per patient self-report    Collateral Contact Summary     Number of contacts made:  I reviewed the rule 25 from restoration counseling.     Contact with referring person:  See above.    If court related records were reviewed, summarize here:  NA    Information from collateral contacts supported/largely agreed with information from the client and associated risk ratings.    Collateral Contact      Name: Relationship: Phone number: Releases:     I reviewed the rule 25 from restoration counseling    Recommendations     1) Abstain from alcohol, all illicit drugs and all mood altering drugs unless prescribed by a healthcare giver familiar with the patient's addiction.    2) Enter and complete LP at [facility] or similar and follow counselor recommendations.   3) Arrange for sober supportive living upon discharge. He will need to identify if his GF is able to maintain sobriety in their home.   4) Develop a sober supportive network.  5) Continue to receive appropriate medical and psychiatric services as needed.  6) Follow all the requirements of CPS.  7) Continue to find alternative methods for pain management.      Level of Care   I.) Intoxication and Withdrawal: 1   II.) Biomedical:  1   III.) Emotional and Behavioral:  2   IV.) Readiness to Change:  1   V.) Relapse Potential: 4   VI.) Recovery Environmental: 3     Initial Problem List     The patient is currently living in an unhealthy and/or using environment  The patient lacks  relapse prevention skills  The patient has poor coping skills  The patient has poor refusal skills   The patient lacks a sober peer support network  The patient has dual issues of MI and CD  The patient lacks the ability to effectively manage his/her mental health issues  The patient has a significant history of trauma and/or abuse issues  The patient has a significant history of grief and loss issues  The patient has current child protection and/or child custody issues

## 2018-09-06 NOTE — PROGRESS NOTES
Initial Services Plan        Service Initiation Date: 9/6/2018    Immediate health and/or safety concerns: No    Identify health and safety concern(s) below and include plan to address:    None Identified    Client issues to be addressed in the first treatment sessions:     Other: He need to identify how to manage his relapse triggers i.e. chronic pain, stress of 3  7 year old triplets, unresolved grief from brother's death.    Treatment suggestions for client during the time between intake (admit date) and completion of the individual treatment plan:     Look for a sober support network, i.e. 12 step, [facility], [facility], etc  Tour the treatment center or outpatient clinic  Review your patient or client handbook      Completed by: LAKEISHA Loza  Date completed: 9/6/2018 at 4:18 PM

## 2018-09-07 ENCOUNTER — HOSPITAL ENCOUNTER (OUTPATIENT)
Dept: BEHAVIORAL HEALTH | Facility: CLINIC | Age: 53
End: 2018-09-07
Attending: FAMILY MEDICINE
Payer: COMMERCIAL

## 2018-09-07 PROCEDURE — 10020000 ZZH LODGING PLUS FACILITY CHARGE ADULT

## 2018-09-07 PROCEDURE — H2035 A/D TX PROGRAM, PER HOUR: HCPCS

## 2018-09-07 PROCEDURE — H2035 A/D TX PROGRAM, PER HOUR: HCPCS | Mod: HQ

## 2018-09-07 ASSESSMENT — PATIENT HEALTH QUESTIONNAIRE - PHQ9: SUM OF ALL RESPONSES TO PHQ QUESTIONS 1-9: 12

## 2018-09-07 ASSESSMENT — ANXIETY QUESTIONNAIRES: GAD7 TOTAL SCORE: 5

## 2018-09-07 NOTE — PROGRESS NOTES
Patient:  Mark Berumen    Date: September 7, 2018    Comprehensive Assessment UPDATE        Comprehensive Summary Update and Review  Counselor met with patient on 9/7/18 and reviewed the Comprehensive Assessment.    The following updates have been made: See GI[facility]NI document for all changes and updates.
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
## 2018-09-08 ENCOUNTER — HOSPITAL ENCOUNTER (OUTPATIENT)
Dept: BEHAVIORAL HEALTH | Facility: CLINIC | Age: 53
End: 2018-09-08
Attending: FAMILY MEDICINE
Payer: COMMERCIAL

## 2018-09-08 PROCEDURE — 10020000 ZZH LODGING PLUS FACILITY CHARGE ADULT

## 2018-09-08 PROCEDURE — H2035 A/D TX PROGRAM, PER HOUR: HCPCS

## 2018-09-09 ENCOUNTER — HOSPITAL ENCOUNTER (OUTPATIENT)
Dept: BEHAVIORAL HEALTH | Facility: CLINIC | Age: 53
End: 2018-09-09
Attending: FAMILY MEDICINE
Payer: COMMERCIAL

## 2018-09-09 PROCEDURE — H2035 A/D TX PROGRAM, PER HOUR: HCPCS | Mod: HQ

## 2018-09-09 PROCEDURE — 10020000 ZZH LODGING PLUS FACILITY CHARGE ADULT

## 2018-09-10 ENCOUNTER — HOSPITAL ENCOUNTER (OUTPATIENT)
Dept: BEHAVIORAL HEALTH | Facility: CLINIC | Age: 53
End: 2018-09-10
Attending: FAMILY MEDICINE
Payer: COMMERCIAL

## 2018-09-10 PROCEDURE — 10020000 ZZH LODGING PLUS FACILITY CHARGE ADULT

## 2018-09-10 PROCEDURE — H2035 A/D TX PROGRAM, PER HOUR: HCPCS | Mod: HQ

## 2018-09-10 PROCEDURE — H2035 A/D TX PROGRAM, PER HOUR: HCPCS

## 2018-09-11 ENCOUNTER — HOSPITAL ENCOUNTER (OUTPATIENT)
Dept: BEHAVIORAL HEALTH | Facility: CLINIC | Age: 53
End: 2018-09-11
Attending: FAMILY MEDICINE
Payer: COMMERCIAL

## 2018-09-11 PROCEDURE — H2035 A/D TX PROGRAM, PER HOUR: HCPCS | Mod: HQ

## 2018-09-11 PROCEDURE — 10020000 ZZH LODGING PLUS FACILITY CHARGE ADULT

## 2018-09-11 PROCEDURE — H2035 A/D TX PROGRAM, PER HOUR: HCPCS

## 2018-09-11 NOTE — PROGRESS NOTES
Acknowledgement of Current Treatment Plan - Initial Treatment Plan     INITIAL TREATMENT PLAN:     1. I have participated in creating my treatment plan with my therapist / counselor on 9/11/18.     I agree with the plan as it is written in the electronic health record.    Name Signature/Date   Patient     Name of Therapist / Counselor Signature/Date   Counselor/Therapist        2. I have completed and reviewed my Safety Plan with my counselor and signed this on _________. I have been given the hard copy of this plan.    Patient signature/date:      _____________________________________________________________________________    3. Last Use Date: __________    Patient signature/date:     _____________________________________________________________________________                                                    Acknowledgement of Current Treatment Plan - Additional Entries       ADDITIONAL GOALS AND INTERVENTIONS:    Signatures/dates required for any additional Problems, Goals, and/or Interventions added to treatment plan:  Change/Addition in Dimension ____ on date:_________  Insert here:         I have participated in creating this change and/or addition to my treatment plan copied above.   I have been given a copy of this signature page with change/addition to my treatment plan and I am in agreement with how it is written in the electronic record.       Patient signature:       ________________________________________________________________________      Counselor/Therapist signature:    ________________________________________________________________              Change in date of last use:       ________________________________________________________________        Patient signature/date (required for change in last use date):     ________________________________________________________________

## 2018-09-12 ENCOUNTER — HOSPITAL ENCOUNTER (OUTPATIENT)
Dept: BEHAVIORAL HEALTH | Facility: CLINIC | Age: 53
End: 2018-09-12
Attending: FAMILY MEDICINE
Payer: COMMERCIAL

## 2018-09-12 PROCEDURE — 10020000 ZZH LODGING PLUS FACILITY CHARGE ADULT

## 2018-09-12 PROCEDURE — H2035 A/D TX PROGRAM, PER HOUR: HCPCS

## 2018-09-12 PROCEDURE — H2035 A/D TX PROGRAM, PER HOUR: HCPCS | Mod: HQ

## 2018-09-12 NOTE — PROGRESS NOTES
Patient:  Mark Berumen            Adult CD Progress Note and Treatment Plan Review     Attendance  Please refer to OP BEH CD Adult Attendance Record Documentation Flowsheet    Support group attended this week: yes    Reporting sobriety:  yes    Treatment Plan     Treatment Plan Review competed on: 9-12-18       Client preferred learning style: Visual  Hands on  Verbal    Staff Members contributing LAKEISHA Davison LADC, LAKEISHA Elise                     Received Supervision: No    Client: contributed to goals and plan.    Client received copy of plan/revised plan: Yes    Client agrees with plan/revised plan: Yes        Changes to Treatment Plan: No    New Goals added since last review None needed.    Goals worked on since last review Acceptance of the first step.Attending 12 step group and creating a sober support system.    Strategies effective: yes    Strategies need these changes: None needed.    1) Care Coordination Activities:  None needed.Pt. Will work with staff  to secure sober housing and treatment.  2) Medical, Mental Health and other appointments the client attended: None reported.  3) Medication issues: Pt.is staying med compliant.  4) Physical and mental health problems: Pt.reports he has none.  5) Any changes in Vulnerable Adult Status?  No If yes, add to treatment plan and individual abuse prevention plan.  6) Review and evaluation of the individual abuse prevention plan: Current IAPP for this program is adequate for this client          ASAM Risk Rating:    Dimension 1 0 No changes.    Dimension 2 1 No changes.    Dimension 3 2 Pt.reports he has no suicidal ideation.Pt.is working on his depression and addiction assignment.Pt.will be meeting with the staff pychotherapist.Pt.lacks awareness of how his addiction has affected his mental health.    Dimension 4 1 Pt.has been attending all lectures and group sessions.Pt.has become an active group participant.Pt.needs to  "increase his internal motivation to change.Pt.is working on his first sep assignment.    Dimension 5 4 Pt.lacks insight into his relapse process.Pt.needs to identify his relapse warning signs and triggers in the areas of people,places,activities and feelings.Pt.is reading \"Staying Sober\"Pt.seems willing to look at life style changes to maintain sobriety.      Dimension 6 3 Pt.is attending 5 12 step meetings per week while in treatment.Pt.needs to obtain a sponsor.Pt.has damaged the relationship with his children.      Guide to Risk Ratings for Suicidality:   IDEATION: Active thoughts of suicide? INTENT: Intent to follow on suicide? PLAN: Plan to follow through on suicide? Level of Risk:   IF Yes Yes Yes Patient = High Emergent   IF Yes Yes No Patient = High Urgent/Non-Emergent   IF Yes No No Patient = Moderate Non-Urgent   IF No No   No Patient = Low Risk   The patient's ADDITIONAL RISK FACTORS and lack of PROTECTIVE FACTORS may increase their overall suicide risk ratings.     Patient's/client's current risk rating:  Low Risk    Family Involvement:   UNIQUE signed    Data:   offered feedback good insight client did actively participate      Intervention:   Aftercare planning  Behavior modification  Counselor feedback  Education  Group feedback  Relapse prevention  Twelve Step facilitation      Assessment:   Stages of Change Model  Preparation/Determination    Appears/Sounds:  Cooperative  Motivated  Engaged      Plan:  Continue group therapy.      LAKEISHA Rojas        "

## 2018-09-13 ENCOUNTER — HOSPITAL ENCOUNTER (OUTPATIENT)
Dept: BEHAVIORAL HEALTH | Facility: CLINIC | Age: 53
End: 2018-09-13
Attending: FAMILY MEDICINE
Payer: COMMERCIAL

## 2018-09-13 PROCEDURE — H2035 A/D TX PROGRAM, PER HOUR: HCPCS

## 2018-09-13 PROCEDURE — H2035 A/D TX PROGRAM, PER HOUR: HCPCS | Mod: HQ

## 2018-09-13 PROCEDURE — 10020000 ZZH LODGING PLUS FACILITY CHARGE ADULT

## 2018-09-13 NOTE — PROGRESS NOTES
AFTERCARE NOTE  9/13/18    Writer met with patient today to discuss his aftercare plans. Pt stated he is interested in the [facility] in [place], [place]. I gave pt information on the various programs at [facility] and asked him to review them and discuss with his primary counselors what they feel would be the best program for him. Updates to follow.    Fox Johnson/ [facility]   [facility]
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
## 2018-09-14 ENCOUNTER — HOSPITAL ENCOUNTER (OUTPATIENT)
Dept: BEHAVIORAL HEALTH | Facility: CLINIC | Age: 53
End: 2018-09-14
Attending: FAMILY MEDICINE
Payer: COMMERCIAL

## 2018-09-14 PROCEDURE — H2035 A/D TX PROGRAM, PER HOUR: HCPCS | Mod: HQ

## 2018-09-14 PROCEDURE — H2035 A/D TX PROGRAM, PER HOUR: HCPCS

## 2018-09-14 PROCEDURE — 10020000 ZZH LODGING PLUS FACILITY CHARGE ADULT

## 2018-09-15 ENCOUNTER — HOSPITAL ENCOUNTER (OUTPATIENT)
Dept: BEHAVIORAL HEALTH | Facility: CLINIC | Age: 53
End: 2018-09-15
Attending: FAMILY MEDICINE
Payer: COMMERCIAL

## 2018-09-15 PROCEDURE — 10020000 ZZH LODGING PLUS FACILITY CHARGE ADULT

## 2018-09-15 PROCEDURE — H2035 A/D TX PROGRAM, PER HOUR: HCPCS

## 2018-09-16 ENCOUNTER — HOSPITAL ENCOUNTER (OUTPATIENT)
Dept: BEHAVIORAL HEALTH | Facility: CLINIC | Age: 53
End: 2018-09-16
Attending: FAMILY MEDICINE
Payer: COMMERCIAL

## 2018-09-16 PROCEDURE — H2035 A/D TX PROGRAM, PER HOUR: HCPCS | Mod: HQ

## 2018-09-16 PROCEDURE — 10020000 ZZH LODGING PLUS FACILITY CHARGE ADULT

## 2018-09-17 ENCOUNTER — HOSPITAL ENCOUNTER (OUTPATIENT)
Dept: BEHAVIORAL HEALTH | Facility: CLINIC | Age: 53
End: 2018-09-17
Attending: FAMILY MEDICINE
Payer: COMMERCIAL

## 2018-09-17 PROCEDURE — H2035 A/D TX PROGRAM, PER HOUR: HCPCS | Mod: HQ

## 2018-09-17 PROCEDURE — 10020000 ZZH LODGING PLUS FACILITY CHARGE ADULT

## 2018-09-17 PROCEDURE — H2035 A/D TX PROGRAM, PER HOUR: HCPCS

## 2018-09-17 NOTE — TELEPHONE ENCOUNTER
A phone call was made as follow-up to the Family Program mailing for the week of 09/24/2018. Message left or spoke in person to individuals on UNIQUE.

## 2018-09-17 NOTE — PROGRESS NOTES
Patient:  Mark Berumen            Adult CD Progress Note and Treatment Plan Review     Attendance  Please refer to OP BEH CD Adult Attendance Record Documentation Flowsheet    Support group attended this week: yes    Reporting sobriety:  yes    Treatment Plan     Treatment Plan Review competed on: 9/17/18       Client preferred learning style: Hands on  Verbal  Demonstration    Staff Members contributing Vaishnavi Esparza, Intern [facility]; Martell Medellin [facility]; Sebas Rivera [facility]; Veronica Browning [facility]                     Received Supervision: Yes    Client: contributed to goals and plan.    Client received copy of plan/revised plan: Yes    Client agrees with plan/revised plan: Yes        Changes to Treatment Plan: No    New Goals added since last review None.     Goals worked on since last review Spiritual Care, Relapse Prevention, Identifying personal warning signs and triggers, building a sober network.     Strategies effective: yes    Strategies need these changes: No changes needed at this time.     1) Care Coordination Activities:  Pt met with program  on 9/13/18 and discussed after care programming for when he is done with LP tx.  Pt will discuss information with primary care counselors and meet with LP program  again.    2) Medical, Mental Health and other appointments the client attended: Pt met with  staff psychotherapist on 9/13/18.  Pt has next session on 9/19/18.    3) Medication issues: Pt is med compliant.   4) Physical and mental health problems: Pt reports chronic pain with knee and back but is managing his pain an able to participate in tx without interference.    5) Any changes in Vulnerable Adult Status?  No If yes, add to treatment plan and individual abuse prevention plan.  6) Review and evaluation of the individual abuse prevention plan: Current IAPP for this program is adequate for this client          ASAM Risk Rating:    Dimension 1 - 0 - No changes.     Dimension 2  "- 1 - No changes.     Dimension 3  - 2 - Pt denies and SI/SIB/SA.  Pt participated in a suicide rating risk at time of cd assessment and pt was rated at a low/no risk.  Pt is activity meeting with staff psychotherapist to address issues and will continue seeing the psychotherapist 1X/week while in LP.      Dimension 4  - 1 - Pt completed First Step assignment.  Pt is working on increasing internal motivation to change and is working on drug use history.  Pt is working on increasing spiritual awareness and how it relates to recovery.  Pt will complete \"Little [employer]w Book on Spirituality\" assignment and share with his group.      Dimension 5 - 4 - Pt is working on \"Staying Sober\" assignment and will share a 2pg reflection paper with his group. Pt is working on identifying personal triggers and cues and will share \"Putting it All Together\" packet and share with his group.       Dimension 6 - 3 - Pt is working on developing a sober network by attending 5+ recover mtgs/week while in LP.  Pt has met with program  to discuss after care options and will meet with  again to confirm.        Guide to Risk Ratings for Suicidality:   IDEATION: Active thoughts of suicide? INTENT: Intent to follow on suicide? PLAN: Plan to follow through on suicide? Level of Risk:   IF Yes Yes Yes Patient = High Emergent   IF Yes Yes No Patient = High Urgent/Non-Emergent   IF Yes No No Patient = Moderate Non-Urgent   IF No No   No Patient = Low Risk   The patient's ADDITIONAL RISK FACTORS and lack of PROTECTIVE FACTORS may increase their overall suicide risk ratings.     Patient's/client's current risk rating:  Low Risk    Family Involvement:   UNIUQE signed    Data:   offered feedback good insight client did actively participate      Intervention:   Aftercare planning  Behavior modification  Cognitive Behavioral Therapy  Counselor feedback  Education  Emotional management  Group feedback  Relapse prevention  Twelve Step " facilitation  Mental health education      Assessment:   Stages of Change Model  Precontemplation    Appears/Sounds:  Cooperative  Motivated  Engaged      Plan:  Next treatment task referral out  Focus on recovery environment  Monitor emotional/physical health      Vaishnavi Esparza

## 2018-09-18 ENCOUNTER — HOSPITAL ENCOUNTER (OUTPATIENT)
Dept: BEHAVIORAL HEALTH | Facility: CLINIC | Age: 53
End: 2018-09-18
Attending: FAMILY MEDICINE
Payer: COMMERCIAL

## 2018-09-18 PROCEDURE — H2035 A/D TX PROGRAM, PER HOUR: HCPCS

## 2018-09-18 PROCEDURE — H2035 A/D TX PROGRAM, PER HOUR: HCPCS | Mod: HQ

## 2018-09-18 PROCEDURE — 10020000 ZZH LODGING PLUS FACILITY CHARGE ADULT

## 2018-09-19 ENCOUNTER — HOSPITAL ENCOUNTER (OUTPATIENT)
Dept: BEHAVIORAL HEALTH | Facility: CLINIC | Age: 53
End: 2018-09-19
Attending: FAMILY MEDICINE
Payer: COMMERCIAL

## 2018-09-19 PROCEDURE — H2035 A/D TX PROGRAM, PER HOUR: HCPCS

## 2018-09-19 PROCEDURE — H2035 A/D TX PROGRAM, PER HOUR: HCPCS | Mod: HQ

## 2018-09-19 PROCEDURE — 10020000 ZZH LODGING PLUS FACILITY CHARGE ADULT

## 2018-09-20 ENCOUNTER — HOSPITAL ENCOUNTER (OUTPATIENT)
Dept: BEHAVIORAL HEALTH | Facility: CLINIC | Age: 53
End: 2018-09-20
Attending: FAMILY MEDICINE
Payer: COMMERCIAL

## 2018-09-20 PROCEDURE — H2035 A/D TX PROGRAM, PER HOUR: HCPCS

## 2018-09-20 PROCEDURE — H2035 A/D TX PROGRAM, PER HOUR: HCPCS | Mod: HQ

## 2018-09-20 PROCEDURE — 10020000 ZZH LODGING PLUS FACILITY CHARGE ADULT
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
## 2018-09-20 NOTE — PROGRESS NOTES
Rule 25 Assessment  Background Information   1. Date of Assessment Request  2. Date of Assessment  9/6/2018 3. Date Service Authorized     4.   LAKEISHA Loza    5.  Phone Number   [phone] 6. Referent  [facility] [phone] 7. Assessment Site  [facility]     8. Client Name   Mark Berumen 9. Date of Birth  1965 Age  53 year old 10. Gender  male  11. PMI/ Insurance No.  [id number]   12. Client's Primary Language:  English 13. Do you require special accommodations, such as an  or assistance with written material? No   14. Current Address: [address] [place] [address] [place]   [place] [address]   15. Client Phone Numbers: [phone] (home)      16. Tell me what has happened to bring you here today.    The patient completed a Comprehensive Assessment with LAKEISHA Loza on 9/6/2018 when the patient was admitted to the [facility]ging [facility] program with [facility] Funding with a CPA authorizing 28 days of treatment.  However, [facility]'s Central Intake department had determined on 9/12/2018 that the patient actually had active funding with [facility] as of 9/1/2018.  The Comprehensive Assessment completed LAKEISHA Loza on 9/6/2018 is being converted to the Rule 25 format by ADAM Courtney on 9/20/2018 so a retro-active authorization can be obtained from [facility]'s for the L[facility]ging Plus program with a start date of 9/6/2018.  Please call ADAM Courtney at [phone] with the authorization for the Lodging Plus program for this patient as soon as it is available.      Intro from the Comprehensive Assessment completed by LAKEISHA Loza on 9/6/2018:    Mark came to [facility] at [address] [place] [address] for evaluation of a possible substance use disorder. The reason for the assessment was because his drinking has again got out of hand. Rel with SO is very strained. )n  7/31/18 he accidentally overdosed on 80 mg of  methadone which he occasionally takes (not rx'ed) for pain. He was declared legally dead, given Narcan, revived and taken to [facility]. CPS was called because his 7 year old child found him out cold. He said that was a wakeup call. His drinking has continued to escalate. He said CPS has opened a case but are not requiring that he do tx, nor did he want to sign an UNIQUE for them.     17. Have you had other rule 25 assessments?     Yes. When, Where, and What circumstances: The patient has had several prior assessments, with his most recent assessment being completed by [facility] [phone].    DIMENSION I - Acute Intoxication /Withdrawal Potential   1. Chemical use most recent 12 months outside a facility and other significant use history (client self-report)              X = Primary Drug Used   Age of First Use Most Recent Pattern of Use and Duration   Need enough information to show pattern (both frequency and amounts) and to show tolerance for each chemical that has a diagnosis   Date of last use and time, if needed   Withdrawal Potential? Requiring special care Method of use  (oral, smoked, snort, IV, etc)     X   Alcohol     15 HU early 40's 1 qt to 1.75 liters of vodka daily for about a year  34 - 42 sober  LY 1 pint to a 1 qt vodka daily  [age] 9/4/2018    (1 pint of vodka) Some mild symptoms on 9/6/18 Oral      Marijuana/  Hashish   16 HU 15 - 26 daily 1 joint   LY 1x a month     2-3 weeks ago None Smoke      Cocaine/Crack     20 HU 18 - 26 5x's a week 1 gram  LY 2/18 - 4/18 snorting   1 gram a day     5/2018 None Snort      Meth/  Amphetamines   23 HU 21 - 24 few x's a month  Snorting 1/2 gram     Age 24 None Snort      Heroin     53 The patient reported snorting heroin on a handful of occasions in his life, all of which had been in the past year.     5/2018 None Snort      Other Opiates/  Synthetics   [age] for surgery  Percocet 40 - 45  daily 20 - 30 mg. Or more.      46 became an issue after knee replacement, began snorting 20 - 5 mg pills  oxycodone daily for 3 months, then cut down to sporadic use     LY 1-2 x's a week 20 - 30 mg. Snorted. JANAK 10 mg oxycodone snorted 9/5/18     Methadone 40 - 45  110 mg  A day since then a few x's a year. Janak 9/5/18 9/5/2018    Snorted 10 mg of Oxycodone    &     Used 10 mg og methadone Some mild symptoms on 9/6/18 Oral & Snort      Inhalants     N/A           Benzodiazepines     [age] Klonopin occasional  LY 5x's 1 mg 9/5/2018    0.5 mg of Klonopin   Some mild symptoms on 9/6/18 Snort      Hallucinogens     18 HU 16 - 22 sporadic, mushrooms and acid     24 None Oral      Barbiturates/  Sedatives/  Hypnotics N/A           Over-the-Counter Drugs   N/A           Other     20 18 - 24 Ecstacy   HU 21 - 22 3x's a week      24 None Oral      Nicotine     17  HU 30's  ppd  40's  Until now 1/2 ppd     9/6/2018 None Smoke     2. Do you use greater amounts of alcohol/other drugs to feel intoxicated or achieve the desired effect?  Yes.  Or use the same amount and get less of an effect?  Yes.  Example: The patient reported using increased amounts of alcohol.    3A. Have you ever been to detox? Yes    3B. When was the first time? Years ago    3C. How many times since then? 7    3D. Date of most recent detox: 2 weeks ago at [facility]    4.  Withdrawal symptoms: Have you had any of the following withdrawal symptoms?  Past 12 months Recent (past 30 days)   Sweating (Rapid Pulse)  Shaky / Jittery / Tremors  Unable to Sleep  Agitation  Sad / Depressed Feeling  Vivid / Unpleasant Dreams  Nausea / Vomiting  Diarrhea  Diminished Appetite  Unable to Eat Sweating (Rapid Pulse)  Shaky / Jittery / Tremors  Unable to Sleep  Agitation  Sad / Depressed Feeling  Vivid / Unpleasant Dreams  Nausea / Vomiting  Diarrhea  Diminished Appetite  Unable to Eat     's Visual Observations and Symptoms: The patient's blood pressure was  elevated on 2018: 151/101    Based on the above information, is withdrawal likely to require attention as part of treatment participation?  Yes, explain: pt may be in mild wd. He reports drinking 1 pint or more vodka missy JANAK 18. On 18 he snorted 10 mg oxycodone, took .5 mg Klonopin, and 10 mg of methadone. His BP was 156/92 HR 79. I did staff him with Dr. Peterson who said he was ok to admit to LP. If the patient begins to display more wd sx's he should be referred to ER.    Dimension I Ratings   Acute intoxication/Withdrawal potential - The placing authority must use the criteria in Dimension I to determine a client s acute intoxication and withdrawal potential.    RISK DESCRIPTIONS - Severity ratin Client can tolerate and cope with withdrawal discomfort. The client displays mild to moderate intoxication or signs and symptoms interfering with daily functioning but does not immediately endanger self or others. Client poses minimal risk of severe withdrawal.    REASONS SEVERITY WAS ASSIGNED (What about the amount of the person s use and date of most recent use and history of withdrawal problems suggests the potential of withdrawal symptoms requiring professional assistance? )     At the time of the assessment his UA was positive for BENZO's, Methadone, and OXY.  His EDDIE was .000. His symptoms in the last 30 days when at [facility] 2 weeks ago included those listed above. He has had a history of about 7 previous formal detoxes. Pt may be in mild wd. He reports drinking 1 pint or more songdkcarlota BRICENO 18. On 18 he snorted 10 mg oxycodone, took .5 mg Klonopin, and 10 mg of methadone. His BP was 156/92 HR 79. I did staff him with Dr. Peterson who said he was ok to admit to LP. If the patient begins to display more wd sx's he should be referred to ER.He does not have any current withdraw symptoms that would prevent him from participation in substance abuse treatment.          DIMENSION II - Biomedical  Complications and Conditions   1. Do you have any current health/medical conditions?(Include any infectious diseases, allergies, or chronic or acute pain, history of chronic conditions)       Yes.   Illnesses/Medical Conditions you are receiving care for:   Past Medical History:   Diagnosis Date     ADD (attention deficit disorder)      Arthritis      Chronic pain      Substance abuse     Etoh     Uncomplicated asthma      He has had rt knee and hip replaced, chronic pain in left knee, has had cervical fusion in his back, rates pain today in lower back and neck as a 4.    2. Do you have a health care provider? When was your most recent appointment? What concerns were identified?     The patient's PCP is Dr. Moreno   [facility].    3. If indicated by answers to items 1 or 2: How do you deal with these concerns? Is that working for you? If you are not receiving care for this problem, why not?      He some times self medicates with street oxy, methadone, or Klonopin.    4A. List current medication(s) including over-the-counter or herbal supplements--including pain management:     Current Outpatient Prescriptions   Medication     Acetaminophen (TYLENOL PO)     alum & mag hydroxide-simethicone (MYLANTA/MAALOX) 200-200-20 MG/5ML SUSP suspension     guaiFENesin (ROBITUSSIN) 20 mg/mL SOLN solution     loratadine (CLARITIN) 10 MG tablet     MELATONIN PO     phenol-menthol (CEPASTAT) 14.5 MG lozenge     senna-docusate (SENOKOT-S;PERICOLACE) 8.6-50 MG per tablet     albuterol (PROAIR HFA/PROVENTIL HFA/VENTOLIN HFA) 108 (90 Base) MCG/ACT inhaler     escitalopram (LEXAPRO) 10 MG tablet     ibuprofen (ADVIL/MOTRIN) 800 MG tablet     nicotine (NICODERM CQ) 14 MG/24HR 24 hr patch     No current facility-administered medications for this encounter.      Facility-Administered Medications Ordered in Other Encounters   Medication     Self Administer Medications: [facility]     4B. Do you follow current medical  recommendations/take medications as prescribed?     No, please explain: He takes them sporadically.    4C. When did you last take your medication?     2018    5. Has a health care provider/healer ever recommended that you reduce or quit alcohol/drug use?     Yes    6. Are you pregnant?     No    7. Have you had any injuries, assaults/violence towards you, accidents, health related issues, overdose(s) or hospitalizations related to your use of alcohol or other drugs:     Yes, explain: Methadone overdose 18  80 mg, ended up [facility] 2 days.    8. Do you have any specific physical needs/accommodations? No    Dimension II Ratings   Biomedical Conditions and Complications - The placing authority must use the criteria in Dimension II to determine a client s biomedical conditions and complications.   RISK DESCRIPTIONS - Severity ratin Client tolerates and mark with physical discomfort and is able to get the services that the client needs.    REASONS SEVERITY WAS ASSIGNED (What physical/medical problems does this person have that would inhibit his or her ability to participate in treatment? What issues does he or she have that require assistance to address?)    He does endorse having the following medical conditions: He has had rt knee and hip replaced, chronic pain in left knee, has had cervical fusion in his back, rates pain today in lower back and neck as a 4. He is currently not taking Lexapro his only jeffery'ed.  He does have MA and a PCP. He does not have any medical condition what would prevent his participation in substance abuse treatment.  His chronic pain is often a trigger for relapse. He some times self medicates with street oxy, methadone, or Klonopin.          DIMENSION III - Emotional, Behavioral, Cognitive Conditions and Complications   1. (Optional) Tell me what it was like growing up in your family. (substance use, mental health, discipline, abuse, support)     [place] 15, then [facility]. He said he hernandez a happy childhood. His dad was a heavy drinker but quit on his own.   He felt supported growing up by: 60%, He was the youngest of 4 so felt forgotten.   Siblings: He was the youngest of 4 siblings, he had 2 brothers (one 57, the oldest overdosed in 2017) 1 sister (55).   Family CD hx: father paternal grandfather, brother, sister.   Family MI hx: mom had depression  Emotional Abuse: none  Physical Abuse: none  Sexual abuse: none  Discipline growing up was: normal, spankings.    2. When was the last time that you had significant problems...  A. with feeling very trapped, lonely, sad, blue, depressed or hopeless  about the future? 2 - 12 months ago 7/18 when he was suicidal and on psych unit at . Dx'ed with depression 7/18 put on Lexapro, but takes it sporadically.    B. with sleep trouble, such as bad dreams, sleeping restlessly, or falling  asleep during the day? Never    C. with feeling very anxious, nervous, tense, scared, panicked, or like  something bad was going to happen? Never    D. with becoming very distressed and upset when something reminded  you of the past? Past Month  A little bit, he has some regrets about his past use.     E. with thinking about ending your life or committing suicide? Past Month  Only 7/18, SI only, no plan, no SA.    3. When was the last time that you did the following things two or more times?  A. Lied or conned to get things you wanted or to avoid having to do  something? Past Month    B. Had a hard time paying attention at school, work, or home? Dx'ed with ADD in his 30's, on Adderall went off from 47 - 52, then back on it until 8/18 and wants familia get back on it.       C. Had a hard time listening to instructions at school, work, or home? See above. He said without meds he tends to be more scattered.     D. Were a bully or threatened other people? Never    E. Started physical fights with other people?  Never    Note: These questions are from the Global Appraisal of Individual Needs--Short Screener. Any item marked  past month  or  2 to 12 months ago  will be scored with a severity rating of at least 2.     For each item that has occurred in the past month or past year ask follow up questions to determine how often the person has felt this way or has the behavior occurred? How recently? How has it affected their daily living? And, whether they were using or in withdrawal at the time?    4A. If the person has answered item 2E with  in the past year  or  the past month , ask about frequency and history of suicide in the family or someone close and whether they were under the influence.     The patient denied having any history of suicide attempts or suicide ideation.    Any history of suicide in your family? Or someone close to you?     Yes, explain: brother attempted suicide, 15 years ago.He attempted to hang himself in detention. He  of an right eye 19. He was patient's best friend, he has not resolved it.     4B. If the person answered item 2E  in the past month  ask about  intent, plan, means and access and any other follow-up information  to determine imminent risk. Document any actions taken to intervene  on any identified imminent risk.      The patient denied having any history of suicide attempts or suicide ideation.    5A. Have you ever been diagnosed with a mental health problem?     Yes, If yes explain: depression 2018, ADD in his 30's,     5B. Are you receiving care for any mental health issues? If yes, what is the focus of that care or treatment?  Are you satisfied with the service? Most recent appointment?  How has it been helpful?     Yes, Taking medications.  He worked with a therapist in 2017 1x a week for 6 months, it was helpful, quit when he lost his insurance.    6. Have you been prescribed medications for emotional/psychological problems?     Yes, Adderall in his 30's for ADD. He took it  regularly in his 30's an early 40's, none from 47 - 52, had been back on it up until a few months ago. He would like to get back on it.     7. Does your MH provider know about your use?     NA    8A. Have you ever been verbally, emotionally, physically or sexually abused?      No     Follow up questions to learn current risk, continuing emotional impact.      NA    8B. Have you received counseling for abuse?      N/A    9. Have you ever experienced or been part of a group that experienced community violence, historical trauma, rape or assault?     No    10A. :    No    11. Do you have problems with any of the following things in your daily life?    Problem Solving, Concentration, Performing your job/school work and In relationships with others    Note: If the person has any of the above problems, follow up with items 12, 13, and 14. If none of the issues in item 11 are a problem for the person, skip to item 15.    The patient would benefit from developing sober coping skills.    12. Have you been diagnosed with traumatic brain injury or Alzheimer s?  No    13. If the answer to #12 is no, ask the following questions:    Have you ever hit your head or been hit on the head? Yes    Were you ever seen in the Emergency Room, hospital or by a doctor because of an injury to your head? Yes    Have you had any significant illness that affected your brain (brain tumor, meningitis, West Nile Virus, stroke or seizure, heart attack, near drowning or near suffocation)? No    14. If the answer to #12 is yes, ask if any of the problems identified in #11 occurred since the head injury or loss of oxygen. No    15A. Highest grade of school completed:     Associate degree/vocational certificate    15B. Do you have a learning disability? No    15C. Did you ever have tutoring in Math or English? No    15D. Have you ever been diagnosed with Fetal Alcohol Effects or Fetal Alcohol Syndrome? No    16. If yes to item 15 B, C, or D: How  has this affected your use or been affected by your use?     NA    Dimension III Ratings   Emotional/Behavioral/Cognitive - The placing authority must use the criteria in Dimension III to determine a client s emotional, behavioral, and cognitive conditions and complications.   RISK DESCRIPTIONS - Severity ratin Client has difficulty with impulse control and lacks coping skills. Client has thoughts of suicide or harm to others without means; however, the thoughts may interfere with participation in some treatment activities. Client has difficulty functioning in significant life areas. Client has moderate symptoms of emotional, behavioral, or cognitive problems. Client is able to participate in most treatment activities.    REASONS SEVERITY WAS ASSIGNED - What current issues might with thinking, feelings or behavior pose barriers to participation in a treatment program? What coping skills or other assets does the person have to offset those issues? Are these problems that can be initially accommodated by a treatment provider? If not, what specialized skills or attributes must a provider have?    He reported being previously being diagnosed with ADD in his 30's, Depression in . He is currently not taking his rx'ed Lexapro. He is not seeing a therapist. He denies a history of SA/SIB/HI/HA. He states he had some SI in  with no plan and has had no thoughts since then.       The most traumatic events in his life have included:Death of brother about 2 years ago (they were best friends he still hasn't dealt with it), suicide death of best friend when pt was 20, death of father .     The patient's PHQ-9 score was 12 out of 27, indicating moderate depression.  The patient's MAHENDRA-7 score was 5 out of 21, indicating mild anxiety     [place], raised [place] , then [facility]. He said he had a happy childhood. His dad was a heavy drinker but quit on his own.   He felt supported  growing up by: 60%, He was the youngest of 4 so felt forgotten.   Siblings: He was the youngest of 4 siblings, he had 2 brothers (one 57, the oldest overdosed in 2017) 1 sister (55).   Family CD hx: father paternal grandfather, brother, sister.   Family MI hx: mom had depression  Emotional Abuse: none  Physical Abuse: none  Sexual abuse: none  Discipline growing up was: normal, spankings.         DIMENSION IV - Readiness for Change   1. You ve told me what brought you here today. (first section) What do you think the problem really is?     His own awareness that he needs help and pressure from others to get help.    2. Tell me how things are going. Ask enough questions to determine whether the person has use related problems or assets that can be built upon in the following areas: Family/friends/relationships; Legal; Financial; Emotional; Educational; Recreational/ leisure; Vocational/employment; Living arrangements (DSM)      He is unemployed and has excessive unstructured free time which can increase his risk of relapse. He lives with his GF who he said has been an active alcoholic, she has been through 6 tx's and now agreed to stop and go to meetings again. He has financial pressures and estimates that he has about $30k of debt. He lacks healthy chemically free leisure activities and interests. He said his home life has been consumed with helping to take care of 3  7 year old triplets. He does not have a DL because of unpaid tickets. His GF was recently laid off. He was laid off about 3 weeks ago for missing work when using. He has been a  for 27 years and loves the work. He has not current legals. He said CPS opened a case because his kids found him when he overdosed on methadone in July 2018. He said [facility] is not requiring that he do tx and didn't want to sign an UNIQUE for them.     3. What activities have you engaged in when using alcohol/other drugs that could be hazardous to you or others (i.e.  driving a car/motorcycle/boat, operating machinery, unsafe sex, sharing needles for drugs or tattoos, etc     The patient reported having a history of driving while under the influnece of alcohol or drugs.    4. How much time do you spend getting, using or getting over using alcohol or drugs? (DSM)     On a daily basis    5. Reasons for drinking/drug use (Use the space below to record answers. It may not be necessary to ask each item.)  Like the feeling Yes   Trying to forget problems Yes   To cope with stress Yes   To relieve physical pain Yes   To cope with anxiety No   To cope with depression Yes   To relax or unwind Yes   Makes it easier to talk with people No   Partner encourages use Yes   Most friends drink or use Yes   To cope with family problems Yes   Afraid of withdrawal symptoms/to feel better Yes   Other (specify)  N/A     A. What concerns other people about your alcohol or drug use/Has anyone told you that you use too much? What did they say? (DSM)     His GF and his sister had shared concern with him about his substance abuse.    B. What did you think about that/ do you think you have a problem with alcohol or drug use?     He agrees he has a substance abuse problem.    6. What changes are you willing to make? What substance are you willing to stop using? How are you going to do that? Have you tried that before? What interfered with your success with that goal?      His plan and goal is to abstain from non-prescribed all mood altering chemicals.     7. What would be helpful to you in making this change?     Entering the [facility]ging [facility] program for primary CD treatment, developing sober coping skills , developing long-term sober maintenance skills, developing a sober peer support network, addressing dual issues of MI and CD and obtaining a sober living environment.    Dimension IV Ratings   Readiness for Change - The placing authority must use the criteria in Dimension IV to determine a client s readiness  for change.   RISK DESCRIPTIONS - Severity ratin Client is motivated with active reinforcement, to explore treatment and strategies for change, but ambivalent about illness or need for change.    REASONS SEVERITY WAS ASSIGNED - (What information did the person provide that supports your assessment of his or her readiness to change? How aware is the person of problems caused by continued use? How willing is she or he to make changes? What does the person feel would be helpful? What has the person been able to do without help?)      He verbalizes motivation to abstain from all mood altering substances but has lacked the consistent behavior to maintain abstinence. He is in the pre-contemplative stage of change. He identifies his current motivation for change as a 10 on a 10 point scale. His primary motivation for treatment at this time appears to be some of self motivation, some from his GF and CPS is involved. He was cooperative with the evaluation process and appeared motivated for sobriety and given recommendations.         DIMENSION V - Relapse, Continued Use, and Continued Problem Potential   1. In what ways have you tried to control, cut-down or quit your use? If you have had periods of sobriety, how did you accomplish that? What was helpful? What happened to prevent you from continuing your sobriety? (DSM)     32 - 40 after tx, followed by weekly NA and a had a sponsor. His wife was an active alcoholic that entire time.    2. Have you experienced cravings? If yes, ask follow up questions to determine if the person recognizes triggers and if the person has had any success in dealing with them.     The patient reported having cravings to use mood altering chemicals on a daily basis.    3. Have you been treated for alcohol/other drug abuse/dependence?     Yes  4B. Number of times(lifetime) (over what period) 5.   4C. Number of times completed treatment (lifetime) 4.   4D. During the past three years have you  participated in outpatient and/or residential?  Yes    Age 26 [facility] clean 5 months  [address] [facility] 34  Clean for 8 years  [facility] 11/20/10 clean for 2 years  [facility]  clean for 3 years  [facility] OP 2015 did not complete    4. Support group participation: Have you/do you attend support group meetings to reduce/stop your alcohol/drug use? How recently? What was your experience? Are you willing to restart? If the person has not participated, is he or she willing?     He has attended 12 step groups off and on since 26, he like 12 step groups. Last meeting was 1-2 years ago. He like NA best.    5. What would assist you in staying sober/straight?     Entering [facility] for primary CD treatment, developing sober coping skills , developing long-term sober maintenance skills, developing a sober peer support network, addressing dual issues of MI and CD and obtaining a sober living environment.    Dimension V Ratings   Relapse/Continued Use/Continued problem potential - The placing authority must use the criteria in Dimension V to determine a client s relapse, continued use, and continued problem potential.   RISK DESCRIPTIONS - Severity ratin No awareness of the negative impact of mental health problems or substance abuse. No coping skills to arrest mental health or addiction illnesses, or prevent relapse.    REASONS SEVERITY WAS ASSIGNED - (What information did the person provide that indicates his or her understanding of relapse issues? What about the person s experience indicates how prone he or she is to relapse? What coping skills does the person have that decrease relapse potential?)      He has continued to abuse mood altering substances despite negative consequences in multiple life areas and 5 or 6 Substance Abuse treatment attempts.He reports difficulty with impulse controls and lacks relapse prevention, recovery management skills to arrest substance abuse. His inadequately treated  psychiatric issues increase his risk of relapse I.e. ADD, depression and his chronic pain.  He has a family history of addiction i.e. Father, brother, sister.          DIMENSION VI - Recovery Environment   1. Are you employed/attending school? Tell me about that.     Unemployed for 3 weeks. 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     2A. Describe a typical day; evening for you. Work, school, social, leisure, volunteer, spiritual practices. Include time spent obtaining, using, recovering from drugs or alcohol. (DSM)     Since not working, get up, have a drink, clean up house, take care of kids. Nothing else. Family life is all consuming    2B. How often do you spend more time than you planned using or use more than you planned? (DSM)     On a daily basis    3. How important is using to your social connections? Do many of your family or friends use?     Some of his friends a social users, several straight friends.     4A. Are you currently in a significant relationship?     Yes.  4B. How long? 8 years    4C. Sexual Orientation:     Heterosexual    5A. Who do you live with?      ALFREDO (together 8 years) and 7 year old tripplets, She was a  asst., she is also unemployed.     5B. Tell me about their alcohol/drug use and mental health issues.     ALFREDO has been through tx 6x's, she recently just quit on her own and plans to go to meetings.     5C. Are you concerned for your safety there? No    5D. Are you concerned about the safety of anyone else who lives with you? No    6A. Do you have children who live with you?     Yes.  (Ask follow-up questions to determine the person s relationship and responsibility, both legal and care giving; and what arrangements are made for supervision for the children when the person is not available.) 7 year old triplets (2 dtrs. And a son).    6B. Do you have children who do not live with you?     Yes.  (Ask follow  up questions to learn where the children are, who has custody and what the person s relationship and responsibility is with these children and what hopes the person has for his or her future with these children.) 2 sons, 26 and 23.    7A. Who supports you in making changes in your alcohol or drug use? What are they willing to do to support you? Who is upset or angry about you making changes in your alcohol or drug use? How big a problem is this for you?      His GF and his sister    7B. This table is provided to record information about the person s relationships and available support It is not necessary to ask each item; only to get a comprehensive picture of their support system.  How often can you count on the following people when you need someone?   Partner / Spouse Usually supportive   Parent(s)/Aunt(s)/Uncle(s)/Grandparents Usually supportive   Sibling(s)/Cousin(s) Usually supportive   Child(jennifer) Usually supportive   Other relative(s) Usually supportive   Friend(s)/neighbor(s) Usually supportive   Child(jennifer) s father(s)/mother(s) Usually supportive   Support group member(s) Usually supportive   Community of ana lilia members N/A   /counselor/therapist/healer N/A   Other (specify) N/A     8A. What is your current living situation?     He lives with his GF (together 8 years) and 7 year old tripplets, She was a  asst., she is also unemployed.     8B. What is your long term plan for where you will be living?     The plan is to continue to live at current residence.    8C. Tell me about your living environment/neighborhood? Ask enough follow up questions to determine safety, criminal activity, availability of alcohol and drugs, supportive or antagonistic to the person making changes.      Safe, no drugs avail.     9. Criminal justice history: Gather current/recent history and any significant history related to substance use--Arrests? Convictions? Circumstances? Alcohol or drug  involvement? Sentences? Still on probation or parole? Expectations of the court? Current court order? Any sex offenses - lifetime? What level? (DSM)    3 DWI's, 88, 89, 2011.  Possession 5th degree, 2012,  Nothing current.     10. What obstacles exist to participating in treatment? (Time off work, childcare, funding, transportation, pending prison time, living situation)     None    Dimension VI Ratings   Recovery environment - The placing authority must use the criteria in Dimension VI to determine a client s recovery environment.   RISK DESCRIPTIONS - Severity rating: 3 Client is not engaged in structured, meaningful activity and the client s peers, family, significant other, and living environment are unsupportive, or there is significant criminal justice system involvement.    REASONS SEVERITY WAS ASSIGNED - (What support does the person have for making changes? What structure/stability does the person have in his or her daily life that will increase the likelihood that changes can be sustained? What problems exist in the person s environment that will jeopardize getting/staying clean and sober?)     He is unemployed and has excessive unstructured free time which can increase his risk of relapse. He lives with his GF who he said has been an active alcoholic, she has been through 6 tx's and now agreed to stop and go to meetings again. He has financial pressures and estimates that he has about $30k of debt. He lacks healthy chemically free leisure activities and interests. He said his home life has been consumed with helping to take care of 3  7 year old triplets. He does not have a DL because of unpaid tickets. His GF was recently laid off. He was laid off about 3 weeks ago for missing work when using. He has been a  for 27 years and loves the work. He has not current legals. He said [facility] opened a case because his kids found him when he overdosed on methadone in July 2018. He said [facility] is not requiring  that he do tx and didn't want to sign an UNIQUE for them.          Client Choice/Exceptions   Would you like services specific to language, age, gender, culture, Hindu preference, race, ethnicity, sexual orientation or disability?  No    What particular treatment choices and options would you like to have? LP program    Do you have a preference for a particular treatment program? [facility]    Criteria for Diagnosis     Criteria for Diagnosis  DSM-5 Criteria for Substance Use Disorder  Instructions: Determine whether the client currently meets the criteria for Substance Use Disorder using the diagnostic criteria in the DSM-V [id number].[phone]. Current means during the most recent 12 months outside a facility that controls access to substances    Category of Substance Severity (ICD-10 Code / DSM 5 Code)     Alcohol Use Disorder Severe  (10.20) (303.90)   Cannabis Use Disorder Moderate  (F12.20) (304.30)   Hallucinogen Use Disorder NA   Inhalant Use Disorder NA   Opioid Use Disorder Severe   (F11.20) (304.00)   Sedative, Hypnotic, or Anxiolytic Use Disorder NA   Stimulant Related Disorder Moderate   (F14.20) (304.20) Cocaine   Tobacco Use Disorder Moderate   (F17.200) (305.1)   Other (or unknown) Substance Use Disorder NA       Collateral Contact Summary   Number of contacts made: 1    Contact with referring person:  No.    If court related records were reviewed, summarize here: NA    Information from collateral contacts supported/largely agreed with information from the client and associated risk ratings.      Rule 25 Assessment Summary and Plan   's Recommendation    1) Abstain from alcohol, all illicit drugs and all mood altering drugs unless prescribed by a healthcare giver familiar with the patient's addiction.    2) Enter and complete LP at [facility] or similar and follow counselor recommendations.   3) Arrange for sober supportive living upon discharge. He will need to identify  if his GF is able to maintain sobriety in their home.   4) Develop a sober supportive network.  5) Continue to receive appropriate medical and psychiatric services as needed.  6) Follow all the requirements of CPS.  7) Continue to find alternative methods for pain management.        Collateral Contacts     Name:    Electronic medical records at [facility]   Relationship:    NA   Phone Number:    NA Releases:    No     The patient's Electronic medical records at [facility] were reviewed and the information contained in the medical records supported the patient's account of his chemical use history and his chemical use consequences.      ollateral Contacts      A problematic pattern of alcohol/drug use leading to clinically significant impairment or distress, as manifested by at least two of the following, occurring within a 12-month period:    Alcohol/drug is often taken in larger amounts or over a longer period than was intended.  There is a persistent desire or unsuccessful efforts to cut down or control alcohol/drug use  A great deal of time is spent in activities necessary to obtain alcohol, use alcohol, or recover from its effects.  Craving, or a strong desire or urge to use alcohol/drug  Recurrent alcohol/drug use resulting in a failure to fulfill major role obligations at work, school or home.  Continued alcohol use despite having persistent or recurrent social or interpersonal problems caused or exacerbated by the effects of alcohol/drug.  Important social, occupational, or recreational activities are given up or reduced because of alcohol/drug use.  Recurrent alcohol/drug use in situations in which it is physically hazardous.  Alcohol/drug use is continued despite knowledge of having a persistent or recurrent physical or psychological problem that is likely to have been caused or exacerbated by alcohol.  Tolerance, as defined by either of the following: A need for markedly increased amounts of alcohol/drug to  achieve intoxication or desired effect.  Withdrawal, as manifested by either of the following: The characteristic withdrawal syndrome for alcohol/drug (refer to Criteria A and B of the criteria set for alcohol/drug withdrawal).      Specify if: In early remission:  After full criteria for alcohol/drug use disorder were previously met, none of the criteria for alcohol/drug use disorder have been met for at least 3 months but for less than 12 months (with the exception that Criterion A4,  Craving or a strong desire or urge to use alcohol/drug  may be met).     In sustained remission:   After full criteria for alcohol use disorder were previously met, non of the criteria for alcohol/drug use disorder have been met at any time during a period of 12 months or longer (with the exception that Criterion A4,  Craving or strong desire or urge to use alcohol/drug  may be met).   Specify if:   This additional specifier is used if the individual is in an environment where access to alcohol is restricted.    Mild: Presence of 2-3 symptoms    Moderate: Presence of 4-5 symptoms    Severe: Presence of 6 or more symptoms

## 2018-09-21 ENCOUNTER — HOSPITAL ENCOUNTER (OUTPATIENT)
Dept: BEHAVIORAL HEALTH | Facility: CLINIC | Age: 53
End: 2018-09-21
Attending: FAMILY MEDICINE
Payer: COMMERCIAL

## 2018-09-21 PROCEDURE — H2035 A/D TX PROGRAM, PER HOUR: HCPCS

## 2018-09-21 PROCEDURE — 10020000 ZZH LODGING PLUS FACILITY CHARGE ADULT

## 2018-09-21 PROCEDURE — H2035 A/D TX PROGRAM, PER HOUR: HCPCS | Mod: HQ

## 2018-09-22 ENCOUNTER — HOSPITAL ENCOUNTER (OUTPATIENT)
Dept: BEHAVIORAL HEALTH | Facility: CLINIC | Age: 53
End: 2018-09-22
Attending: FAMILY MEDICINE
Payer: COMMERCIAL

## 2018-09-22 PROCEDURE — H2035 A/D TX PROGRAM, PER HOUR: HCPCS

## 2018-09-22 PROCEDURE — 10020000 ZZH LODGING PLUS FACILITY CHARGE ADULT

## 2018-09-23 ENCOUNTER — HOSPITAL ENCOUNTER (OUTPATIENT)
Dept: BEHAVIORAL HEALTH | Facility: CLINIC | Age: 53
End: 2018-09-23
Attending: FAMILY MEDICINE
Payer: COMMERCIAL

## 2018-09-23 PROCEDURE — 10020000 ZZH LODGING PLUS FACILITY CHARGE ADULT

## 2018-09-23 PROCEDURE — H2035 A/D TX PROGRAM, PER HOUR: HCPCS | Mod: HQ

## 2018-09-24 ENCOUNTER — HOSPITAL ENCOUNTER (OUTPATIENT)
Dept: BEHAVIORAL HEALTH | Facility: CLINIC | Age: 53
End: 2018-09-24
Attending: FAMILY MEDICINE
Payer: COMMERCIAL

## 2018-09-24 PROCEDURE — H2035 A/D TX PROGRAM, PER HOUR: HCPCS | Mod: HQ

## 2018-09-24 PROCEDURE — 10020000 ZZH LODGING PLUS FACILITY CHARGE ADULT

## 2018-09-24 PROCEDURE — H2035 A/D TX PROGRAM, PER HOUR: HCPCS

## 2018-09-24 NOTE — PROGRESS NOTES
Patient:  Mark Berumen            Adult CD Progress Note and Treatment Plan Review     Attendance  Please refer to OP BEH CD Adult Attendance Record Documentation Flowsheet    Support group attended this week: yes    Reporting sobriety:  yes    Treatment Plan     Treatment Plan Review competed on: 9-24-18       Client preferred learning style: Visual  Hands on  Verbal    Staff Members contributing LAKEISHA Davison,LAKEISHA Elise,Veronica Browning,LAKEISHA,LAKEISHA Cisneros.                     Received Supervision: No    Client: contributed to goals and plan.    Client received copy of plan/revised plan: Yes    Client agrees with plan/revised plan: Yes        Changes to Treatment Plan: No    New Goals added since last review None needed.    Goals worked on since last review Understanding how his addiction has affected his mental health.Working with the staff psychotherapist to start the grieving process over his brothers death.    Strategies effective: yes    Strategies need these changes: None needed.    1) Care Coordination Activities:  Working with .  2) Medical, Mental Health and other appointments the client attended: Psychotherapist.  3) Medication issues: Med compliant.  4) Physical and mental health problems: Pt.reports he has none.  5) Any changes in Vulnerable Adult Status?  No If yes, add to treatment plan and individual abuse prevention plan.  6) Review and evaluation of the individual abuse prevention plan: Current IAPP for this program is adequate for this client          ASAM Risk Rating:    Dimension 1 0 No changes.    Dimension 2 1 No changes.    Dimension 3 2 Pt.reports he has had no suicidal ideation.Pt.saw the staff psychotherapist.Pt.completed grief and loss assignment.He continues to work on setting healthy boundaries with his girlfriend.    Dimension 4 1 Pt.has been attending all lectures and groups.Pt.has presented his first step and drug use history assignments.Pt.continues to  "increase his internal motivation to change.    Dimension 5 4 Pt.continues to read \"Staying Sober\" book.Pt.needs to identify his relapse warning signs and triggers in the areas of people,places,activites and feelings.Pt.is working on \"Coping with Addiction and Chronic Pain\" assignment.Pt.continues to be motivated at this time.      Dimension 6 3 Pt.is attending 5 12 step meetings per week while in treatment.Pt.needs to reconnect with his sponsor.Pt.is working with  to find sober living and not return home.      Guide to Risk Ratings for Suicidality:   IDEATION: Active thoughts of suicide? INTENT: Intent to follow on suicide? PLAN: Plan to follow through on suicide? Level of Risk:   IF Yes Yes Yes Patient = High Emergent   IF Yes Yes No Patient = High Urgent/Non-Emergent   IF Yes No No Patient = Moderate Non-Urgent   IF No No   No Patient = Low Risk   The patient's ADDITIONAL RISK FACTORS and lack of PROTECTIVE FACTORS may increase their overall suicide risk ratings.     Patient's/client's current risk rating:  Low Risk    Family Involvement:   UNIQUE signed    Data:   offered feedback good insight client did actively participate      Intervention:   Aftercare planning  Behavior modification  Cognitive Behavioral Therapy  Counselor feedback  Education  Emotional management  Group feedback  Relapse prevention  Twelve Step facilitation      Assessment:   Stages of Change Model  Preparation/Determination    Appears/Sounds:  Cooperative  Motivated  Engaged      Plan:  Continue group therapy.      LAKEISHA Rojas        "

## 2018-09-25 ENCOUNTER — HOSPITAL ENCOUNTER (OUTPATIENT)
Dept: BEHAVIORAL HEALTH | Facility: CLINIC | Age: 53
End: 2018-09-25
Attending: FAMILY MEDICINE
Payer: COMMERCIAL

## 2018-09-25 PROCEDURE — H2035 A/D TX PROGRAM, PER HOUR: HCPCS

## 2018-09-25 PROCEDURE — H2035 A/D TX PROGRAM, PER HOUR: HCPCS | Mod: HQ

## 2018-09-25 PROCEDURE — 10020000 ZZH LODGING PLUS FACILITY CHARGE ADULT

## 2018-09-26 ENCOUNTER — HOSPITAL ENCOUNTER (OUTPATIENT)
Dept: BEHAVIORAL HEALTH | Facility: CLINIC | Age: 53
End: 2018-09-26
Attending: FAMILY MEDICINE
Payer: COMMERCIAL

## 2018-09-26 PROCEDURE — H2035 A/D TX PROGRAM, PER HOUR: HCPCS

## 2018-09-26 PROCEDURE — H2035 A/D TX PROGRAM, PER HOUR: HCPCS | Mod: HQ

## 2018-09-26 PROCEDURE — 10020000 ZZH LODGING PLUS FACILITY CHARGE ADULT

## 2018-09-26 NOTE — PROGRESS NOTES
"INDIVIDUAL SESSION SUMMARY    D) Met with client on 9/26/18 from 1:30-2:30. Client reported that he's been finding the exercises from the anxiety research study to be helpful and that he took a break for the last week from calling his ex GF. Client spoke about how he's reflected on the last conversation with his ex GF and that he wants to call and apologize for his \"outburst\". Client stated that this incident with his ex GF brings up the pain he felt during his divorce and how that he feels he's stuck in negative relationship pattern. Client spoke about needing to focus on himself- rebuilding trust with himself-and with his ex so he can continue to have a relationship with the triplets. For self-care, client reported that he's reading the Big Book and praying. Client stated he would like to continue with therapy and this therapist offered to provide some referrals. Client took information on [facility] sober houses and while in session made contact with a sober  in the [facility]. Client stated he felt a lot less anxious about his aftercare and housing situation. Client stated his willingness to start his gratitude homework.     I) Individual session with client. Provided client with verbal interventions including: validation, nurturing, compassion and support. Discussed the benefits of: gratitude. Assigned homework on daily gratitude list.      A) Client appears to be reacting in healthy ways when he is frustrated or hurt; taking time to reflect on his part. Client appears to be using some new tools for emotional regulation and stress management. Client appears to lack a sober support network and has taken steps to secure sober housing so not to return to an unsafe environement. Client appears to be taking steps to set up a daily routine, meaningful activities, and a sense of purpose. Client appears to have internal motivation and would benefit from continuing support to help with emotional regulation, " impulse control, and increasing self-esteem. Client appears to be processing his feelings of guilt and shame and grief related to his brother's death.     P) Next session is scheduled for 10/2/18.      OSWALDO Forbes  9/26/2018

## 2018-09-27 ENCOUNTER — HOSPITAL ENCOUNTER (OUTPATIENT)
Dept: BEHAVIORAL HEALTH | Facility: CLINIC | Age: 53
End: 2018-09-27
Attending: FAMILY MEDICINE
Payer: COMMERCIAL

## 2018-09-27 PROCEDURE — H2035 A/D TX PROGRAM, PER HOUR: HCPCS | Mod: HQ

## 2018-09-27 PROCEDURE — 10020000 ZZH LODGING PLUS FACILITY CHARGE ADULT

## 2018-09-27 PROCEDURE — H2035 A/D TX PROGRAM, PER HOUR: HCPCS

## 2018-09-28 ENCOUNTER — HOSPITAL ENCOUNTER (OUTPATIENT)
Dept: BEHAVIORAL HEALTH | Facility: CLINIC | Age: 53
End: 2018-09-28
Attending: FAMILY MEDICINE
Payer: COMMERCIAL

## 2018-09-28 PROCEDURE — H2035 A/D TX PROGRAM, PER HOUR: HCPCS

## 2018-09-28 PROCEDURE — 10020000 ZZH LODGING PLUS FACILITY CHARGE ADULT

## 2018-09-28 PROCEDURE — H2035 A/D TX PROGRAM, PER HOUR: HCPCS | Mod: HQ

## 2018-09-29 ENCOUNTER — HOSPITAL ENCOUNTER (OUTPATIENT)
Dept: BEHAVIORAL HEALTH | Facility: CLINIC | Age: 53
End: 2018-09-29
Attending: FAMILY MEDICINE
Payer: COMMERCIAL

## 2018-09-29 PROCEDURE — 10020000 ZZH LODGING PLUS FACILITY CHARGE ADULT

## 2018-09-29 PROCEDURE — H2035 A/D TX PROGRAM, PER HOUR: HCPCS

## 2018-09-30 ENCOUNTER — HOSPITAL ENCOUNTER (OUTPATIENT)
Dept: BEHAVIORAL HEALTH | Facility: CLINIC | Age: 53
End: 2018-09-30
Attending: FAMILY MEDICINE
Payer: COMMERCIAL

## 2018-09-30 PROCEDURE — 10020000 ZZH LODGING PLUS FACILITY CHARGE ADULT

## 2018-09-30 PROCEDURE — H2035 A/D TX PROGRAM, PER HOUR: HCPCS | Mod: HQ

## 2018-09-30 NOTE — PROGRESS NOTES
Patient:  Mark Berumen            Adult CD Progress Note and Treatment Plan Review     Attendance  Please refer to OP BEH CD Adult Attendance Record Documentation Flowsheet    Support group attended this week: yes    Reporting sobriety:  yes    Treatment Plan     Treatment Plan Review competed on: 9/30/18       Client preferred learning style: Hands on  Verbal  Demonstration    Staff Members contributing Vaishnavi Esparza, Intern [facility]; Martell Medellin [facility]; Sebas Rivera [facility]; Veronica Browning [facility].                     Received Supervision: Yes    Client: contributed to goals and plan.    Client received copy of plan/revised plan: Yes    Client agrees with plan/revised plan: Yes        Changes to Treatment Plan: No    New Goals added since last review No new goals added.     Goals worked on since last review Relationships; Spiritual Care, Relapse, Warning Signs and Triggers, Self-Care, STD's.    Strategies effective: yes    Strategies need these changes: None.     1) Care Coordination Activities:  Pt met with  program  on 9/13 to discuss aftercare programs.  Pt looking at possibly [facility] in [place].    2) Medical, Mental Health and other appointments the client attended:  Pt met with  program staff Psychotherapist on 9/26/18.  Nest appt scheduled for 10.2.18.  3) Medication issues: None.   4) Physical and mental health problems: Pt has chronic pain issues with shoulder, back, neck, and knee.  Pt is managing his symptoms while in  and is able to actively participate in programming.    5) Any changes in Vulnerable Adult Status?  No If yes, add to treatment plan and individual abuse prevention plan.  6) Review and evaluation of the individual abuse prevention plan: Current IAPP for this program is adequate for this client          ASAM Risk Rating:    Dimension 1 0 - No changes.     Dimension 2  1-  No changes.     Dimension 3 2 - pot denies any SIB/SA/SI at this time.  Pt has been seeing   "program staff psychotherapist.  Pt completed grief and loss and setting boundaries assignments and pt continues to work on these areas.      Dimension 4 1 - Pt has been working on increasing his spiritual awareness as it relates to his recovery.  Pt completed \"The Mathew Book of the Spiritual Side\" assignment and shared a 2pg reflection paper on what related to his own recovery with the group.      Dimension 5 4- Pt completed relapse prevention skills packet \"Putting it All together and shared with his group.  Pt is working on gaining awareness of the relapse process.  Pt will complete \"Staying Sober\" assignment and share a 2pg paper of what he related to regarding his own personal relapse triggers and cues.      Dimension 6 3 - Pt continues to work on building his sober network and has been attending (5) 12-step meetings/week while in [place].  Pt has been meeting with program  to discuss aftercare options.        Guide to Risk Ratings for Suicidality:   IDEATION: Active thoughts of suicide? INTENT: Intent to follow on suicide? PLAN: Plan to follow through on suicide? Level of Risk:   IF Yes Yes Yes Patient = High Emergent   IF Yes Yes No Patient = High Urgent/Non-Emergent   IF Yes No No Patient = Moderate Non-Urgent   IF No No   No Patient = Low Risk   The patient's ADDITIONAL RISK FACTORS and lack of PROTECTIVE FACTORS may increase their overall suicide risk ratings.     Patient's/client's current risk rating:  Low Risk    Family Involvement:   UNIQUE signed    Data:   offered feedback good insight client did actively participate      Intervention:   Aftercare planning  Behavior modification  Cognitive Behavioral Therapy  Counselor feedback  Education  Emotional management  Group feedback  Relapse prevention  Twelve Step facilitation      Assessment:   Stages of Change Model  Preparation/Determination    Appears/Sounds:  Cooperative  Motivated  Engaged      Plan:  Next treatment task referral out  Focus on " recovery environment      Vaishnavi CORRALES Semi

## 2018-10-01 ENCOUNTER — HOSPITAL ENCOUNTER (OUTPATIENT)
Dept: BEHAVIORAL HEALTH | Facility: CLINIC | Age: 53
End: 2018-10-01
Attending: FAMILY MEDICINE
Payer: MEDICAID

## 2018-10-01 PROCEDURE — 10020000 ZZH LODGING PLUS FACILITY CHARGE ADULT

## 2018-10-01 PROCEDURE — H2035 A/D TX PROGRAM, PER HOUR: HCPCS | Mod: HQ

## 2018-10-01 PROCEDURE — H2035 A/D TX PROGRAM, PER HOUR: HCPCS

## 2018-10-02 ENCOUNTER — HOSPITAL ENCOUNTER (OUTPATIENT)
Dept: BEHAVIORAL HEALTH | Facility: CLINIC | Age: 53
End: 2018-10-02
Attending: FAMILY MEDICINE
Payer: MEDICAID

## 2018-10-02 PROCEDURE — H2035 A/D TX PROGRAM, PER HOUR: HCPCS

## 2018-10-02 PROCEDURE — H2035 A/D TX PROGRAM, PER HOUR: HCPCS | Mod: HQ

## 2018-10-02 PROCEDURE — 10020000 ZZH LODGING PLUS FACILITY CHARGE ADULT

## 2018-10-02 NOTE — PROGRESS NOTES
"INDIVIDUAL SESSION SUMMARY    D) Met with client on 10/2/18 from 1:30-2:30. Client shared that he will be moving into his sober house on Thursday and that he is [facility]. Client shared that he has not been in contact with his ex GF since last session other than to leave her a voice mail apologizing for his behavior the last time they spoke. Client spoke about visiting with family on the weekend and how his siblings and sons are showing their support. Client spoke about wanting to see his triplets this week and how he hoped his ex GF would not prevent a visit. Client discussed his hope that his ex GF will also seek treatment for herself and his commitment to staying single at this time. Client reported that he has never lived alone and that he's looking forward to learning to live alone. 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 Client took several therapy referrals to contact in the community.      I) Individual session with client. Provided client with verbal interventions including: validation, compassion and support. Therapist encouraged client to continue practicing healthy communication skills with his ex GF and to continue with individual therapy to help navigate and get feedback on the relationship with his ex GF and the triplets.     A) Client appears to be reacting in healthy ways when he is frustrated or hurt; taking time to reflect on his part. Client appears to be using some new tools for emotional regulation and stress management. Client appears to lack a sober support network and has taken steps to secure sober housing in order to connect with sober peers. Client appears to be taking steps to set up a daily routine, meaningful activities, and a sense of purpose. Client appears to have internal motivation and would benefit from continuing support to help with emotional regulation, impulse " control, and increasing self-esteem. Client appears to be processing his feelings of guilt and shame and grief related to his brother's death.    P) No future sessions scheduled. This therapist has provided the client with several therapist referrals to contact in the community.   OSWALDO Forbes  10/2/2018

## 2018-10-03 ENCOUNTER — HOSPITAL ENCOUNTER (OUTPATIENT)
Dept: BEHAVIORAL HEALTH | Facility: CLINIC | Age: 53
End: 2018-10-03
Attending: FAMILY MEDICINE
Payer: MEDICAID

## 2018-10-03 LAB — BARBITURATES UR QL: NEGATIVE

## 2018-10-03 PROCEDURE — 10020000 ZZH LODGING PLUS FACILITY CHARGE ADULT

## 2018-10-03 PROCEDURE — 80307 DRUG TEST PRSMV CHEM ANLYZR: CPT | Performed by: FAMILY MEDICINE

## 2018-10-03 PROCEDURE — H2035 A/D TX PROGRAM, PER HOUR: HCPCS | Mod: HQ

## 2018-10-04 NOTE — PROGRESS NOTES
MICD Discharge Summary/Instructions     Patient: Mark Berumen  MRN: [id number]   : 1965 Age: 53 year old Sex: male   -  Focus of Treatment / Discharge Recommendations    Personal Safety/ Management of Symptoms    * Follow your safety plan.  Report increased symptoms to your care team and /or go to the nearest Emergency Department.    * Call crisis lines as needed    [facility] [phone]                [facility] [phone]  [facility] [phone]              [facility] [phone]  [facility] [phone]              [facility] [phone]  [facility] [phone]          National Suicide Prevention [phone]  [facility] [phone]            Suicide Prevention [phone]  [facility] [phone]    Abstinence/Relapse Prevention  * Take all medicines as directed.  Carry a current list of medicines with you.  * Use coping skills: Attend 12-step groups, attend OP tx, regular contact with sponsor, read in the Big Book, work the 12-steps, reside at a sober house, physical health management, prayer and meditation, open and honest communication with loved ones, etc.  * Do not use illicit (street) drugs, controlled substances (narcotics) or alcohol.    Develop/Improve Independent Living/Socialization Skills: Continue to work on healthy relationships.    Community Resources/Supports and Discharge Planning:    Follow up with psychiatrist / main caregiver: N/A at this time   Next visit:     Follow up with your therapist:  N/A at this time  Next visit:     Go to group therapy and / or support groups at: Attend the [facility] OP program (morning track) starting 10/5/18 at 8:30am. Attend 12-step support groups.    See your medical doctor about:  See for any ongoing conditions or health issues that arise.    Other:      Client Signature:_______________________   Date / Time:___________  Staff Signature:________________________   Date /  Time:___________

## 2018-10-04 NOTE — PROGRESS NOTES
Discharge Planning  D) Pt met with counseling staff to discuss discharge planning. Pt will be starting the [facility] OP program on 10/5/18 at 8:30am. Pt will also be living at the [facility] sober [place].   I) Staff facilitated meeting.  A) Pt appears hopeful about attending [facility] and going to his sober house.   P) Prepare for discharge.

## 2018-10-05 NOTE — ADDENDUM NOTE
Encounter addended by: Sebas Rivera LADC on: 10/5/2018  2:07 PM<BR>     Actions taken: Sign clinical note

## 2018-10-05 NOTE — PROGRESS NOTES
[address]  [place] [address]        Mark Berumen, 1965, was admitted for evaluation/treatment of chemical dependency at [facility].  This person took part in these program(s):    ______ The Inpatient Program   ______ The Outpatient Program   __X___ The Lodging Plus Program   ______ Lodging Day Outpatient       Date admitted: 9/6/18  Date discharged: 10/4/18    Type of discharge:   __X___ Satisfactory - completed evaluation / treatment   ______ Discharged without completing   ______ Behavioral discharge   ______ Transferred to another chemical dependency program   ______ Transferred to another type of service   ______ Left against medical advice (AMA) / Eloped       Comments:       Counselor: LAKEISHA Martinez                       Date: 10/4/2018             Time: 12:00 PM

## 2019-03-15 ENCOUNTER — HOSPITAL ENCOUNTER (INPATIENT)
Facility: CLINIC | Age: 54
LOS: 3 days | Discharge: PSYCHIATRIC OR SUBSTANCE USE FACILITY | DRG: 918 | End: 2019-03-18
Attending: INTERNAL MEDICINE | Admitting: INTERNAL MEDICINE
Payer: COMMERCIAL

## 2019-03-15 ENCOUNTER — TRANSFERRED RECORDS (OUTPATIENT)
Dept: HEALTH INFORMATION MANAGEMENT | Facility: CLINIC | Age: 54
End: 2019-03-15

## 2019-03-15 DIAGNOSIS — J45.20 MILD INTERMITTENT ASTHMA, UNCOMPLICATED: ICD-10-CM

## 2019-03-15 DIAGNOSIS — M54.50 CHRONIC LOW BACK PAIN WITHOUT SCIATICA, UNSPECIFIED BACK PAIN LATERALITY: ICD-10-CM

## 2019-03-15 DIAGNOSIS — G89.29 CHRONIC LOW BACK PAIN WITHOUT SCIATICA, UNSPECIFIED BACK PAIN LATERALITY: ICD-10-CM

## 2019-03-15 DIAGNOSIS — F32.A DEPRESSION, UNSPECIFIED DEPRESSION TYPE: Primary | ICD-10-CM

## 2019-03-15 PROBLEM — T40.1X1A HEROIN OVERDOSE (H): Status: ACTIVE | Noted: 2019-03-15

## 2019-03-15 LAB
ALBUMIN SERPL-MCNC: 3.7 G/DL (ref 3.4–5)
ALP SERPL-CCNC: 149 U/L (ref 40–150)
ALT SERPL W P-5'-P-CCNC: 23 U/L (ref 0–70)
ALT SERPL-CCNC: 26 U/L (ref 14–63)
ANION GAP SERPL CALCULATED.3IONS-SCNC: 5 MMOL/L (ref 3–14)
AST SERPL W P-5'-P-CCNC: 12 U/L (ref 0–45)
AST SERPL-CCNC: 17 U/L (ref 15–37)
BILIRUB DIRECT SERPL-MCNC: 0.1 MG/DL (ref 0–0.2)
BILIRUB SERPL-MCNC: 0.6 MG/DL (ref 0.2–1.3)
BUN SERPL-MCNC: 11 MG/DL (ref 7–30)
CALCIUM SERPL-MCNC: 7.8 MG/DL (ref 8.5–10.1)
CHLORIDE SERPL-SCNC: 105 MMOL/L (ref 94–109)
CK SERPL-CCNC: 73 U/L (ref 30–300)
CO2 SERPL-SCNC: 27 MMOL/L (ref 20–32)
CREAT SERPL-MCNC: 0.62 MG/DL (ref 0.66–1.25)
CREAT SERPL-MCNC: 0.9 MG/DL (ref 0.67–1.17)
ERYTHROCYTE [DISTWIDTH] IN BLOOD BY AUTOMATED COUNT: 14.9 % (ref 10–15)
GFR SERPL CREATININE-BSD FRML MDRD: >60 ML/MIN/1.73ME2 (ref 60–150)
GFR SERPL CREATININE-BSD FRML MDRD: >90 ML/MIN/{1.73_M2}
GLUCOSE BLDC GLUCOMTR-MCNC: 130 MG/DL (ref 70–99)
GLUCOSE SERPL-MCNC: 109 MG/DL (ref 70–99)
GLUCOSE SERPL-MCNC: 185 MG/DL (ref 74–100)
HCT VFR BLD AUTO: 41.4 % (ref 40–53)
HGB BLD-MCNC: 13.8 G/DL (ref 13.3–17.7)
LACTATE BLD-SCNC: 0.9 MMOL/L (ref 0.7–2)
LIPASE SERPL-CCNC: 100 U/L (ref 73–393)
MCH RBC QN AUTO: 30.1 PG (ref 26.5–33)
MCHC RBC AUTO-ENTMCNC: 33.3 G/DL (ref 31.5–36.5)
MCV RBC AUTO: 90 FL (ref 78–100)
PLATELET # BLD AUTO: 293 10E9/L (ref 150–450)
POTASSIUM SERPL-SCNC: 4 MMOL/L (ref 3.4–5.3)
POTASSIUM SERPL-SCNC: 4.3 MMOL/L (ref 3.5–5.1)
PROT SERPL-MCNC: 6.8 G/DL (ref 6.8–8.8)
RBC # BLD AUTO: 4.58 10E12/L (ref 4.4–5.9)
SODIUM SERPL-SCNC: 137 MMOL/L (ref 133–144)
TROPONIN I SERPL-MCNC: <0.015 UG/L (ref 0–0.04)
WBC # BLD AUTO: 10.7 10E9/L (ref 4–11)

## 2019-03-15 PROCEDURE — 93005 ELECTROCARDIOGRAM TRACING: CPT

## 2019-03-15 PROCEDURE — 36415 COLL VENOUS BLD VENIPUNCTURE: CPT | Performed by: NURSE PRACTITIONER

## 2019-03-15 PROCEDURE — 84484 ASSAY OF TROPONIN QUANT: CPT | Performed by: NURSE PRACTITIONER

## 2019-03-15 PROCEDURE — 25000132 ZZH RX MED GY IP 250 OP 250 PS 637: Performed by: NURSE PRACTITIONER

## 2019-03-15 PROCEDURE — 83605 ASSAY OF LACTIC ACID: CPT | Performed by: NURSE PRACTITIONER

## 2019-03-15 PROCEDURE — 99223 1ST HOSP IP/OBS HIGH 75: CPT | Mod: AI | Performed by: INTERNAL MEDICINE

## 2019-03-15 PROCEDURE — 83690 ASSAY OF LIPASE: CPT | Performed by: NURSE PRACTITIONER

## 2019-03-15 PROCEDURE — 85027 COMPLETE CBC AUTOMATED: CPT | Performed by: NURSE PRACTITIONER

## 2019-03-15 PROCEDURE — 00000146 ZZHCL STATISTIC GLUCOSE BY METER IP

## 2019-03-15 PROCEDURE — 99207 ZZC APP CREDIT; MD BILLING SHARED VISIT: CPT | Performed by: NURSE PRACTITIONER

## 2019-03-15 PROCEDURE — 25800030 ZZH RX IP 258 OP 636: Performed by: INTERNAL MEDICINE

## 2019-03-15 PROCEDURE — 20000003 ZZH R&B ICU

## 2019-03-15 PROCEDURE — 25000132 ZZH RX MED GY IP 250 OP 250 PS 637: Performed by: INTERNAL MEDICINE

## 2019-03-15 PROCEDURE — 93010 ELECTROCARDIOGRAM REPORT: CPT | Performed by: INTERNAL MEDICINE

## 2019-03-15 PROCEDURE — 80076 HEPATIC FUNCTION PANEL: CPT | Performed by: NURSE PRACTITIONER

## 2019-03-15 PROCEDURE — 82550 ASSAY OF CK (CPK): CPT | Performed by: NURSE PRACTITIONER

## 2019-03-15 PROCEDURE — 80048 BASIC METABOLIC PNL TOTAL CA: CPT | Performed by: NURSE PRACTITIONER

## 2019-03-15 RX ORDER — NALOXONE HYDROCHLORIDE 0.4 MG/ML
.1-.4 INJECTION, SOLUTION INTRAMUSCULAR; INTRAVENOUS; SUBCUTANEOUS
Status: DISCONTINUED | OUTPATIENT
Start: 2019-03-15 | End: 2019-03-18 | Stop reason: HOSPADM

## 2019-03-15 RX ORDER — FOLIC ACID 1 MG/1
1 TABLET ORAL DAILY
Status: DISCONTINUED | OUTPATIENT
Start: 2019-03-15 | End: 2019-03-18 | Stop reason: HOSPADM

## 2019-03-15 RX ORDER — SODIUM CHLORIDE 9 MG/ML
INJECTION, SOLUTION INTRAVENOUS CONTINUOUS
Status: DISCONTINUED | OUTPATIENT
Start: 2019-03-15 | End: 2019-03-16

## 2019-03-15 RX ORDER — NAPROXEN 500 MG/1
500 TABLET ORAL 2 TIMES DAILY WITH MEALS
Status: ON HOLD | COMMUNITY
End: 2019-03-18

## 2019-03-15 RX ORDER — LIDOCAINE 40 MG/G
CREAM TOPICAL
Status: DISCONTINUED | OUTPATIENT
Start: 2019-03-15 | End: 2019-03-16

## 2019-03-15 RX ORDER — IBUPROFEN 400 MG/1
800 TABLET, FILM COATED ORAL EVERY 8 HOURS PRN
Status: DISCONTINUED | OUTPATIENT
Start: 2019-03-15 | End: 2019-03-18 | Stop reason: HOSPADM

## 2019-03-15 RX ORDER — ONDANSETRON 4 MG/1
4 TABLET, ORALLY DISINTEGRATING ORAL EVERY 6 HOURS PRN
Status: DISCONTINUED | OUTPATIENT
Start: 2019-03-15 | End: 2019-03-18 | Stop reason: HOSPADM

## 2019-03-15 RX ORDER — PROCHLORPERAZINE 25 MG
25 SUPPOSITORY, RECTAL RECTAL EVERY 12 HOURS PRN
Status: DISCONTINUED | OUTPATIENT
Start: 2019-03-15 | End: 2019-03-18 | Stop reason: HOSPADM

## 2019-03-15 RX ORDER — PROCHLORPERAZINE MALEATE 5 MG
10 TABLET ORAL EVERY 6 HOURS PRN
Status: DISCONTINUED | OUTPATIENT
Start: 2019-03-15 | End: 2019-03-18 | Stop reason: HOSPADM

## 2019-03-15 RX ORDER — LANOLIN ALCOHOL/MO/W.PET/CERES
100 CREAM (GRAM) TOPICAL DAILY
Status: DISCONTINUED | OUTPATIENT
Start: 2019-03-15 | End: 2019-03-18 | Stop reason: HOSPADM

## 2019-03-15 RX ORDER — LIDOCAINE 40 MG/G
CREAM TOPICAL
Status: DISCONTINUED | OUTPATIENT
Start: 2019-03-15 | End: 2019-03-18 | Stop reason: HOSPADM

## 2019-03-15 RX ORDER — ONDANSETRON 2 MG/ML
4 INJECTION INTRAMUSCULAR; INTRAVENOUS EVERY 6 HOURS PRN
Status: DISCONTINUED | OUTPATIENT
Start: 2019-03-15 | End: 2019-03-18 | Stop reason: HOSPADM

## 2019-03-15 RX ORDER — ALBUTEROL SULFATE 90 UG/1
2 AEROSOL, METERED RESPIRATORY (INHALATION) EVERY 6 HOURS PRN
Status: DISCONTINUED | OUTPATIENT
Start: 2019-03-15 | End: 2019-03-18 | Stop reason: HOSPADM

## 2019-03-15 RX ORDER — AMOXICILLIN 250 MG
1 CAPSULE ORAL 2 TIMES DAILY PRN
Status: DISCONTINUED | OUTPATIENT
Start: 2019-03-15 | End: 2019-03-18 | Stop reason: HOSPADM

## 2019-03-15 RX ORDER — POLYETHYLENE GLYCOL 3350 17 G/17G
17 POWDER, FOR SOLUTION ORAL DAILY PRN
Status: DISCONTINUED | OUTPATIENT
Start: 2019-03-15 | End: 2019-03-18 | Stop reason: HOSPADM

## 2019-03-15 RX ORDER — AMOXICILLIN 250 MG
2 CAPSULE ORAL 2 TIMES DAILY PRN
Status: DISCONTINUED | OUTPATIENT
Start: 2019-03-15 | End: 2019-03-18 | Stop reason: HOSPADM

## 2019-03-15 RX ADMIN — SODIUM CHLORIDE: 9 INJECTION, SOLUTION INTRAVENOUS at 09:42

## 2019-03-15 RX ADMIN — Medication 100 MG: at 12:21

## 2019-03-15 RX ADMIN — IBUPROFEN 800 MG: 400 TABLET ORAL at 17:32

## 2019-03-15 RX ADMIN — FOLIC ACID 1 MG: 1 TABLET ORAL at 12:21

## 2019-03-15 ASSESSMENT — ACTIVITIES OF DAILY LIVING (ADL)
ADLS_ACUITY_SCORE: 12

## 2019-03-15 NOTE — PROGRESS NOTES
[facility] - Suicide Severity Rating Scale Completed? Yes  If yes, what color did the patient score? White

## 2019-03-15 NOTE — PROGRESS NOTES
Pt arrived to [facility] ICU from [facility] ER via EMS. Narcan gtt infusing upon arrival. Pt A&Ox4. Calm and cooperative. Oriented to ICU room. Hospitalist notified of pts arrival.

## 2019-03-15 NOTE — PLAN OF CARE
Pt A&Ox4. Calm and cooperative. VSS on room air. Narcan gtt off since 1030am. Tolerating regular diet well. Plan for psych to see tomorrow. Will cont to monitor.

## 2019-03-15 NOTE — H&P
Admitted:     03/15/2019      CHIEF COMPLAINT:  Unintentional drug overdose.      HISTORY OF PRESENT ILLNESS:  Mr. Berumen is a 54-year-old gentleman with past medical history of asthma severity unspecified, ADD, chronic pain, previously on methadone for chronic neck and back pain after a car fell on him when he was working on it as well as a prior substance abuse.  The patient had 1 prior overdose in 07/2018 where he overdosed on methadone that was not prescribed to him.  He has previously been in chemical dependency treatment, most recently at [facility] completed in 10/2018.  He has had episodes of sobriety from chemical dependency use and his longest has been 8 years. The patient reports that he has had ongoing abdominal pain for the last 3 weeks as well as some chronic low back pain that has been his usual back pain unrelieved with Naprosyn.  He describes the pain as 8/10, sharp and shooting in his lower back.      Because of uncontrolled back pain, patient snorted heroin.  This is only the second time in about 6 weeks that he has done this.  He thinks he was found by his girlfriend about 4:30 in the morning.  EMS reports that girlfriend found him unresponsive on the floor at approximately 3:40 a.m.  He had agonal respirations she reports feeling only once about every 20seconds with a blue gray appearance.  She was directed to start CPR by the  though she was able to detect a pulse.  CPR was continued for approximately 10-15 minutes by first responders and he was also bagged valve mask ventilated and received 2 doses of intranasal Narcan and achieved ROSC in the field prior to his arrival at [address].  Who are triplets the patient just reports he was not sure what happened, but he woke up in the EMS rig.  He thinks that he must have overdosed on the heroin.  He describes that as unintentional and denies suicide attempt and he was really just trying to treat the pain.  He is quite  ashamed how the event has transpired.  The patient's girlfriend was at home at the time of the event as were his 7-year-old children who are triplets.  He reports some chest discomfort now since the girlfriend had done CPR on him and reports his usual abdominal pain which has been ongoing for at least 3 weeks and is described as dull constant without any aggravating or relieving factors and no relationship to oral intake or BMs.  There has been no diarrhea, nausea or melena.  He describes the pain as being in his mid abdomen and wraps around to his back.  He is certainly concerned about pancreatic etiology given his father was  from pancreatic cancer and a sister with pancreatitis.        In the Emergency Department at [facility], laboratory data were largely unremarkable except for an alkaline phosphatase of 159.  Sodium was 144, potassium 4.3, chloride 103, bicarbonate 28, BUN 11, creatinine 0.9, , ALT 26, albumin 2.4.  Troponin is less than 0.017 nanograms per mL.  Alcohol was 0.08.  Aspirin was less than 2.8 and acetaminophen was less than 2.  Lactic acid is 2.6.  UA was unremarkable.  Urine drug screen is positive for morphine like substances.  Electrocardiogram showed normal sinus rhythm, normal axis and no ischemic changes.  He received 1 liter of normal saline and was started on a Narcan infusion.  The patient was ultimately transferred to [facility] ICU for further evaluation and management.      PAST MEDICAL HISTORY:   1.  Asthma, unspecified.   2.  Attention deficit disorder.   3.  Chronic pain after a car fell on him when he was working on it several years ago had some residual bulging disk.   4.  Arthritis, not otherwise specified.   5.  Substance abuse with prior methadone overdose.      PAST SURGICAL HISTORY:   1.  Right total knee arthroplasty.   2.  Right hip total arthroplasty.   3.  Melanoma resection on his abdomen.   4.  Spinal fusion.      ALLERGIES:  NONE.      SOCIAL HISTORY:   The patient receives primary care at [facility] in [place].  He has not seen them for approximately a year.  He has 5 children, 2 are older and 7-year old triplets.  He smokes 4 cigarettes a day, down from 1 pack a day, smoking since age 17.  Has been drinking approximately 3 beers on a daily basis for the last 1 month.  Currently lives in [place].  He is , resides with his girlfriend, Erinn.       FAMILY HISTORY:  Father  at age 78 of pancreatic cancer.  Mother alive with dementia, a sister with pancreatitis that is alcohol related.        OUTPATIENT MEDICATIONS:    Medications Prior to Admission   Medication Sig Dispense Refill Last Dose     albuterol (PROAIR HFA/PROVENTIL HFA/VENTOLIN HFA) 108 (90 Base) MCG/ACT inhaler Inhale 2 puffs into the lungs every 6 hours as needed for shortness of breath / dyspnea or wheezing 1 Inhaler 1 More Than A Month at      naproxen (NAPROSYN) 500 MG tablet Take 500 mg by mouth 2 times daily (with meals)   Past Week at        REVIEW OF SYSTEMS:  A 10-point review of systems negative aside from what is described in the HPI.      PHYSICAL EXAMINATION:   GENERAL:  A middle-aged man, appears stated age without acute distress.   Neuro: +follows commands wiggle toes and show 2 fingers bilat, face symmetric, tongue midline, speech fluent  HEENT: NC/AT, pupils equal round 3mm briskly reactive bilat, sclera nonicteric, noninjected, conjunctiva pink, mouth moist oral mucosa  Neck: supple  Heart: S1S2  Lungs: CTAB upper lobes, few rales in RLL  Abd:normoactive bowel sounds, soft, nondisteded, pain on palpation in RUQ and epigastrum  Ext: no edema    ASSESSMENT AND PLAN:  Mr. Berumen is a 54-year-old gentleman with past medical history of asthma severity unspecified, ADHD, chronic back pain uncontrolled after a traumatic injury with residual arthritis, substance abuse who has been admitted to the Intensive Care Unit for Narcan infusion in the setting of an  unintentional heroin overdose, which he describes as being taken for pain control.      PROBLEM LIST:   Heroin overdose, unintentional.  Denies suicide attempt.  The motivation for heroin was for analgesia.   ADD.  -- Continue Narcan infusion with attempts to taper to off.   -- Psychiatric consultation for chemical dependency.   -- Will recheck a urine drug screen.   -- The patient is cooperative with treatment.  He has been informed that we could potentially place a 72-hour hold if he attempts to leave.   -- check CK      Daily alcohol use, 3 beers a day for the last 1 month.   -- We will start thiamine and folic acid supplementation.    --Will hold CIWA protocol for now and monitor for any withdrawal symptoms.     Near respiratory arrest, treated as Out-of-hospital cardiopulmonary arrest though never was actually pulseless by girlfriends report.  CPR for approximately 10-15 minutes by his girlfriend and first responders.  Electrocardiogram without any acute changes from the Emergency Department.  Neurologic function appears preserved.    -- Will recheck electrocardiogram.   -- Will cycle troponins.   -- Will repeat lactic acid level.  --Monitor for any signs of aspiration pneumonitis/pneumonia.  He has some abnormal breath sounds in the right lower lobe but has adequate oxygenation on room air so hold off on any imaging or antibiotics     Abdominal pain, ongoing for the last 3 weeks, constant dull pain in the mid abdomen, wrapping around to the back.   -- Will recheck LFTs.   -- We will check lipase.   -- We will start the patient on scheduled bowel regimen.   -- The patient may have a regular diet.    -- We will hold off on imaging for now unless there are any abnormalities in his laboratory workup.  The patient has been counseled that he may need to pursue additional workup with his primary provider in the outpatient setting.     Asthma, unspecified severity.   -- Will order p.r.n. albuterol.     Chronic back  pain.    -- The patient has been counseled that upon treatment here, he will likely need to follow up with his primary provider to establish a more safe analgesia plan.     Peripheral IV x 2.       Prophylaxis.   -- PCDs.     Code status:    -- Discussed with the patient.  He desires full resuscitative measures.        Total time:  40 minutes with 23 mintues of face-to-face time.  Remainder spent in counseling and coordination of care.        The patient will be independently evaluated by Dr. Leatha Feliciano.         LEATHA FELICIANO DO       As dictated by LAURA PINK, CNP            D: 03/15/2019   T: 03/15/2019   MT: TIN      Name:     LIV MACKAY   MRN:      [id number]        Account:      [id number]   :      1965        Admitted:     03/15/2019                   Document: [id number]

## 2019-03-15 NOTE — PHARMACY-ADMISSION MEDICATION HISTORY
Admission medication history interview status for the 3/15/2019  admission is complete. See EPIC admission navigator for prior to admission medications     Medication history source reliability:Good    Actions taken by pharmacist (provider contacted, etc): Contacted [facility] pharmacy to confirm patient medications.      Additional medication history information not noted on PTA med list :None    Medication reconciliation/reorder completed by provider prior to medication history? No    Time spent in this activity: 10    Prior to Admission medications    Medication Sig Last Dose Taking? Auth Provider   albuterol (PROAIR HFA/PROVENTIL HFA/VENTOLIN HFA) 108 (90 Base) MCG/ACT inhaler Inhale 2 puffs into the lungs every 6 hours as needed for shortness of breath / dyspnea or wheezing More Than A Month at na Yes Liza Rodriguez APRN CNP   naproxen (NAPROSYN) 500 MG tablet Take 500 mg by mouth 2 times daily (with meals) Past Week at na Yes Unknown, Entered By History

## 2019-03-16 PROCEDURE — H0001 ALCOHOL AND/OR DRUG ASSESS: HCPCS

## 2019-03-16 PROCEDURE — 99232 SBSQ HOSP IP/OBS MODERATE 35: CPT | Performed by: INTERNAL MEDICINE

## 2019-03-16 PROCEDURE — 12000000 ZZH R&B MED SURG/OB

## 2019-03-16 PROCEDURE — 25000132 ZZH RX MED GY IP 250 OP 250 PS 637: Performed by: INTERNAL MEDICINE

## 2019-03-16 PROCEDURE — 99222 1ST HOSP IP/OBS MODERATE 55: CPT | Performed by: PSYCHIATRY & NEUROLOGY

## 2019-03-16 PROCEDURE — 25000132 ZZH RX MED GY IP 250 OP 250 PS 637: Performed by: PSYCHIATRY & NEUROLOGY

## 2019-03-16 PROCEDURE — 25000132 ZZH RX MED GY IP 250 OP 250 PS 637: Performed by: NURSE PRACTITIONER

## 2019-03-16 RX ADMIN — Medication 100 MG: at 09:48

## 2019-03-16 RX ADMIN — IBUPROFEN 800 MG: 400 TABLET ORAL at 08:26

## 2019-03-16 RX ADMIN — VORTIOXETINE 5 MG: 5 TABLET, FILM COATED ORAL at 19:02

## 2019-03-16 RX ADMIN — IBUPROFEN 800 MG: 400 TABLET ORAL at 19:03

## 2019-03-16 RX ADMIN — FOLIC ACID 1 MG: 1 TABLET ORAL at 09:48

## 2019-03-16 ASSESSMENT — ACTIVITIES OF DAILY LIVING (ADL)
ADLS_ACUITY_SCORE: 12

## 2019-03-16 NOTE — PROGRESS NOTES
ICU Multi-Disciplinary Note  Patient condition reviewed.  54 y/old heroin toxicity.  Required narcan gtt.  Uneventful night. Hospitalists primary.   The Critical Care service will continue to follow peripherally while the patient is within the ICU. We are readily available should issues arise. Please feel free to contact us for critical care issues with which we may be of assistance. For all other concerns, please contact primary service first.     Shayla Ramos

## 2019-03-16 NOTE — CONSULTS
3/16/2019    CD consult completed. Writer will update pt's eval, and reports he wants to go back to . Pt signed a UNIQUE for his  with [facility] and also emergency contact (girlfriend Erinn). Writer will finish pt's eval and refer to [facility]. Gave pt [facility] info, directed him to call Monday morning to see where he is at on the wait list. Explained that pt may be able to get this week at some point. Pt reported that CPS is not allowing him to see his kids at this time as this is his 2nd O.D. In front of them. Pt reported that police filed a report (did not know about SW filing). Pt agreed to plan for treatment at .    Ama Patel, [facility]  [phone]

## 2019-03-16 NOTE — PROGRESS NOTES
Patient is A + O x 4, 95% on room air, sinus rhythm and normotensive. BRV with SBA. Ready to transfer.

## 2019-03-16 NOTE — PROGRESS NOTES
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 you participated in outpatient and/or residential?  Yes     Age 26 [place] clean 5 months  [address] [facility]  Clean for 8 years   [facility] 11/20/10 clean for 2 years  [facility]  clean for 3 years  [place] [facility]  did not complete  [facility] 10/2018 completed     He liked the groups.     4. Support group participation: Have you/do you attend support group meetings to reduce/stop your alcohol/drug use? How recently? What was your experience? Are you willing to restart? If the person has not participated, is he or she willing?     Per assessment in 2018:  He has attended 12 step groups off and on since 26, he like 12 step groups. Last meeting was 1-2 years ago. He like NA best.    Denied current attendance to meetings.     5. What would assist you in staying sober/straight?     Willing to go to inpatient treatment.    Dimension V Ratings   Relapse/Continued Use/Continued problem potential - The placing authority must use the criteria in Dimension V to determine a client s relapse, continued use, and continued problem potential.   RISK DESCRIPTIONS - Severity ratin No awareness of the negative impact of mental health problems or substance abuse. No coping skills to arrest mental health or addiction illnesses, or prevent  relapse.    REASONS SEVERITY WAS ASSIGNED - (What information did the person provide that indicates his or her understanding of relapse issues? What about the person s experience indicates how prone he or she is to relapse? What coping skills does the person have that decrease relapse potential?)      Patient has continued to abuse mood altering substances despite negative consequences in multiple life areas and had 5 or 6 Substance Abuse treatment attempts. Patient reports difficulty with impulse controls and lacks relapse prevention, recovery management skills to arrest substance abuse. His inadequately treated psychiatric issues increase his risk of relapse I.e. ADD, depression and his chronic pain.  He has a family history of addiction i.e. Father, brother, sister. Patient lacks applying sober coping skills and relapse prevention skills. Patient had about an 8 year period of sobriety in his 30s. Patient reports meetings and sponsor helped during that period.        DIMENSION VI - Recovery Environment   1. Are you employed/attending school? Tell me about that.      at the [employer] in [place]  Worked on Thursday   Use negatively impacted job     2A. Describe a typical day; evening for you. Work, school, social, leisure, volunteer, spiritual practices. Include time spent obtaining, using, recovering from drugs or alcohol. (DSM)     Unknown.     Please describe what leisure activities have been associated with your substance abuse:     The patient denied having any leisure activities which had been associated with his substance abuse.    2B. How often do you spend more time than you planned using or use more than you planned? (DSM)     Uses more than planned 50% of the time. Used more than planned on 03/15/2019 which lead to overdose.     3. How important is using to your social connections? Do many of your family or friends use?     Not important, friends and family don't use.     4A. Are you  currently in a significant relationship?     Yes.  4B. How long? 8 years         Please describe your significant other's use of mood altering chemicals? She is sober    4C. Sexual Orientation:     Heterosexual    5A. Who do you live with?      Girlfriend and kids    5B. Tell me about their alcohol/drug use and mental health issues.     Patient denies concerns with his kids and girlfriend.     5C. Are you concerned for your safety there? No    5D. Are you concerned about the safety of anyone else who lives with you? No    6A. Do you have children who live with you?     Yes.  (Ask follow-up questions to determine the person's relationship and responsibility, both legal and care giving; and what arrangements are made for supervision for the children when the person is not available.) Pt and girlfriend have 7 year old triplets.    6B. Do you have children who do not live with you?     Yes - has two adult sons from his previous marriage    7A. Who supports you in making changes in your alcohol or drug use? What are they willing to do to support you? Who is upset or angry about you making changes in your alcohol or drug use? How big a problem is this for you?      Sister, girlfriend are supportive     7B. This table is provided to record information about the person s relationships and available support It is not necessary to ask each item; only to get a comprehensive picture of their support system.  How often can you count on the following people when you need someone?   Partner / Spouse Always supportive   Parent(s)/Aunt(s)/Uncle(s)/Grandparents Usually supportive   Sibling(s)/Cousin(s) Always supportive   Child(jennifer) Usually supportive   Other relative(s) The patient doesn't have any current contact with other relatives.   Friend(s)/neighbor(s) Always supportive   Child(jennifer) s father(s)/mother(s) The patient doesn't have any current contact with children(s) mother or father.   Support group member(s) The patient  denied having any current involvement with 12-step or other support group meetings.   Community of ana lilia members The patient denied having any current involvement with community ana lilia members.   /counselor/therapist/healer The patient denied having any current involvement with a , counselor, therapist or healer.   Other (specify) No     8A. What is your current living situation?     Lives with girlfriend and 7 year old triplets.    8B. What is your long term plan for where you will be living?     Patient reports he would like to return home - currently CPS isn't letting him see his kids due to this being a 2nd overdose.    8C. Tell me about your living environment/neighborhood? Ask enough follow up questions to determine safety, criminal activity, availability of alcohol and drugs, supportive or antagonistic to the person making changes.      No concerns.     9. Criminal justice history: Gather current/recent history and any significant history related to substance use--Arrests? Convictions? Circumstances? Alcohol or drug involvement? Sentences? Still on probation or parole? Expectations of the court? Current court order? Any sex offenses - lifetime? What level? (DSM)    3 DWI's, 88, 89, 2011.    Possession 5th degree, 2012.  On probation for DWI in 2018   [facility]     10. What obstacles exist to participating in treatment? (Time off work, childcare, funding, transportation, pending nursing home time, living situation)     The patient denied having any obstacles for participating in substance abuse treatment.    Dimension VI Ratings   Recovery environment - The placing authority must use the criteria in Dimension VI to determine a client s recovery environment.   RISK DESCRIPTIONS - Severity ratin Client is engaged in structured, meaningful activity, but peers, family, significant other, and living environment are unsupportive, or there is criminal justice involvement by the client or  among the client's peers, significant others, or in the client's living environment.    REASONS SEVERITY WAS ASSIGNED - (What support does the person have for making changes? What structure/stability does the person have in his or her daily life that will increase the likelihood that changes can be sustained? What problems exist in the person s environment that will jeopardize getting/staying clean and sober?)     Patient reports he is likely to loose his job due to his use. He lives with his girlfriend who he said has been an active alcoholic, she has been through 6 tx's and now agreed to stop and go to meetings again. Patient reports he has financial pressures and estimates that he has about $30k of debt. He has been a  for 27 years and loves the work. He lacks healthy chemically free leisure activities and interests. He said his home life has been consumed with helping to take care of 7 year old triplets. Patient currently does not have a valid 's licence. Patient has past and current legals, specifically a DWI in 11/2018. There is currently CPS involvement as police and SW at [facility] filed reports since he overdosed with his kids in the home for a second time.        Client Choice/Exceptions   Would you like services specific to language, age, gender, culture, Zoroastrianism preference, race, ethnicity, sexual orientation or disability?  No    What particular treatment choices and options would you like to have? None    Do you have a preference for a particular treatment program? None    Criteria for Diagnosis     Criteria for Diagnosis  DSM-5 Criteria for Substance Use Disorder  Instructions: Determine whether the client currently meets the criteria for Substance Use Disorder using the diagnostic criteria in the DSM-V [id number].[phone]. Current means during the most recent 12 months outside a facility that controls access to substances    Category of Substance Severity (ICD-10 Code / DSM 5 Code)     Alcohol  Use Disorder Severe  (10.20) (303.90)   Cannabis Use Disorder Moderate  (F12.20) (304.30)   Hallucinogen Use Disorder The patient does not currently meet the criteria for a Hallucinogen use disorder, but has a history of using when he was younger.   Inhalant Use Disorder The patient does not meet the criteria for an Inhalant use disorder.   Opioid Use Disorder Severe   (F11.20) (304.00)   Sedative, Hypnotic, or Anxiolytic Use Disorder The patient does not meet the criteria for a Sedative/Hypnotic use disorder.   Stimulant Related Disorder Moderate   (F14.20) (304.20) Cocaine - in remission   Tobacco Use Disorder Moderate   (F17.200) (305.1)   Other (or unknown) Substance Use Disorder The patient does not meet the criteria for a Other (or unknown) Substance use disorder.       Collateral Contact Summary   Number of contacts made: none    Contact with referring person:  Yes    If court related records were reviewed, summarize here: No court records had been reviewed at the time of this documentation.    Information from collateral contacts supported/largely agreed with information from the client and associated risk ratings.    Rule 25 Assessment Summary and Plan   's Recommendation    1). Attend co-occurring inpatient treatment at [facility] or similar [facility].   2). Follow all subsequent recommendations of the substance use treatment providers.   3). Abstain from alcohol and all mood-altering substances, except as prescribed. Take all medications as prescribed.   4). Attend NA at least twice weekly and obtain a male sponsor for additional sober supports.   5). Become involved in a daily sober recreational activity/hobby of his own interest.  6). Have a mental health evaluation to determine accurate diagnoses and which psychotropic medications would be effective.   7). [facility] all conditions [facility].     Collateral Contacts     Name:    [facility]   Relationship:    Medical records   Phone  Number:    none Releases:    Yes     Patient's chart was reviewed, specifically notes from current hospitalization, hospitalization in 07/2018 and assessment with Enrrique Jones in 09/2018.   ollateral Contacts      A problematic pattern of alcohol/drug use leading to clinically significant impairment or distress, as manifested by at least two of the following, occurring within a 12-month period:    1.) Alcohol/drug is often taken in larger amounts or over a longer period than was intended.  2.) There is a persistent desire or unsuccessful efforts to cut down or control alcohol/drug use  3.) A great deal of time is spent in activities necessary to obtain alcohol, use alcohol, or recover from its effects.  4.) Craving, or a strong desire or urge to use alcohol/drug  5.) Recurrent alcohol/drug use resulting in a failure to fulfill major role obligations at work, school or home.  6.) Continued alcohol use despite having persistent or recurrent social or interpersonal problems caused or exacerbated by the effects of alcohol/drug.  7.) Important social, occupational, or recreational activities are given up or reduced because of alcohol/drug use.  8.) Recurrent alcohol/drug use in situations in which it is physically hazardous.  9.) Alcohol/drug use is continued despite knowledge of having a persistent or recurrent physical or psychological problem that is likely to have been caused or exacerbated by alcohol.  10.) Tolerance, as defined by either of the following: A need for markedly increased amounts of alcohol/drug to achieve intoxication or desired effect.  11.) Withdrawal, as manifested by either of the following: The characteristic withdrawal syndrome for alcohol/drug (refer to Criteria A and B of the criteria set for alcohol/drug withdrawal). and Alcohol/drug (or a closely related substance, such as a benzodiazepine) is taken to relieve or avoid withdrawal symptoms.      Specify if: In early remission:  After full  criteria for alcohol/drug use disorder were previously met, none of the criteria for alcohol/drug use disorder have been met for at least 3 months but for less than 12 months (with the exception that Criterion A4,  Craving or a strong desire or urge to use alcohol/drug  may be met).     In sustained remission:   After full criteria for alcohol use disorder were previously met, none of the criteria for alcohol/drug use disorder have been met at any time during a period of 12 months or longer (with the exception that Criterion A4,  Craving or strong desire or urge to use alcohol/drug  may be met).   Specify if:   This additional specifier is used if the individual is in an environment where access to alcohol is restricted.    Mild: Presence of 2-3 symptoms  Moderate: Presence of 4-5 symptoms  Severe: Presence of 6 or more symptoms

## 2019-03-16 NOTE — PROGRESS NOTES
[facility]    Hospitalist Progress Note    Assessment & Plan   Mark Berumen is a 54 year old male with PMHx of polysubstance abuse including opioids and alcohol, chronic pain, asthma and ADHD who was admitted on 3/15/2019 after overdosing on heroin, which he states he used in attempts to help manage his chronic pain. Found by his significant other in near respiratory arrest.     Heroin Overdose  Hx of polysubstance abuse  Suffered near respiratory arrest after injecting heroin. States he used heroin bc he was trying to manage his back pain. Found by significant other. Given Narcan with improvement. Ultimately placed on narcan gtt and admitted to ICU at [facility]. Clinically improved. Weaned off narcan gtt on 3/15 AM. Remains stable no s/sx of withdrawal.   -- clinically stable  -- patient agreeable to stay until arrangements for treatment can be made  -- psych consult pending  -- appreciate input from chem dep    Daily EtOH Use:  No s/sx of withdrawal observed thus far    Chronic Back Pain:  Due to an old injury. Typically uses Naproxen.  -- has ibuprofen prn    Asthma:  Chronic and stable. No concerns at present    FEN: no IVFs, lytes stable, regular diet  DVT Prophylaxis: encourage ambulation  Code Status: Full Code    Disposition: Transfer out of ICU today. ?keep hospitalized until placement can be arranged, await psych recs. Patient agreeable to remaining hospitalized as he states he wouldn't be able to return home given current orders from CPS that he cannot see his children (this was the second time he overdosed in their presence).     Leatha Hugo    Interval History   Feeling okay today. No specific complaints. Ibuprofen working for pain. Was made aware of CPS report and that he will be unable to return home and see his kids.    -Data reviewed today: I reviewed all new labs and imaging results over the last 24 hours. I personally reviewed no images or EKG's today.    Physical Exam    Temp: 98.6  F (37  C) Temp src: Oral BP: 146/84 Pulse: 70 Heart Rate: 76 Resp: 12 SpO2: 96 % O2 Device: None (Room air)    There were no vitals filed for this visit.  Vital Signs with Ranges  Temp:  [98  F (36.7  C)-98.6  F (37  C)] 98.6  F (37  C)  Pulse:  [60-86] 70  Heart Rate:  [60-90] 76  Resp:  [8-28] 12  BP: (108-166)/(74-97) 146/84  SpO2:  [95 %-96 %] 96 %  I/O last 3 completed shifts:  In: 2019 [P.O.:1540; I.V.:479]  Out: -     Constitutional: Resting comfortably, alert and answering questions appropriately, NAD  Respiratory: CTAB, no wheeze/rales/rhonchi, no increased work of breathing  Cardiovascular: HRRR, no MGR, no LE edema  GI: S, NT, ND, +BS  Skin/Integumen: warm/dry  Other:      Medications       folic acid  1 mg Oral Daily     sodium chloride (PF)  3 mL Intracatheter Q8H     vitamin B1  100 mg Oral Daily       Data   Recent Labs   Lab 03/15/19  1809 03/15/19  1426 03/15/19  1008   WBC  --   --  10.7   HGB  --   --  13.8   MCV  --   --  90   PLT  --   --  293   NA  --   --  137   POTASSIUM  --   --  4.0   CHLORIDE  --   --  105   CO2  --   --  27   BUN  --   --  11   CR  --   --  0.62*   ANIONGAP  --   --  5   TABATHA  --   --  7.8*   GLC  --   --  109*   ALBUMIN  --   --  3.7   PROTTOTAL  --   --  6.8   BILITOTAL  --   --  0.6   ALKPHOS  --   --  149   ALT  --   --  23   AST  --   --  12   LIPASE  --   --  100   TROPI <0.015 <0.015 <0.015       No results found for this or any previous visit (from the past 24 hour(s)).

## 2019-03-16 NOTE — CONSULTS
SHAZIA  I: SHAZIA was consulted to file CPS report regarding patient's heroin overdose with his 7year old triplets at home. Report was made to [facility]. Information was faxed.     P: SHAZIA will continue to follow and assist as needed.    Nicole Smith, SAM   *[phone]

## 2019-03-16 NOTE — PROGRESS NOTES
[facility]  [address] [place] [id number]      ADULT CD ASSESSMENT ADDENDUM      Patient Name: Mark Berumen  Cell Phone:   Home: [phone] (home)    Mobile:   Telephone Information:   Mobile [phone]       Email:  [email or web address]  Emergency Contact: Erinn Shay, girlfriend   Tel: [phone]    The patient reported being:  Single, in a serious relationship    With which race do you identify? White    Initial Screening Questions     1. Are you currently having severe withdrawal symptoms that are putting yourself or others in danger?  No    2. Are you currently having severe medical problems that require immediate attention?  No    3. Are you currently having severe emotional or behavioral problems that are putting yourself or others at risk of harm?  No    4. Do you have sufficient reading skills that will enable you to understand written materials, including the program rules and client rights materials?  Yes     Family History and other additional information     Who raised you? (parents, grandparents, adoptive parents, step-parents, etc.)    Both Parents    Please tell me what it was like growing up in your family. (please include any history of substance abuse, mental health issues, emotional/physical/sexual abuse, forms of discipline, and support)     He grew up in [facility], raised in [place] until 15, then [place]. He said he had a happy childhood. His dad was a heavy drinker but quit on his own.   He felt supported growing up by: 60%, He was the youngest of 4 so felt forgotten.   Siblings: He was the youngest of 4 siblings, he had 2 brothers (one 57, the oldest overdosed in 2017) 1 sister (55).   Family CD hx: father paternal grandfather, brother, sister.   Family MI hx: mom had depression  Emotional Abuse: none  Physical Abuse: none  Sexual abuse: none  Discipline growing up was: normal, spankings.    Do you have any children or Stepchildren? Yes, explain: two  "adult sons, 7 year old triplets    Are you being investigated by Child Protection Services? Yes, explain: CPS involved since pt overdosed while children were home (2nd time).    Do you have a child protection worker, probation office or ?  Yes, explain: on probation with [facility] [facility] for DWI in 11/2018    How would you describe your current finances?  In serious debt    If you are having problems, (unpaid bills, bankruptcy, IRS problems) please explain:  Yes, explain: lots of debt, unpaid bills    If working or a student are you able to function appropriately in that setting? Yes     Describe your preferred learning style:  by reading, by hands-on practice and by watching someone else demonstrate    What are your some of your personal strengths?  \"easy to get along with, humble\"    Do you currently participate in community ana lilia activities, such as attending Yazdanism, temple, Yarsani or Catholic services?  The patient denied currently being involved in any community ana lilia activities.    How does your spirituality impact your recovery?  Yeah, it is important     Do you currently self-administer your medications?  Yes    Have you ever had to lie to people important to you about how much you webster?   No   Have you ever felt the need to bet more and more money?   No   Have you ever attempted treatment for a gambling problem?   No   Have you ever touched or fondled someone else inappropriately or forced them to have sex with you against their will?   No   Are you or have you ever been a registered sex offender?   No   Is there any history of sexual abuse in your family? No   Have you ever felt obsessed by your sexual behavior, such as having sex with many partners, masturbating often, using pornography often?   No     Have you ever received therapy or stayed in the hospital for mental health problems?   Yes, explain: Past summer, [facility]     Have you ever hurt yourself, such as cutting, burning or " hitting yourself?   No     Have you ever purged, binged or restricted yourself as a way to control your weight?   No     Are you on a special diet?   No     Do you have any concerns regarding your nutritional status?   No     Have you had any appetite changes in the last 3 months?   No   Have you had weight loss or weight gain of more than 10 lbs in the last 3 months?   If patient gained or lost more than 10 lbs, then refer to program RN / attending Physician for assessment.   No   Was the patient informed of BMI?     No   Have you engaged in any risk-taking behavior that would put you at risk for exposure to blood-borne or sexually transmitted diseases?   No   Do you have any dental problems?   No   Have you ever lived through any trauma or stressful life events?   Yes, explain: Death of brother about 2 years ago (they were best friends he still hasn't dealt with it), suicide death of best friend when pt was 20, death of father 2014       In the past month, have you had any of the following symptoms related to the trauma listed above? (dreams, intense memories, flashbacks, physical reactions, etc.)   No   Have you ever believed people were spying on you, or that someone was plotting against you or trying to hurt you?   No   Have you ever believed someone was reading your mind or could hear your thoughts or that you could actually read someone's mind or hear what another person was thinking?   No   Have you ever believed that someone of some force outside of yourself was putting thoughts into your mind or made you act in a way that was not your usual self?  Have you ever though you were possessed?   No   Have you ever believed you were being sent special messages through the TV, radio or newspaper?   No   Have you ever heard things other people couldn't hear, such as voices or other noises?   No   Have you ever had visions when you were awake?  Or have you ever seen things other people couldn't see?   No   Do you  have a valid 's license?    No, explain: revoked currently from DWI     PHQ-9, MAHENDRA-7 and Suicide Risk Assessment   PHQ-9 on 3/16/2019 MAHENDRA-7 on 3/16/2019   The patient's PHQ-9 score was see psych notes   The patient's MAHENDRA-7 score was see psych notes       [place]-Suicide Severity Rating Scale   Suicide Ideation   1.) Have you ever wished you were dead or that you could go to sleep and not wake up?     Lifetime:  No   Past Month:  No     2.) Have you actually had any thoughts of killing yourself?   Lifetime:  No   Past Month:  No     3.) Have you been thinking about how you might do this?     Lifetime:  No   Past Month:  No     4.) Have you had these thoughts and had some intention of acting on them?     Lifetime:  No   Past Month:  No     5.) Have you started to work out the details of how to kill yourself?   Lifetime:  No   Past Month:  No     6.) Do you intend to carry out this plan?      Lifetime:  No   Past Month:  No     Intensity of Ideation   Intensity of ideation (1 being least severe, 5 being most severe):     Lifetime:  The patient denied ever having any suicidal thoughts in life.   Past Month:  The patient denied ever having any suicidal thoughts in life.     How often do you have these thoughts?  The patient denied having any suicidal thoughts within the past month.     When you have the thoughts how long do they last?  The patient denied having any suicidal thoughts within the past month.     Can you stop thinking about killing yourself or wanting to die if you want to?  The patient denied having any suicidal thoughts within the past month.     Are there things - anyone or anything (i.e. family, Yarsanism, pain of death) that stopped you from wanting to die or acting on thoughts of suicide?  Does not apply     What sort of reasons did you have for thinking about wanting to die or killing yourself (ie end pain, stop how you were feeling, get attention or reaction, revenge)?  Does not apply      Suicidal Behavior   (Suicide Attempt) - Have you made a suicide attempt?     Lifetime:  The patient had never made a suicide attempt.   Past Month:  The patient had never made a suicide attempt.     Have you engaged in self-harm (non-suicidal self-injury)?  The patient denied having any history of engaging in self-harm (non-suicidal self-injury).     (Interrupted Attempt) - Has there been a time when you started to do something to end your life but someone or something stopped you before you actually did anything?  No     (Aborted or Self-Interrupted Attempt) - Has there been a time when you started to do something to try to end your life but you stopped yourself before you actually did anything?  No     (Preparatory Acts of Behavior) - Have you taken any steps towards making suicide attempt or preparing to kill yourself (such as collecting pills, getting a gun, giving valuables away or writing a suicide note)?  No     Actual Lethality/Medical Damage:  The patient denied ever making a suicidal attempt.       2008  [employer] [facility]  Used with permission by Joana Belcher, PhD.       Guide to C-SSRS Risk Ratings   NO IDEATION:  with no active thoughts IDEATION: with a wish to die. IDEATION: with active thoughts. Risk Ratings   If Yes No No 0 - Very Low Risk   If NA Yes No 1 - Low Risk   If NA Yes Yes 2 - Low/moderate risk   IDEATION: associated thoughts of methods without intent or plan INTENT: Intent to follow through on suicide PLAN: Plan to follow through on suicide Risk Ratings cont...   If Yes No No 3 - Moderate Risk   If Yes Yes No 4 - High Risk   If Yes Yes Yes 5 - High Risk   The patient's ADDITIONAL RISK FACTORS and lack of PROTECTIVE FACTORS may increase their overall suicide risk ratings.     Additional Risk Factors:    Someone close to the patient (family member/friend) completed a suicide     Significant history of untreated or poorly treated chronic pain issues     A  "recent loss that was significant to the patient, i.e. loss of job, loss of home, divorce, break-up, etc.     A triggering event(s) leading to humiliation, shame or despair     History of impulsive or aggressive behaviors   Protective Factors:    Having people in his/her life that would prevent the patient from considering a suicide attempt (i.e. young children, spouse, parents, etc.)     Having easy access to supportive family members     Having restricted access to highly lethal means of suicide     Risk Status   Past month:0. - Very Low Risk:  Evaluation Counselors:  Document in Epic / SmartFocusAR to counselor \"Very Low Risk\".      Treatment Counselors:  Reassess upon admission as applicable, assess weekly in progress notes under Dimension 3 and summarize in Discharge / Treatment summary under Dimension 3.    Past 24 hours:0. - Very Low Risk:  Evaluation Counselors:  Document in Epic / SmartFocusAR to counselor \"Very Low Risk\".      Treatment Counselors:  Reassess upon admission as applicable, assess weekly in progress notes under Dimension 3 and summarize in Discharge / Treatment summary under Dimension 3.   Additional information to support suicide risk rating: There was no additional information to provide at this time.     Mental Health Status   Physical Appearance/Attire: Appears stated age   Hygiene: well groomed   Eye Contact: at examiner   Speech Rate:  regular   Speech Volume: regular   Speech Quality: fluid   Cognitive/Perceptual:  reality based   Cognition: memory intact    Judgment: intact and able to concentrate   Insight: intact and able to concentrate   Orientation:  time, place, person and situation   Thought: logical    Hallucinations:  none   General Behavioral Tone: cooperative   Psychomotor Activity: no problem noted   Gait:  Unknown pt laying in hospital bed   Mood: appropriate   Affect: congruence/appropriate   Counselor Notes: NA     Criteria for Diagnosis: DSM-5 Criteria for Substance Use Disorders  "     Alcohol Use Disorder Severe - 303.90 (F10.20)  Cannabis Use Disorder Moderate - 304.30 (F12.20)  Opioid Use Disorder Severe - 304.00 (F11.20)  Cocaine Use Disorder Moderate - 304.20 (F14.20), in remission  Tobacco Use Disorder Moderate - 305.10 (F17.200)  Depression NOS, per patient self-report  ADHD, per patient self-report    Level of Care   I.) Intoxication and Withdrawal: 0   II.) Biomedical:  1   III.) Emotional and Behavioral:  2   IV.) Readiness to Change:  2   V.) Relapse Potential: 4   VI.) Recovery Environmental: 2     Initial Problem List     The patient lacks relapse prevention skills  The patient has poor coping skills  The patient lacks a sober peer support network  The patient has dual issues of MI and CD  The patient lacks the ability to effectively manage his/her mental health issues  The patient has a significant history of grief and loss issues  The patient has a significant history of guilt and shame issues  The patient has current legal issues  The patient has current child protection and/or child custody issues    Patient/Client is willing to follow treatment recommendations.  Yes    Counselor: LAKEISHA Salas    Vulnerable Adult Checklist for LODGING:     This LODGING patient, or other Residential/Lodging CD Treatment patient is a categorical Vulnerable Adult according to [place] Statute 626.5572 subdivision 21.    Susceptibility to abuse by others     1.  Have you ever been emotionally abused by anyone?          No    2.  Have you ever been bullied, or physically assaulted by anyone?        No    3.  Have you ever been sexually taken advantage of or sexually assaulted?        No    4.  Have you ever been financially taken advantage of?        No    5.  Have you ever hurt yourself intentionally such as burns or cuts?       No    Risk of abusing other vulnerable adults     1.  Have you ever bullied, berated or emotionally degraded someone else?       No    2.  Have you ever  financially taken advantage of someone else?       No    3.  Have you ever sexually exploited or assaulted another person?       No    4.  Have you ever gotten into fights, verbal arguments or physically assaulted someone?          No    Based on the above information:    This [facility] patient, or other Residential/Lodging CD Treatment patient is a categorical Vulnerable Adult according to [facility] Statue 626.5572 subdivision 21.

## 2019-03-16 NOTE — CONSULTS
Initial Psychiatric Consult: Time Spent: 55 Minutes  Augusto Joiner MD.     Referral to [facility] in [place].

## 2019-03-17 PROCEDURE — 25000132 ZZH RX MED GY IP 250 OP 250 PS 637: Performed by: NURSE PRACTITIONER

## 2019-03-17 PROCEDURE — 12000000 ZZH R&B MED SURG/OB

## 2019-03-17 PROCEDURE — 25000132 ZZH RX MED GY IP 250 OP 250 PS 637: Performed by: PSYCHIATRY & NEUROLOGY

## 2019-03-17 PROCEDURE — 99231 SBSQ HOSP IP/OBS SF/LOW 25: CPT | Performed by: INTERNAL MEDICINE

## 2019-03-17 PROCEDURE — 25000132 ZZH RX MED GY IP 250 OP 250 PS 637: Performed by: INTERNAL MEDICINE

## 2019-03-17 PROCEDURE — 99232 SBSQ HOSP IP/OBS MODERATE 35: CPT | Performed by: PSYCHIATRY & NEUROLOGY

## 2019-03-17 RX ADMIN — IBUPROFEN 800 MG: 400 TABLET ORAL at 15:54

## 2019-03-17 RX ADMIN — IBUPROFEN 800 MG: 400 TABLET ORAL at 08:15

## 2019-03-17 RX ADMIN — Medication 100 MG: at 08:15

## 2019-03-17 RX ADMIN — VORTIOXETINE 5 MG: 5 TABLET, FILM COATED ORAL at 08:14

## 2019-03-17 RX ADMIN — FOLIC ACID 1 MG: 1 TABLET ORAL at 08:15

## 2019-03-17 ASSESSMENT — ACTIVITIES OF DAILY LIVING (ADL)
ADLS_ACUITY_SCORE: 12

## 2019-03-17 NOTE — PLAN OF CARE
Pt A&Ox4. VSS. On RA. Good appetite and oral intake. Voiding appropriately. Independent in room. Ibuprofen given for chronic back pain from prev injury. Some generalized chest discomfort from CPR. Ice packs offered. Pt agreeable to plan and saw Psych and Chem Dep in the ICU before transfer. Calls as needed. Rounded on freq.

## 2019-03-17 NOTE — PLAN OF CARE
Care Plan Summary Note:  ORIENTATION/BEHAVIOR: A&Ox4, pleasant, calm and cooperative  ABNL VS/O2: VSS on RA  MOBILITY/FALL RISK: independent in the room  PAIN MANAGMENT: ibuprofen for chronic back pain and rib pain from CPR  DIET: regular  BOWEL/BLADDER: continent, ambulates independently to bathroom to void  ABNL LAB/BG:  DRAIN/DEVICES:  SKIN: WNL  TESTS/PROCEDURES:  AGGRESSION TOOL COLOR: Green  D/C DAY/GOALS/PLACE: psych consult today. Discharge to rehab pending.  OTHER:
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
## 2019-03-17 NOTE — PLAN OF CARE
1971-1221: Pt A/Ox4, VSS on RA. Pt denies N/V and SOB. Reports lower back pain, PRN Ibuprofen administered, pt found relief. Continues to have abdominal discomfort, passing flatus, abdomen soft/nontender during palpation. +BS in all four quadrants. Adequate output. LS clear bilaterally. PIV x 2 SL. Regular diet, tolerating. Independent in room. Discharge pending. Psy/Chem Dep following. Will continue to monitor.

## 2019-03-17 NOTE — CONSULTS
[facility] Follow Up Psychiatric Consult Note      TIME SPENT IN PSYCHIATRY FOLLOW UP CONSULT: 15 MINUTES    Consult ordered by: Leatha Hugo DO  Reason: Heroin overdose, history of polysubstance abuse      Initial History   The patient's care was discussed, patient seen and chart notes were reviewed.    Patient examined for psychiatric consultation.     IDENTIFICATION  Pt is a 54 year old male. Pt sees PCP Dr. Lucy Escalera-Primary, Physician. Pt seen on 3/16/19 by Dr. Joiner for an initial psychiatric consultation.     HISTORY OF PRESENT ILLNESS.     The patient did not utilize his Seroquel prn but states that he slept well throughout the night. He was started on Trintellix 5mg and tolerated the medication well. He reports only mild grogginess. The patient exhibits as less depressed and anxious. He is calm, focused and alert. He demonstrates an appropriate inrease energy, motivation, concentration and focus. He is not agitated or irritable. He denies SI or a plan of action. The patient is will to go to chemical dependency treatment a [facility].      Medications     Medications Prior to Admission   Medication Sig Dispense Refill Last Dose     albuterol (PROAIR HFA/PROVENTIL HFA/VENTOLIN HFA) 108 (90 Base) MCG/ACT inhaler Inhale 2 puffs into the lungs every 6 hours as needed for shortness of breath / dyspnea or wheezing 1 Inhaler 1 More Than A Month at      naproxen (NAPROSYN) 500 MG tablet Take 500 mg by mouth 2 times daily (with meals)   Past Week at        Scheduled Medications    folic acid  1 mg Oral Daily     sodium chloride (PF)  3 mL Intracatheter Q8H     vitamin B1  100 mg Oral Daily     vortioxetine  5 mg Oral Daily     PRNs:  albuterol, ibuprofen, lidocaine 4%, melatonin, naloxone, ondansetron **OR** ondansetron, polyethylene glycol, prochlorperazine **OR** prochlorperazine **OR** prochlorperazine, senna-docusate **OR** senna-docusate,  sodium chloride (PF)      Allergies      No Known Allergies     Previous Medical History     Past Medical History:   Diagnosis Date     ADD (attention deficit disorder)      Arthritis      Chronic pain      Substance abuse      Uncomplicated asthma         Medical Review of Systems     /79 (BP Location: Right arm)   Pulse 60   Temp 98.2  F (36.8  C) (Oral)   Resp 16   SpO2 95%   There is no height or weight on file to calculate BMI.    Previous 10-point ROS completed by Alexandra SANCHEZ on 3/15/19 reviewed by Augusto Joiner MD on March 16, 2019 and is unchanged except for those problems mentioned within the HPI.      Mental Status Examination     Appearance Lying in bed, dressed in gown. Appears stated age.   Attitude Cooperative   Orientation Oriented to person, place, time   Eye Contact Normal   Speech Regular rate, rhythm, volume and tone   Language Normal   Psychomotor Behavior Normal   Thought Process Goal-Oriented, Intact   Associations Intact   Thought Content Patient is currently negative for suicidal ideation, negative for plan or intent, able to contract no self harm and identify barriers to suicide.  Negative for obsessions, compulsions or psychosis.     Mood Less depressed, less anxious   Affect Intact   Fund of Knowledge Intact   Insight Intact   Judgement Intact   Attention Span & Concentration Alert   Recent & Remote Memory Intact   Gait Not tested   Muscle Tone Intact      Labs     Labs reviewed.  No results found for this or any previous visit (from the past 24 hour(s)).     Impression     This is a 54 year old male who initially presented at [facility] after a heroin overdose. He has a history significant for depression and anxiety. Additionally, he has had several chemical dependency treatments. The patient was started on Trintellix 5mg and exhibits as less depressed and less anxious. His energy and motivation have improved. He is not agitated or irritable. He denies SI or  a plan of action. The patient notes that the only side effects he experienced was mild grogginess.  Will adjust Trintellix 5mg to be taken at night due to reported grogginess. The patient is in agreement. Continue all other medication as prescribed with no change. Additionally, the patient is in agreement to start chemical dependency treatment at [facility] once discharged from the hospital. The medication Vivitrol was discussed with the patient, including the benefits that this provide a patient with a history of substance abuse. Vivitrol would suffice as a deterent from opiates. Will plan to start the Vivitrol IM once he is discharged from the hospital.      Diagnoses     1. Major depression, recurrent, severe.    2.  Alcohol use disorder  3. Opiate use disorder  4. Attention deficit with hyperactivity disorder, inattentive type.        Plan     1. Explained side effects, benefits and complications of medications to the patient.  2. Medication Changes: Adjust Trintellix 5mg to be taken at night. Continue all other medication with no change.   3. Discussed treatment plan with patient and team.  4. Re-consult Psychiatry.   5. Plan on admission to [facility] and start Vivitrol.       TIME SPENT IN PSYCHIATRY FOLLOW UP CONSULT: 15 MINUTES     Attestation:   Patient has been seen and evaluated by me, Augusto Joiner MD.    Patient ID:  Name: Mark Berumen MRN: [id number]  Admission: 3/15/2019 YOB: 1965

## 2019-03-17 NOTE — PROGRESS NOTES
[facility]    Hospitalist Progress Note    Assessment & Plan   Mark Berumen is a 54 year old male with PMHx of polysubstance abuse including opioids and alcohol, chronic pain, asthma and ADHD who was admitted on 3/15/2019 after overdosing on heroin, which he states he used in attempts to help manage his chronic pain. Found by his significant other in near respiratory arrest.     Heroin Overdose  Hx of polysubstance abuse  Depression, ADHD  Suffered near respiratory arrest after injecting heroin. States he used heroin bc he was trying to manage his back pain. Found by significant other. Given Narcan with improvement. Ultimately placed on narcan gtt and admitted to ICU at [facility]. Clinically improved. Weaned off narcan gtt on 3/15 AM. Remains stable no s/sx of withdrawal.   -- clinically stable  -- psych following -- meds adjusted on 3/17 (Trintellex and prn Seroquel added)  -- appreciate input from chem dep  -- patient agreeable to residential treatment stay -- psych looking into [facility] in [place]    Daily EtOH Use:  No s/sx of withdrawal observed thus far    Chronic Back Pain:  Due to an old injury. Typically uses Naproxen.  -- has ibuprofen prn    Asthma:  Chronic and stable. No concerns at present    FEN: no IVFs, lytes stable, regular diet  DVT Prophylaxis: encourage ambulation  Code Status: Full Code    Disposition: Will need treatment stay for substance abuse. Psych/SW helping to coordinate. Reassess options/availabilities in AM. Discharge once safe plan can be arranged, date not yet clear.    Leatha Hugo    Interval History   Feeling okay today. No specific complaints other than feeling a little tired. chronic pain well managed.     -Data reviewed today: I reviewed all new labs and imaging results over the last 24 hours. I personally reviewed no images or EKG's today.    Physical Exam   Temp: 97.8  F (36.6  C) Temp src: Oral BP: 113/74 Pulse: 61 Heart Rate: 61 Resp: 16  SpO2: 95 % O2 Device: None (Room air)    There were no vitals filed for this visit.  Vital Signs with Ranges  Temp:  [97.6  F (36.4  C)-98.6  F (37  C)] 97.8  F (36.6  C)  Pulse:  [60-86] 61  Heart Rate:  [61-90] 61  Resp:  [11-28] 16  BP: (113-166)/(74-97) 113/74  SpO2:  [95 %-97 %] 95 %  I/O last 3 completed shifts:  In: 300 [P.O.:300]  Out: -     Constitutional: Resting comfortably, alert and answering questions appropriately, NAD  Respiratory: CTAB, no wheeze/rales/rhonchi, no increased work of breathing  Cardiovascular: HRRR, no MGR, no LE edema  GI: S, NT, ND, +BS  Skin/Integumen: warm/dry  Other:      Medications       folic acid  1 mg Oral Daily     sodium chloride (PF)  3 mL Intracatheter Q8H     vitamin B1  100 mg Oral Daily     vortioxetine  5 mg Oral Daily       Data   Recent Labs   Lab 03/15/19  1809 03/15/19  1426 03/15/19  1008   WBC  --   --  10.7   HGB  --   --  13.8   MCV  --   --  90   PLT  --   --  293   NA  --   --  137   POTASSIUM  --   --  4.0   CHLORIDE  --   --  105   CO2  --   --  27   BUN  --   --  11   CR  --   --  0.62*   ANIONGAP  --   --  5   TABATHA  --   --  7.8*   GLC  --   --  109*   ALBUMIN  --   --  3.7   PROTTOTAL  --   --  6.8   BILITOTAL  --   --  0.6   ALKPHOS  --   --  149   ALT  --   --  23   AST  --   --  12   LIPASE  --   --  100   TROPI <0.015 <0.015 <0.015       No results found for this or any previous visit (from the past 24 hour(s)).

## 2019-03-18 VITALS
RESPIRATION RATE: 18 BRPM | TEMPERATURE: 97.2 F | SYSTOLIC BLOOD PRESSURE: 131 MMHG | OXYGEN SATURATION: 94 % | HEART RATE: 63 BPM | DIASTOLIC BLOOD PRESSURE: 74 MMHG

## 2019-03-18 PROCEDURE — 99231 SBSQ HOSP IP/OBS SF/LOW 25: CPT | Performed by: PSYCHIATRY & NEUROLOGY

## 2019-03-18 PROCEDURE — 25000132 ZZH RX MED GY IP 250 OP 250 PS 637: Performed by: INTERNAL MEDICINE

## 2019-03-18 PROCEDURE — 25000132 ZZH RX MED GY IP 250 OP 250 PS 637: Performed by: NURSE PRACTITIONER

## 2019-03-18 PROCEDURE — 99238 HOSP IP/OBS DSCHRG MGMT 30/<: CPT | Performed by: INTERNAL MEDICINE

## 2019-03-18 RX ORDER — ALBUTEROL SULFATE 90 UG/1
2 AEROSOL, METERED RESPIRATORY (INHALATION) EVERY 6 HOURS PRN
Qty: 6.7 G | Refills: 0 | Status: ON HOLD | OUTPATIENT
Start: 2019-03-18 | End: 2021-10-05

## 2019-03-18 RX ORDER — NAPROXEN 500 MG/1
500 TABLET ORAL 2 TIMES DAILY WITH MEALS
Qty: 60 TABLET | Refills: 0 | Status: SHIPPED | OUTPATIENT
Start: 2019-03-18 | End: 2021-09-02

## 2019-03-18 RX ADMIN — IBUPROFEN 800 MG: 400 TABLET ORAL at 09:00

## 2019-03-18 RX ADMIN — Medication 100 MG: at 09:00

## 2019-03-18 RX ADMIN — IBUPROFEN 800 MG: 400 TABLET ORAL at 00:35

## 2019-03-18 RX ADMIN — FOLIC ACID 1 MG: 1 TABLET ORAL at 09:00

## 2019-03-18 ASSESSMENT — ACTIVITIES OF DAILY LIVING (ADL)
ADLS_ACUITY_SCORE: 12

## 2019-03-18 NOTE — CONSULTS
[facility] Follow Up Psychiatric Consult Note        Consult ordered by: Leatha Hugo DO  Reason: Heroin overdose, history of polysubstance abuse     Interval History   The patient's care was discussed, patient seen and chart notes were reviewed.  I met with the patient on station 66.  He was pleasant and cordial during her visit.  He slept okay.  He denies any safety concerns.  He expressed interest in pursuing chemical dependency treatment.  His current dose of Trintellex is 5mg and will be dosed at bedtime due to complaints of sedation.  At this point it looks like moving him to treatment at [facility] will require getting his rule 25 evaluation faxed to there so they can approve admission.  The goal would be to get him directly into treatment.  The patrol has been discussed but this is something that can be done on an outpatient basis.       Medications     Medications Prior to Admission   Medication Sig Dispense Refill Last Dose     albuterol (PROAIR HFA/PROVENTIL HFA/VENTOLIN HFA) 108 (90 Base) MCG/ACT inhaler Inhale 2 puffs into the lungs every 6 hours as needed for shortness of breath / dyspnea or wheezing 1 Inhaler 1 More Than A Month at      naproxen (NAPROSYN) 500 MG tablet Take 500 mg by mouth 2 times daily (with meals)   Past Week at        Scheduled Medications    folic acid  1 mg Oral Daily     sodium chloride (PF)  3 mL Intracatheter Q8H     vitamin B1  100 mg Oral Daily     vortioxetine  5 mg Oral At Bedtime     PRNs:  albuterol, ibuprofen, lidocaine 4%, melatonin, naloxone, ondansetron **OR** ondansetron, polyethylene glycol, prochlorperazine **OR** prochlorperazine **OR** prochlorperazine, senna-docusate **OR** senna-docusate, sodium chloride (PF)      Allergies      No Known Allergies     Previous Medical History     Past Medical History:   Diagnosis Date     ADD (attention deficit disorder)      Arthritis      Chronic pain      Substance abuse      Uncomplicated asthma          Medical Review of Systems     /83 (BP Location: Right arm)   Pulse 63   Temp 97.6  F (36.4  C) (Oral)   Resp 16   SpO2 95%   There is no height or weight on file to calculate BMI.    Previous 10-point ROS completed by Alexandra SANCHEZ on 3/15/19 reviewed by Mark Clement MD on March 16, 2019 and is unchanged except for those problems mentioned within the HPI.      Mental Status Examination     Appearance Lying in bed, dressed in gown. Appears stated age.   Attitude Cooperative   Orientation Oriented to person, place, time   Eye Contact Normal   Speech Regular rate, rhythm, volume and tone   Language Normal   Psychomotor Behavior Normal   Thought Process Goal-Oriented, Intact   Associations Intact   Thought Content Patient is currently negative for suicidal ideation, negative for plan or intent, able to contract no self harm and identify barriers to suicide.  Negative for obsessions, compulsions or psychosis.     Mood Less depressed, less anxious   Affect Intact   Fund of Knowledge Intact   Insight Intact   Judgement Intact   Attention Span & Concentration Alert   Recent & Remote Memory Intact   Gait Not tested   Muscle Tone Intact      Labs     Labs reviewed.  No results found for this or any previous visit (from the past 24 hour(s)).     Impression     This is a 54 year old male who initially presented at [facility] after a heroin overdose. He has a history significant for depression and anxiety.  Transfer to residential tx is indicated when medically cleared     Diagnoses     1. Major depression, recurrent, severe.    2.  Alcohol use disorder  3. Opiate use disorder  4. Attention deficit with hyperactivity disorder, inattentive type.        Plan     1. Explained side effects, benefits and complications of medications to the patient.  2. Medication Changes: none-continue Trintellix 5mg to be taken at night. Continue all other medication with no change.   3.         Plan on  admission to [facility] and start Vivitrol as OP-SW will need to coordinate getting rule 25 which was completed a few wks agosent to [facility]. Dr. Joiner is in House this AM on station 77 if further questions arise about the transfer process there      Attestation:   Patient has been seen and evaluated by me, Mark Clement MD.    Patient ID:  Name: Mark Berumen MRN: [id number]  Admission: 3/15/2019 YOB: 1965
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
## 2019-03-18 NOTE — DISCHARGE INSTRUCTIONS
You will be admitting to [facility] at [address] [place] [address] in [place].  The phone number for the building is [phone]

## 2019-03-18 NOTE — PLAN OF CARE
Pt A&Ox4. VSS. On RA. Some c/o of rib discomfort from CPR. Good appetite and oral intake. Voiding appropriately. Up independently. Agreeable to current plan, and pt stated that he was seen by psych again for update of possible discharge tomorrow. Pt showered. IV SL. Calls as needed.

## 2019-03-18 NOTE — PROGRESS NOTES
SW:  D: Patient was assessed by Ama Patel over the weekend and she sent referral to [facility].  Dr Joiner indicated on Sunday that he anticipates [facility] can admit patient today.  Writer has a call out to Jovany in admissions at [facility], [phone], to confirm their ability to accept patient today.

## 2019-03-18 NOTE — PROGRESS NOTES
SW:  D:  Patient accepted today by [facility] located at [address] [place] [address], general number is [phone].  Patient updated and pleased.  [facility] [employer] [facility] program has scheduled patient to be transported by [employer].  Once patient is dressed and discharge papers reviewed, writer is instructed to call [employer] at [phone] and they will send a taxi to the hospital.  Writer  paged MD to complete discharge to home order set and have 30 days worth of medication filled here to go with patient.  Bedside nurse updated/

## 2019-03-18 NOTE — PLAN OF CARE
Discharge    Patient discharged to [facility] via taxi, ambulated to door #6 with SBA of CNA.    Care plan note:  discharged to treatment center.  Up ad ge.  Tolerated activity and diet.  Pain to rib cage, ibuprofen and ice with some relief.      Listed belongings gathered and returned to patient. Yes  Care Plan and Patient education resolved: Yes  Prescriptions if needed, hard copies sent with patient  Rx's filled and given to patient prior to discharge.   Home and hospital acquired medications returned to patient: NA  Medication Bin checked and emptied on discharge Yes  Follow up appointment made for patient: No

## 2019-03-20 LAB — INTERPRETATION ECG - MUSE: NORMAL

## 2020-12-22 ENCOUNTER — TELEPHONE (OUTPATIENT)
Dept: ORTHOPEDICS | Facility: CLINIC | Age: 55
End: 2020-12-22

## 2020-12-22 NOTE — TELEPHONE ENCOUNTER
Received call from [facility] stating patient is scheduled to have physical therapy with them for a shoulder impingement and they need orders from Dr. Massey.   Informed patient has not ever been seen by Dr. Massey. She states she now sees patient was seen at [facility] and Dr. Massey must be listed as an error. She will check into further.     NERISSA Prado RN

## 2021-06-18 NOTE — CONSULTS
Received consult today on admission, however no H&P or initial eval documented as of yet. Psych to see tomorrow.   

## 2021-09-02 ENCOUNTER — HOSPITAL ENCOUNTER (INPATIENT)
Facility: CLINIC | Age: 56
LOS: 4 days | Discharge: HOME | DRG: 897 | End: 2021-09-06
Attending: EMERGENCY MEDICINE | Admitting: PSYCHIATRY & NEUROLOGY
Payer: COMMERCIAL

## 2021-09-02 ENCOUNTER — TELEPHONE (OUTPATIENT)
Dept: BEHAVIORAL HEALTH | Facility: CLINIC | Age: 56
End: 2021-09-02

## 2021-09-02 DIAGNOSIS — F10.220 ALCOHOL DEPENDENCE WITH UNCOMPLICATED INTOXICATION (H): ICD-10-CM

## 2021-09-02 DIAGNOSIS — F19.20 CHEMICAL DEPENDENCY (H): Primary | ICD-10-CM

## 2021-09-02 DIAGNOSIS — Z11.52 ENCOUNTER FOR SCREENING LABORATORY TESTING FOR COVID-19 VIRUS: ICD-10-CM

## 2021-09-02 LAB
ALBUMIN SERPL-MCNC: 4 G/DL (ref 3.4–5)
ALCOHOL BREATH TEST: 0.22 (ref 0–0.01)
ALP SERPL-CCNC: 97 U/L (ref 40–150)
ALT SERPL W P-5'-P-CCNC: 42 U/L (ref 0–70)
ANION GAP SERPL CALCULATED.3IONS-SCNC: 6 MMOL/L (ref 3–14)
AST SERPL W P-5'-P-CCNC: 21 U/L (ref 0–45)
BASOPHILS # BLD AUTO: 0.1 10E3/UL (ref 0–0.2)
BASOPHILS NFR BLD AUTO: 1 %
BILIRUB SERPL-MCNC: 0.4 MG/DL (ref 0.2–1.3)
BUN SERPL-MCNC: 9 MG/DL (ref 7–30)
CALCIUM SERPL-MCNC: 7.8 MG/DL (ref 8.5–10.1)
CHLORIDE BLD-SCNC: 103 MMOL/L (ref 94–109)
CO2 SERPL-SCNC: 28 MMOL/L (ref 20–32)
CREAT SERPL-MCNC: 0.66 MG/DL (ref 0.66–1.25)
EOSINOPHIL # BLD AUTO: 0 10E3/UL (ref 0–0.7)
EOSINOPHIL NFR BLD AUTO: 0 %
ERYTHROCYTE [DISTWIDTH] IN BLOOD BY AUTOMATED COUNT: 14.1 % (ref 10–15)
GFR SERPL CREATININE-BSD FRML MDRD: >90 ML/MIN/1.73M2
GLUCOSE BLD-MCNC: 112 MG/DL (ref 70–99)
HCT VFR BLD AUTO: 45 % (ref 40–53)
HGB BLD-MCNC: 15.8 G/DL (ref 13.3–17.7)
IMM GRANULOCYTES # BLD: 0 10E3/UL
IMM GRANULOCYTES NFR BLD: 0 %
LYMPHOCYTES # BLD AUTO: 2.3 10E3/UL (ref 0.8–5.3)
LYMPHOCYTES NFR BLD AUTO: 29 %
MAGNESIUM SERPL-MCNC: 2.2 MG/DL (ref 1.6–2.3)
MCH RBC QN AUTO: 30.6 PG (ref 26.5–33)
MCHC RBC AUTO-ENTMCNC: 35.1 G/DL (ref 31.5–36.5)
MCV RBC AUTO: 87 FL (ref 78–100)
MONOCYTES # BLD AUTO: 0.2 10E3/UL (ref 0–1.3)
MONOCYTES NFR BLD AUTO: 3 %
NEUTROPHILS # BLD AUTO: 5.2 10E3/UL (ref 1.6–8.3)
NEUTROPHILS NFR BLD AUTO: 67 %
NRBC # BLD AUTO: 0 10E3/UL
NRBC BLD AUTO-RTO: 0 /100
PLATELET # BLD AUTO: 293 10E3/UL (ref 150–450)
POTASSIUM BLD-SCNC: 3.9 MMOL/L (ref 3.4–5.3)
PROT SERPL-MCNC: 7.7 G/DL (ref 6.8–8.8)
RBC # BLD AUTO: 5.17 10E6/UL (ref 4.4–5.9)
SARS-COV-2 RNA RESP QL NAA+PROBE: NEGATIVE
SODIUM SERPL-SCNC: 137 MMOL/L (ref 133–144)
WBC # BLD AUTO: 7.9 10E3/UL (ref 4–11)

## 2021-09-02 PROCEDURE — 128N000004 HC R&B CD ADULT

## 2021-09-02 PROCEDURE — C9803 HOPD COVID-19 SPEC COLLECT: HCPCS | Performed by: EMERGENCY MEDICINE

## 2021-09-02 PROCEDURE — 250N000013 HC RX MED GY IP 250 OP 250 PS 637: Performed by: PSYCHIATRY & NEUROLOGY

## 2021-09-02 PROCEDURE — 83735 ASSAY OF MAGNESIUM: CPT | Performed by: EMERGENCY MEDICINE

## 2021-09-02 PROCEDURE — 85025 COMPLETE CBC W/AUTO DIFF WBC: CPT | Performed by: EMERGENCY MEDICINE

## 2021-09-02 PROCEDURE — 99285 EMERGENCY DEPT VISIT HI MDM: CPT | Performed by: EMERGENCY MEDICINE

## 2021-09-02 PROCEDURE — 82075 ASSAY OF BREATH ETHANOL: CPT | Performed by: EMERGENCY MEDICINE

## 2021-09-02 PROCEDURE — U0005 INFEC AGEN DETEC AMPLI PROBE: HCPCS | Performed by: EMERGENCY MEDICINE

## 2021-09-02 PROCEDURE — 36415 COLL VENOUS BLD VENIPUNCTURE: CPT | Performed by: EMERGENCY MEDICINE

## 2021-09-02 PROCEDURE — 80053 COMPREHEN METABOLIC PANEL: CPT | Performed by: EMERGENCY MEDICINE

## 2021-09-02 PROCEDURE — 250N000011 HC RX IP 250 OP 636: Performed by: PSYCHIATRY & NEUROLOGY

## 2021-09-02 PROCEDURE — 99285 EMERGENCY DEPT VISIT HI MDM: CPT | Mod: 25 | Performed by: EMERGENCY MEDICINE

## 2021-09-02 RX ORDER — ONDANSETRON 4 MG/1
4 TABLET, FILM COATED ORAL EVERY 6 HOURS PRN
Status: DISCONTINUED | OUTPATIENT
Start: 2021-09-02 | End: 2021-09-04

## 2021-09-02 RX ORDER — LANOLIN ALCOHOL/MO/W.PET/CERES
100 CREAM (GRAM) TOPICAL DAILY
Status: DISCONTINUED | OUTPATIENT
Start: 2021-09-02 | End: 2021-09-06 | Stop reason: HOSPADM

## 2021-09-02 RX ORDER — TRAZODONE HYDROCHLORIDE 50 MG/1
50 TABLET, FILM COATED ORAL
Status: DISCONTINUED | OUTPATIENT
Start: 2021-09-02 | End: 2021-09-06 | Stop reason: HOSPADM

## 2021-09-02 RX ORDER — PANTOPRAZOLE SODIUM 40 MG/1
40 TABLET, DELAYED RELEASE ORAL DAILY
Status: DISCONTINUED | OUTPATIENT
Start: 2021-09-03 | End: 2021-09-06 | Stop reason: HOSPADM

## 2021-09-02 RX ORDER — MULTIPLE VITAMINS W/ MINERALS TAB 9MG-400MCG
1 TAB ORAL DAILY
Status: DISCONTINUED | OUTPATIENT
Start: 2021-09-02 | End: 2021-09-06 | Stop reason: HOSPADM

## 2021-09-02 RX ORDER — DIAZEPAM 5 MG
5-20 TABLET ORAL EVERY 30 MIN PRN
Status: DISCONTINUED | OUTPATIENT
Start: 2021-09-02 | End: 2021-09-02

## 2021-09-02 RX ORDER — LOPERAMIDE HCL 2 MG
2 CAPSULE ORAL 4 TIMES DAILY PRN
Status: DISCONTINUED | OUTPATIENT
Start: 2021-09-02 | End: 2021-09-06 | Stop reason: HOSPADM

## 2021-09-02 RX ORDER — ACETAMINOPHEN 325 MG/1
975 TABLET ORAL EVERY 4 HOURS PRN
Status: DISCONTINUED | OUTPATIENT
Start: 2021-09-02 | End: 2021-09-06 | Stop reason: HOSPADM

## 2021-09-02 RX ORDER — FOLIC ACID 1 MG/1
1 TABLET ORAL DAILY
Status: DISCONTINUED | OUTPATIENT
Start: 2021-09-02 | End: 2021-09-06 | Stop reason: HOSPADM

## 2021-09-02 RX ORDER — IBUPROFEN 400 MG/1
800 TABLET, FILM COATED ORAL EVERY 6 HOURS PRN
Status: DISCONTINUED | OUTPATIENT
Start: 2021-09-02 | End: 2021-09-06 | Stop reason: HOSPADM

## 2021-09-02 RX ORDER — AMOXICILLIN 250 MG
1 CAPSULE ORAL 2 TIMES DAILY PRN
Status: DISCONTINUED | OUTPATIENT
Start: 2021-09-02 | End: 2021-09-06 | Stop reason: HOSPADM

## 2021-09-02 RX ORDER — MAGNESIUM HYDROXIDE/ALUMINUM HYDROXICE/SIMETHICONE 120; 1200; 1200 MG/30ML; MG/30ML; MG/30ML
30 SUSPENSION ORAL EVERY 4 HOURS PRN
Status: DISCONTINUED | OUTPATIENT
Start: 2021-09-02 | End: 2021-09-06 | Stop reason: HOSPADM

## 2021-09-02 RX ORDER — HYDROXYZINE HYDROCHLORIDE 25 MG/1
25 TABLET, FILM COATED ORAL EVERY 4 HOURS PRN
Status: DISCONTINUED | OUTPATIENT
Start: 2021-09-02 | End: 2021-09-06 | Stop reason: HOSPADM

## 2021-09-02 RX ORDER — DIAZEPAM 5 MG
5-20 TABLET ORAL EVERY 30 MIN PRN
Status: DISCONTINUED | OUTPATIENT
Start: 2021-09-02 | End: 2021-09-06 | Stop reason: HOSPADM

## 2021-09-02 RX ORDER — PANTOPRAZOLE SODIUM 40 MG/1
40 TABLET, DELAYED RELEASE ORAL DAILY
Status: ON HOLD | COMMUNITY
End: 2021-09-24

## 2021-09-02 RX ORDER — HYDROCODONE BITARTRATE AND ACETAMINOPHEN 10; 325 MG/1; MG/1
1 TABLET ORAL EVERY 4 HOURS PRN
Status: ON HOLD | COMMUNITY
End: 2021-09-06

## 2021-09-02 RX ORDER — ALBUTEROL SULFATE 90 UG/1
2 AEROSOL, METERED RESPIRATORY (INHALATION) EVERY 6 HOURS PRN
Status: DISCONTINUED | OUTPATIENT
Start: 2021-09-02 | End: 2021-09-06 | Stop reason: HOSPADM

## 2021-09-02 RX ADMIN — DIAZEPAM 10 MG: 5 TABLET ORAL at 23:27

## 2021-09-02 RX ADMIN — ONDANSETRON HYDROCHLORIDE 4 MG: 4 TABLET, FILM COATED ORAL at 20:18

## 2021-09-02 RX ADMIN — DIAZEPAM 10 MG: 5 TABLET ORAL at 20:18

## 2021-09-02 ASSESSMENT — ENCOUNTER SYMPTOMS
DYSPHORIC MOOD: 1
DIFFICULTY URINATING: 0
ABDOMINAL PAIN: 0
EYE REDNESS: 0
HEADACHES: 0
WEAKNESS: 0
BACK PAIN: 0
COUGH: 0
VOMITING: 0
NAUSEA: 0
NECK PAIN: 0
FEVER: 0
SLEEP DISTURBANCE: 0
SHORTNESS OF BREATH: 0
SORE THROAT: 0

## 2021-09-02 ASSESSMENT — ACTIVITIES OF DAILY LIVING (ADL)
WALKING_OR_CLIMBING_STAIRS_DIFFICULTY: NO
FALL_HISTORY_WITHIN_LAST_SIX_MONTHS: NO
TOILETING_ISSUES: NO
DIFFICULTY_COMMUNICATING: NO
DOING_ERRANDS_INDEPENDENTLY_DIFFICULTY: NO
NUMBER_OF_TIMES_PATIENT_HAS_FALLEN_WITHIN_LAST_SIX_MONTHS: 0
PATIENT_/_FAMILY_COMMUNICATION_STYLE: SPOKEN LANGUAGE (ENGLISH OR BILINGUAL)
DIFFICULTY_EATING/SWALLOWING: NO
WEAR_GLASSES_OR_BLIND: NO
DRESSING/BATHING_DIFFICULTY: NO
CONCENTRATING,_REMEMBERING_OR_MAKING_DECISIONS_DIFFICULTY: NO

## 2021-09-02 ASSESSMENT — MIFFLIN-ST. JEOR: SCORE: 1934.88

## 2021-09-02 NOTE — ED NOTES
Nurse to nurse report was given back at 1700 by another RN.  Eliecer SPARKS given update and cleared the Pt to come. Pt has been resting eyes closed in the hallway for the past few hours and awakens to voice.  Pt has been stating no new complaints.
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
## 2021-09-02 NOTE — TELEPHONE ENCOUNTER
S: [facility], [place] [facility], 56/M, alcohol detox     B: a six pack white claws and a fifth of fireball, daily, for the last week, JANAK before arrival   Pt reports hx of w/d sz, many years ago   Pt reports tremors for w/d sx   Pt reports dep sx, no SI or acute MH concerns     Medically cleared, eating, drinking, ambulating indep  Patient cleared and ready for behavioral bed placement: Yes   No covid concerns, test processing, pt reports he is vaxxed     A: Voluntary     R: 3A/Lewis   CD admit     547pm - David, on call provider, paged   548pm - David accepts   Pt placed in queue   552pm - unit charge unavil, intake awaiting call back   554pm - unit charge notified, 730pm for report   557pm - ED charge notified via text page

## 2021-09-03 LAB
ALBUMIN SERPL-MCNC: 3.8 G/DL (ref 3.4–5)
ALP SERPL-CCNC: 93 U/L (ref 40–150)
ALT SERPL W P-5'-P-CCNC: 35 U/L (ref 0–70)
ANION GAP SERPL CALCULATED.3IONS-SCNC: 6 MMOL/L (ref 3–14)
AST SERPL W P-5'-P-CCNC: 18 U/L (ref 0–45)
BASOPHILS # BLD AUTO: 0.1 10E3/UL (ref 0–0.2)
BASOPHILS NFR BLD AUTO: 1 %
BILIRUB SERPL-MCNC: 0.9 MG/DL (ref 0.2–1.3)
BUN SERPL-MCNC: 14 MG/DL (ref 7–30)
CALCIUM SERPL-MCNC: 7.9 MG/DL (ref 8.5–10.1)
CHLORIDE BLD-SCNC: 102 MMOL/L (ref 94–109)
CHOLEST SERPL-MCNC: 134 MG/DL
CO2 SERPL-SCNC: 27 MMOL/L (ref 20–32)
CREAT SERPL-MCNC: 0.72 MG/DL (ref 0.66–1.25)
EOSINOPHIL # BLD AUTO: 0.1 10E3/UL (ref 0–0.7)
EOSINOPHIL NFR BLD AUTO: 1 %
ERYTHROCYTE [DISTWIDTH] IN BLOOD BY AUTOMATED COUNT: 14.3 % (ref 10–15)
FASTING STATUS PATIENT QL REPORTED: YES
GFR SERPL CREATININE-BSD FRML MDRD: >90 ML/MIN/1.73M2
GLUCOSE BLD-MCNC: 107 MG/DL (ref 70–99)
HCT VFR BLD AUTO: 43.6 % (ref 40–53)
HDLC SERPL-MCNC: 49 MG/DL
HGB BLD-MCNC: 15.4 G/DL (ref 13.3–17.7)
IMM GRANULOCYTES # BLD: 0 10E3/UL
IMM GRANULOCYTES NFR BLD: 0 %
LDLC SERPL CALC-MCNC: 27 MG/DL
LYMPHOCYTES # BLD AUTO: 2.1 10E3/UL (ref 0.8–5.3)
LYMPHOCYTES NFR BLD AUTO: 18 %
MCH RBC QN AUTO: 31.1 PG (ref 26.5–33)
MCHC RBC AUTO-ENTMCNC: 35.3 G/DL (ref 31.5–36.5)
MCV RBC AUTO: 88 FL (ref 78–100)
MONOCYTES # BLD AUTO: 0.7 10E3/UL (ref 0–1.3)
MONOCYTES NFR BLD AUTO: 6 %
NEUTROPHILS # BLD AUTO: 8.5 10E3/UL (ref 1.6–8.3)
NEUTROPHILS NFR BLD AUTO: 74 %
NONHDLC SERPL-MCNC: 85 MG/DL
NRBC # BLD AUTO: 0 10E3/UL
NRBC BLD AUTO-RTO: 0 /100
PLATELET # BLD AUTO: 275 10E3/UL (ref 150–450)
POTASSIUM BLD-SCNC: 3.9 MMOL/L (ref 3.4–5.3)
PROT SERPL-MCNC: 7.2 G/DL (ref 6.8–8.8)
RBC # BLD AUTO: 4.95 10E6/UL (ref 4.4–5.9)
SODIUM SERPL-SCNC: 135 MMOL/L (ref 133–144)
TRIGL SERPL-MCNC: 289 MG/DL
TSH SERPL DL<=0.005 MIU/L-ACNC: 3.6 MU/L (ref 0.4–4)
WBC # BLD AUTO: 11.5 10E3/UL (ref 4–11)

## 2021-09-03 PROCEDURE — 82465 ASSAY BLD/SERUM CHOLESTEROL: CPT | Performed by: PSYCHIATRY & NEUROLOGY

## 2021-09-03 PROCEDURE — 99223 1ST HOSP IP/OBS HIGH 75: CPT | Mod: AI | Performed by: PSYCHIATRY & NEUROLOGY

## 2021-09-03 PROCEDURE — 250N000013 HC RX MED GY IP 250 OP 250 PS 637: Performed by: PSYCHIATRY & NEUROLOGY

## 2021-09-03 PROCEDURE — 99207 PR CONSULT E&M CHANGED TO SUBSEQUENT LEVEL: CPT | Performed by: PHYSICIAN ASSISTANT

## 2021-09-03 PROCEDURE — 99232 SBSQ HOSP IP/OBS MODERATE 35: CPT | Performed by: PHYSICIAN ASSISTANT

## 2021-09-03 PROCEDURE — 250N000011 HC RX IP 250 OP 636: Performed by: PSYCHIATRY & NEUROLOGY

## 2021-09-03 PROCEDURE — 84443 ASSAY THYROID STIM HORMONE: CPT | Performed by: PSYCHIATRY & NEUROLOGY

## 2021-09-03 PROCEDURE — HZ2ZZZZ DETOXIFICATION SERVICES FOR SUBSTANCE ABUSE TREATMENT: ICD-10-PCS | Performed by: PSYCHIATRY & NEUROLOGY

## 2021-09-03 PROCEDURE — 82040 ASSAY OF SERUM ALBUMIN: CPT | Performed by: PSYCHIATRY & NEUROLOGY

## 2021-09-03 PROCEDURE — 250N000013 HC RX MED GY IP 250 OP 250 PS 637: Performed by: EMERGENCY MEDICINE

## 2021-09-03 PROCEDURE — 36415 COLL VENOUS BLD VENIPUNCTURE: CPT | Performed by: PSYCHIATRY & NEUROLOGY

## 2021-09-03 PROCEDURE — 85025 COMPLETE CBC W/AUTO DIFF WBC: CPT | Performed by: PSYCHIATRY & NEUROLOGY

## 2021-09-03 PROCEDURE — 250N000013 HC RX MED GY IP 250 OP 250 PS 637: Performed by: PHYSICIAN ASSISTANT

## 2021-09-03 PROCEDURE — 128N000004 HC R&B CD ADULT

## 2021-09-03 RX ORDER — CLONIDINE HYDROCHLORIDE 0.1 MG/1
0.1 TABLET ORAL 2 TIMES DAILY PRN
Status: DISCONTINUED | OUTPATIENT
Start: 2021-09-03 | End: 2021-09-06 | Stop reason: HOSPADM

## 2021-09-03 RX ORDER — ALBUTEROL SULFATE 90 UG/1
2 AEROSOL, METERED RESPIRATORY (INHALATION)
Status: DISCONTINUED | OUTPATIENT
Start: 2021-09-03 | End: 2021-09-03

## 2021-09-03 RX ORDER — ALBUTEROL SULFATE 90 UG/1
2 AEROSOL, METERED RESPIRATORY (INHALATION)
Status: DISCONTINUED | OUTPATIENT
Start: 2021-09-03 | End: 2021-09-04

## 2021-09-03 RX ADMIN — DIAZEPAM 10 MG: 5 TABLET ORAL at 08:36

## 2021-09-03 RX ADMIN — DIAZEPAM 10 MG: 5 TABLET ORAL at 06:55

## 2021-09-03 RX ADMIN — ONDANSETRON HYDROCHLORIDE 4 MG: 4 TABLET, FILM COATED ORAL at 02:45

## 2021-09-03 RX ADMIN — DIAZEPAM 10 MG: 5 TABLET ORAL at 03:58

## 2021-09-03 RX ADMIN — DIAZEPAM 10 MG: 5 TABLET ORAL at 20:22

## 2021-09-03 RX ADMIN — ONDANSETRON HYDROCHLORIDE 4 MG: 4 TABLET, FILM COATED ORAL at 13:12

## 2021-09-03 RX ADMIN — VORTIOXETINE 10 MG: 5 TABLET, FILM COATED ORAL at 08:35

## 2021-09-03 RX ADMIN — DIAZEPAM 10 MG: 5 TABLET ORAL at 13:12

## 2021-09-03 RX ADMIN — PANTOPRAZOLE SODIUM 40 MG: 40 TABLET, DELAYED RELEASE ORAL at 08:35

## 2021-09-03 RX ADMIN — THIAMINE HCL TAB 100 MG 100 MG: 100 TAB at 08:35

## 2021-09-03 RX ADMIN — FOLIC ACID 1 MG: 1 TABLET ORAL at 08:35

## 2021-09-03 RX ADMIN — ALBUTEROL SULFATE 2 PUFF: 90 AEROSOL, METERED RESPIRATORY (INHALATION) at 11:22

## 2021-09-03 RX ADMIN — ONDANSETRON HYDROCHLORIDE 4 MG: 4 TABLET, FILM COATED ORAL at 19:15

## 2021-09-03 RX ADMIN — MULTIPLE VITAMINS W/ MINERALS TAB 1 TABLET: TAB at 08:35

## 2021-09-03 RX ADMIN — IBUPROFEN 800 MG: 400 TABLET ORAL at 20:22

## 2021-09-03 RX ADMIN — DIAZEPAM 10 MG: 5 TABLET ORAL at 16:45

## 2021-09-03 RX ADMIN — ALBUTEROL SULFATE 2 PUFF: 90 AEROSOL, METERED RESPIRATORY (INHALATION) at 18:24

## 2021-09-03 RX ADMIN — CLONIDINE HYDROCHLORIDE 0.1 MG: 0.1 TABLET ORAL at 13:16

## 2021-09-03 RX ADMIN — DIAZEPAM 10 MG: 5 TABLET ORAL at 02:45

## 2021-09-03 ASSESSMENT — ACTIVITIES OF DAILY LIVING (ADL)
LAUNDRY: UNABLE TO COMPLETE
DRESS: STREET CLOTHES
ORAL_HYGIENE: INDEPENDENT
HYGIENE/GROOMING: INDEPENDENT

## 2021-09-03 NOTE — PROGRESS NOTES
MSSA score of 9/11, pt received 20mg of valium  for alcohol withdrawal this shift and 4mg of Zofran for nausea. Pt is observed to sleep throughout the noc when not engaged in cares.

## 2021-09-03 NOTE — PROGRESS NOTES
09/02/21 1959   Patient Belongings   Did you bring any home meds/supplements to the hospital?  No   Patient Belongings other (see comments)   Patient Belongings Put in Hospital Secure Location (Security or Locker, etc.) other (see comments)   Belongings Search Yes   Clothing Search Yes   Second Staff Jorge A     MED ROOM  BIN:   Mask  A             Admission:  I am responsible for any personal items that are not sent to the safe or pharmacy.  [facility] is not responsible for loss, theft or damage of any property in my possession.    Signature:  _________________________________ Date: _______  Time: _____                                              Staff Signature:  ____________________________ Date: ________  Time: _____      2nd Staff person, if patient is unable/unwilling to sign:    Signature: ________________________________ Date: ________  Time: _____   Discharge:  [facility] has returned all of my personal belongings:    Signature: _________________________________ Date: ________  Time: _____                                          Staff Signature:  ____________________________ Date: ________  Time: _____
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
## 2021-09-03 NOTE — PLAN OF CARE
Problem: Substance Withdrawal  Goal: Substance Withdrawal  Description: Signs and symptoms of listed problems will be absent or manageable.  Outcome: No Change   1600, MSSA 10 for which pt has received valium 10 mg. P105  pt states he still feels fairly miserable with WD.    nylon/4-0

## 2021-09-03 NOTE — CONSULTS
Internal Medicine Initial Visit      Mark Berumen MRN# [id number]   YOB: 1965 Age: 56 year old   Date of Admission: 9/2/2021  PCP: No Ref-Primary, Physician    Referring Provider: Behavioral Health - Rizwan Hernandez MD  Reason for Visit: General Medical Evaluation          Assessment and Recommendations:   Mark Berumen is a 56 year old male with a history of alcohol use disorder, GERD, asthma and depression who is admitted to station 3A with alcohol withdrawal. Internal Medicine consultation was ordered by Dr. Hernandez for general medical evaluation.       Alcohol use disorder with acute withdrawal:  Drinking alcohol daily for the past week. Reports hx of JEN several years ago. Drinking 1/5 fireball and 6 pack of white claws/day. Last drink 9/2 PTA. Ethanol level 0.22. Denies DTs. [facility] currently 12.   -Management per psychiatry team.   -Agree with MVI, folic acid and thiamine supplementation     Right wrist fracture   Reports falling on his son's scooter last week and was seen Sunday ~8/29 at [address] [place] [address] in [place]. He reports having a wrist fracture to right wrist and has a thumb spica splint on R wrist in place. Was prescribed Norco 10-325mg every 4 hours PRN at that time. He reports having an apt at [facility] on 9/14 with orthopedics provider in [place], [place]. He denies any other injury to wrist since splint placement. Denies paresthesias. NV intact.  - Follow-up as scheduled with Orthpedics provider at [facility] on 9/14 for follow-up  - Tylenol PRN for pain     Mild leukocytosis:  WBC 7.9 on admission -> 11.5 today. No infectious s/sx. Afebrile on exam.   - Trend CBC in AM  - Notify medicine if T >100.4F, or any new infectious signs or symptoms.    Mild hypertriglyceridemia:   Triglyceride level 289. Other lipid panel WNL. Follow-up with PCP for repeat fasting lipid panel. Alcohol cessation and lifestyle modifications discussed.     Elevated blood pressure:   Denies hx of HTN. /108 in  setting of alcohol withdrawal. Asymptomatic.   - Clonidine 0.1mg BID PRN for SBP >170 or DBP >110  - Please notify medicine if SBP >170 or DBP >110 or SBP >140 or DBP >90 when out of withdrawal period  - Will need close follow up with PCP for BP monitoring    GERD:   - Continue PTA Pantoprazole 40mg daily     Depression:   Denies any SI/HI. On Vortioxetine 10mg daily.   - Management per psychiatry team    Asthma:  On PTA Albuterol PRN. Reports using this daily. Reports mild dyspnea, and has scant wheeze on exam. Denies any fever, chills or sputum production. POx 95% on room air.   - Schedule albuterol QID and PRN  - Please notify medicine if febrile >100.4F, worsening dyspnea, or hypoxia - would obtain STAT CXR at that time and broaden work-up  - Follow up with PCP to discuss maintenance inhaler       Medicine will follow-up on BP peripherally, please page with any additional concerns.     Shilpa Castellanos PA-C  Hospitalist Service   Pager: [phone]  7a-6p M-F and 7a-3p weekends/holidays  Otherwise page job code [phone] or [phone] ([facility] and )  Text paging via [employer] is appreciated        Reason for Admission:   Alcohol withdrawal         History of Present Illness:   History is obtained from the patient and medical record.     Mark Berumen is a 56 year old male with a history of alcohol use disorder, GERD, asthma and depression who is admitted to station 3A with alcohol withdrawal. Internal Medicine consultation was ordered by Dr. Hernandez for general medical evaluation.    Patient reports relapsing after 2 years of sobriety. He reports relationship stressors and his wrist fracture as a trigger for relapse. Had been drinking 1/5 fireball and 6 pack of white claw per day for the past 1 week. He reports his withdrawal symptoms including shakiness, sweats and diarrhea, which is not uncommon for him with withdrawal. He reports history of JEN several years ago. Denies DTs. He reports quitting  smoking 1 month ago. Denies other illicit drug use.     He denies hypertension. He reports that he has history of depression but denies SI/HI. He reports that he fell on his son's scooter on Sunday 8/29 and suffered and right wrist fracture. He reports being seen at [address] [place] [address] in [place] and had a thumb spica splint placed. Was prescribed Norco 10-325mg every 4 hours PRN at that time. He reports having an apt at [facility] on 9/14 with orthopedics provider in [place], [place]. He denies any other injury to wrist since splint placement. Denies paresthesias.    He denies chest pain, headache, dizziness, lightheadedness, abdominal pain, melena or hematochezia. He reports history of asthma and he uses an albuterol inhaler once daily. He currently reports mild dyspnea. Denies any fever, chills or sputum production. Denies any other concerns.             Review of Systems:    ROS: 10 point ROS neg other than the symptoms noted above in the HPI.            Past Medical History:   Reviewed and updated in Epic.  Past Medical History:   Diagnosis Date     ADD (attention deficit disorder)      Arthritis      Chronic pain      Substance abuse     Etoh     Uncomplicated asthma              Past Surgical History:   Reviewed and updated in Epic.  Past Surgical History:   Procedure Laterality Date     ABDOMEN SURGERY       BACK SURGERY       BIOPSY      skin cancer melanoma     HEAD & NECK SURGERY       HIP SURGERY       neck fusions       ORTHOPEDIC SURGERY      right total knee replacement     SOFT TISSUE SURGERY               Social History:   Lives with his girlfriend and three children. Works as  and manager.  Social History     Tobacco Use     Smoking status: Current Every Day Smoker     Packs/day: 0.50     Years: 20.00     Pack years: 10.00     Types: Cigarettes     Smokeless tobacco: Never Used   Substance Use Topics     Alcohol use: Yes     Comment: couple drinks/day     Drug use: No             Family History:    Reviewed and updated in Epic.  Family History   Problem Relation Age of Onset     Depression Mother      Substance Abuse Father      Substance Abuse Paternal Grandfather      Substance Abuse Sister      Anxiety Disorder Son      Substance Abuse Brother      Depression Brother      Anxiety Disorder Brother      Substance Abuse Son      Anxiety Disorder Son      Depression Son              Allergies:   No Known Allergies          Medications:     Medications Prior to Admission   Medication Sig Dispense Refill Last Dose     albuterol (PROAIR HFA/PROVENTIL HFA/VENTOLIN HFA) 108 (90 Base) MCG/ACT inhaler Inhale 2 puffs into the lungs every 6 hours as needed for shortness of breath / dyspnea or wheezing 6.7 g 0 9/1/2021     HYDROcodone-acetaminophen (NORCO)  MG per tablet Take 1 tablet by mouth every 4 hours as needed for severe pain   9/1/2021     pantoprazole (PROTONIX) 40 MG EC tablet Take 40 mg by mouth daily   9/1/2021     vortioxetine (TRINTELLIX) 10 MG tablet Take 10 mg by mouth daily   9/1/2021        Current Facility-Administered Medications   Medication     0.9% sodium chloride BOLUS     acetaminophen (TYLENOL) tablet 975 mg     albuterol (PROAIR HFA/PROVENTIL HFA/VENTOLIN HFA) 108 (90 Base) MCG/ACT inhaler 2 puff     alum & mag hydroxide-simethicone (MAALOX) suspension 30 mL     diazepam (VALIUM) tablet 5-20 mg     folic acid (FOLVITE) tablet 1 mg     hydrOXYzine (ATARAX) tablet 25 mg     ibuprofen (ADVIL/MOTRIN) tablet 800 mg     loperamide (IMODIUM) capsule 2 mg     multivitamin w/minerals (THERA-VIT-M) tablet 1 tablet     ondansetron (ZOFRAN) tablet 4 mg     pantoprazole (PROTONIX) EC tablet 40 mg     senna-docusate (SENOKOT-S/PERICOLACE) 8.6-50 MG per tablet 1 tablet     thiamine (B-1) tablet 100 mg     traZODone (DESYREL) tablet 50 mg     vortioxetine (TRINTELLIX) tablet 10 mg            Physical Exam:   Blood pressure (!) 163/108, pulse 117, temperature 97.7  F (36.5  C), temperature source  "Temporal, resp. rate 16, height 1.778 m (5' 10\"), weight 108.9 kg (240 lb), SpO2 95 %.  Body mass index is 34.44 kg/m .  GENERAL: Alert and oriented x 3. NAD. Pleasant. Answering questions appropriately. Healed scabs on face.   HEENT: Anicteric sclera. Mucous membranes moist.   CV: RRR. S1, S2. No murmurs appreciated.   RESPIRATORY: Effort normal on room air. Lungs with scant wheeze at lung bases, no rales or rhonchi. No accessory muscle use. Speaking in full sentences with ease.   GI: Abdomen soft, non distended, non tender. No guarding, rigidity or rebound tenderness.  MUSCULOSKELETAL: No joint swelling or tenderness.  NEUROLOGICAL: No focal deficits. Moves all extremities.   EXTREMITIES: No peripheral edema. Intact bilateral pedal pulses. Right wrist in thumb spica splint. Cap refill <2sec. Sensation intact. No edema.   SKIN: No jaundice. No rashes.           Data:   CBC:  Recent Labs   Lab Test 09/03/21  0732   WBC 11.5*   RBC 4.95   HGB 15.4   HCT 43.6   MCV 88   MCH 31.1   MCHC 35.3   RDW 14.3          CMP:  Recent Labs   Lab Test 09/03/21  0732      POTASSIUM 3.9   CHLORIDE 102   TABATHA 7.9*   CO2 27   BUN 14   CR 0.72   *   AST 18   ALT 35   BILITOTAL 0.9   ALBUMIN 3.8   PROTTOTAL 7.2   ALKPHOS 93       TSH:  TSH   Date Value Ref Range Status   09/03/2021 3.60 0.40 - 4.00 mU/L Final   03/11/2012 1.82 0.4 - 5.0 mU/L Final       Tox screen: needs to be collected    Unresulted Labs Ordered in the Past 30 Days of this Admission     No orders found for last 31 day(s).             "

## 2021-09-03 NOTE — PLAN OF CARE
"  Problem: Substance Withdrawal  Goal: Substance Withdrawal  Description: Signs and symptoms of listed problems will be absent or manageable.  Outcome: Declining   ADMIT:B:   This pt arrived tonight at 1950 who states he drinks a six pack of white claws and a fifth of fireball, daily, for the last week, JANAK before arrival. \"I fell off my son's scooter and broke my right arm so I started to drink again\". Pt states he will see an orthopedic surgeon on sept 14th.   Pt reports hx of w/d sz, many years ago   Pt reports tremors for w/d sx   Pt reports dep sx, no SI or acute MH concerns .  2015, MSSA was 8 tonight so pt received 10 mg valium po and zofran 4 mg for nausea.  Pt settled on the unit and went to  bed after receiving his medication.     "

## 2021-09-03 NOTE — PROGRESS NOTES
Met with Pt for discharge planning.  Pt is requesting referral to [facility].  Coached Pt to complete paperwork for assessment and referral.  Pt has [facility].

## 2021-09-03 NOTE — PROGRESS NOTES
[place] Prescription Monitoring Program   Mark Berumen, 56MPowered by Narx[facility]  Narx Report  Currently Selected  Resources  Date: 09/03/2021Download PDF  Mark Berumen  Risk Indicators  NARX SCORES  Narcotic  080  Sedative  050  Stimulant  000  Explanation and GuidanceOVERDOSE RISK SCORE 200  (Range 000-999)  Explanation and Guidance  ADDITIONAL RISK INDICATORS ( 0 )  Explanation and GuidanceThis [facility] report is based on search criteria supplied and the data entered by the dispensing pharmacy. For more information about any prescription, please contact the dispensing pharmacy or the prescriber. [facility] scores and reports are intended to aid, not replace, medical decision making. None of the information presented should be used as sole justification for providing or refusing to provide medications. The information on this report is not warranted as accurate or complete.  Graphs  RX GRAPH   Narcotic  Buprenorphine  Sedative  Stimulant  Other  All Prescribers  Prescribers  2 - Otis Ontiveros,  1 - Ciro Godwin,  Timeline  09/03  2m  6m  1y  2y  Buprenorphine mg  16  4  0  28  Timeline  09/03  2m  6m  1y  2y  Morphine MgEq (MME)  200  80  0  320  Timeline  09/03  2m  6m  1y  2y  Lorazepam MgEq (LME)  10  2  0  18  Timeline  09/03  2m  6m  1y  2y  *Per CDC guidance, the MME conversion factors prescribed or provided as part of the medication-assisted treatment for opioid use disorder should not be used to benchmark against dosage thresholds meant for opioids prescribed for pain. Buprenorphine products have no agreed upon morphine equivalency, and as partial opioid agonists, are not expected to be associated with overdose risk in the same dose-dependent manner as doses for full agonist opioids. MME = morphine milligram equivalents. LME = Lorazepam milligram equivalents. mg = dose in milligrams.  Summary  Summary  Total Prescriptions:  10  Total Prescribers:  2  Total Pharmacies:  2  Narcotics* (excluding  Buprenorphine)  Current Qty:  0  Current MME/day:  0.00  30 Day Avg MME/day:  1.67  Sedatives*  Current Qty:  0  Current LME/day:  0.00  30 Day Avg LME/day:  0.00  Buprenorphine*  Current Qty:  0  Current mg/day:  0.00  30 Day Avg mg/day:  0.00  Rx Data  PRESCRIPTIONS  Total Prescriptions: 10  Total Private Pay: 0  Fill Date ID Written Sold Drug Qty Days Prescriber Rx # Pharmacy Refill Daily Dose * Pymt Type   08/29/2021 1 08/28/2021 08/29/2021 Hydrocodone-Acetamin 5-325 Mg  10.00 2 Ma Her [id number] [facility] (1273) 0/0 25.00 MME Medicaid [place]  04/15/2021 4 01/26/2021 04/15/2021 Gabapentin 300 Mg Capsule  60.00 30 Mi Brian [id number] [facility] ([id number]) 2/2  Medicaid [place]  03/18/2021 3 01/26/2021 03/18/2021 Gabapentin 300 Mg Capsule  60.00 30 Mi Brian [id number] [facility] ([id number]) 1/2  [place]icaid [place]  02/23/2021 2 02/15/2021 02/23/2021 Gabapentin 300 Mg Capsule  60.00 30 Mi Brian [id number] [facility] ([id number]) 0/0  [place]icaid [place]  02/01/2021 2 01/26/2021 02/01/2021 Gabapentin 300 Mg Capsule  60.00 30 Mi Brian [id number] [facility] ([id number]) 0/2  [place]icaid [place]  01/11/2021 2 11/04/2020 01/11/2021 Gabapentin 300 Mg Capsule  60.00 30 Mi Brian [id number] [facility] ([id number]) 2/2  [place]icaid [place]  12/14/2020 2 11/04/2020 12/14/2020 Gabapentin 300 Mg Capsule  60.00 30 Mi Brian [id number] [facility] ([id number]) 1/2  [place]icaid [place]  11/16/2020 2 11/04/2020 11/16/2020 Gabapentin 300 Mg Capsule  60.00 30 Mi Brian [id number] [facility] ([id number]) 0/2  [place]  10/20/2020 2 09/09/2020 10/20/2020 Gabapentin 300 Mg Capsule  60.00 30 Mi Brian [id number] [facility] ([id number]) 1/1  [place]  09/23/2020 2 09/09/2020 09/23/2020 Gabapentin 300 Mg Capsule  60.00 30 Mi Brian [phone] [facility] (5779) 0/1  [place]  *Per CDC guidance, the MME conversion factors prescribed or provided as part of the medication-assisted treatment for opioid use disorder should not be used to benchmark against dosage thresholds meant for opioids prescribed for pain. Buprenorphine products have no agreed upon morphine equivalency, and as partial opioid agonists, are not expected to be associated with  overdose risk in the same dose-dependent manner as doses for full agonist opioids. MME = morphine milligram equivalents. LME = Lorazepam milligram equivalents. mg = dose in milligrams.  Providers  Total Providers: 2  Name Address [facility] Zipcode Phone  Otis Ontiveros  [address] [facility] [address] [phone]  Ciro Godwin, Aprn, Cnp [address] [place] [address] [phone]  Pharmacies  Total Pharmacies: 2  Name Address [facility] Zipcode Phone  [facility] [phone] [facility] [address] [phone]  [facility] [phone]) [phone] [place] [address] [place] [address] [phone]  The report provided is based upon the search criteria entered and the corresponding data as it has been reported by dispenser(s). If erroneous information is identified or additional information is needed, please contact the dispenser or the prescriber provided on the report. Date Sold signifies the date the prescription was sold (left the pharmacy). The absence of Date Sold does not necessarily indicate the prescription was not dispensed. Fill Date represents the date the medication was filled or prepared by the pharmacy. Note, federal regulation (CFR Title 42: Part 2) requires patient consent prior to releasing certain patient data from federally funded opioid treatment programs (OTPs). As such, controlled substances dispensed from OTPs for medication-assisted treatment may not appear in the [place]  report. Morphine milligram equivalent (MME) conversion factors published by the CDC are used in the MME calculation. Per the CDC, the MME conversion factor is intended only for analytic purposes where prescription data are used to retrospectively calculate daily MME to inform analyses of risks associated with opioid prescribing. This value does not constitute clinical guidance or recommendations for converting patients from one form of opioid analgesic to another. Per the CDC, the conversion  factors for drugs prescribed or provided, as part of medication-assisted treatment for opioid use disorder should not be used to benchmark against MME dosage thresholds meant for opioids prescribed for pain. Buprenorphine products listed in the CDC s MME file do not have an associated conversion factor. Lastly, the [facility] notes, in clinical practice, calculating MME for methadone often involves a sliding-scale approach, whereby the conversion factor increases with increasing dose. The conversion factor of 3 for methadone presented in this file could underestimate MME for a given patient. This report contains confidential information, including patient identifiers, and is not a public record. The information on this report must be treated as protected health information and is only to be disclosed to others as authorized by applicable state and Federal regulations.

## 2021-09-03 NOTE — PHARMACY-ADMISSION MEDICATION HISTORY
Admission Medication History Completed by Pharmacy    See [facility] Admission Navigator for allergy information, preferred outpatient pharmacy, prior to admission medications and immunization status.     Medication History Sources:     Patient reported, [facility]    Changes made to PTA medication list (reason):      Added:    vortioxetine 10mg tablet once daily    pantoprazole 40mg tablet once daily    hyrdocodone-acetaminophen 10-325mg tablet Q4H PRN pain       Deleted:     Naproxen 500mg tablet - not taking       Changed: None    Additional Information:    Patient was  able to reliably report his home medications and last doses      Patient reports usually taking 2 of the hyrdocodone-APAP tablets per day       Medications were verified with [facility] ([phone])      Patient reported taking trazodone 100mg once daily but was unable to verify with pharmacy or dispense report    Prior to Admission medications    Medication Sig Last Dose Taking? Auth Provider   albuterol (PROAIR HFA/PROVENTIL HFA/VENTOLIN HFA) 108 (90 Base) MCG/ACT inhaler Inhale 2 puffs into the lungs every 6 hours as needed for shortness of breath / dyspnea or wheezing 9/1/2021 Yes Leatha Hugo DO   HYDROcodone-acetaminophen (NORCO)  MG per tablet Take 1 tablet by mouth every 4 hours as needed for severe pain 9/1/2021 Yes Unknown, Entered By History   pantoprazole (PROTONIX) 40 MG EC tablet Take 40 mg by mouth daily 9/1/2021 Yes Unknown, Entered By History   vortioxetine (TRINTELLIX) 10 MG tablet Take 10 mg by mouth daily 9/1/2021 Yes Unknown, Entered By History       Date completed: 09/02/21    Medication history completed by: Marlon Lopez, Pharmacy Intern

## 2021-09-03 NOTE — PLAN OF CARE
Patient had a good shift, stayed in room most of the time sleeping.  Flat affect, denies SI/SIB, endorses depression/anxiety.  MSSA 12 and 10, received 10 mg valium x2 per protocol. Patient also received 0.1 mg clonidine this afternoon for high blood pressure.  Patient splint on right arm patent, refused prn pain medications when offered.  Patient resting comfortably in room.  No aggressive behavior noted, will continue  to monitor closely.

## 2021-09-04 LAB
ERYTHROCYTE [DISTWIDTH] IN BLOOD BY AUTOMATED COUNT: 13.9 % (ref 10–15)
HCT VFR BLD AUTO: 42 % (ref 40–53)
HGB BLD-MCNC: 14.4 G/DL (ref 13.3–17.7)
MCH RBC QN AUTO: 30.6 PG (ref 26.5–33)
MCHC RBC AUTO-ENTMCNC: 34.3 G/DL (ref 31.5–36.5)
MCV RBC AUTO: 89 FL (ref 78–100)
PLATELET # BLD AUTO: 204 10E3/UL (ref 150–450)
RBC # BLD AUTO: 4.7 10E6/UL (ref 4.4–5.9)
WBC # BLD AUTO: 6.8 10E3/UL (ref 4–11)

## 2021-09-04 PROCEDURE — 250N000011 HC RX IP 250 OP 636: Performed by: PSYCHIATRY & NEUROLOGY

## 2021-09-04 PROCEDURE — 85027 COMPLETE CBC AUTOMATED: CPT | Performed by: PHYSICIAN ASSISTANT

## 2021-09-04 PROCEDURE — 250N000013 HC RX MED GY IP 250 OP 250 PS 637: Performed by: PSYCHIATRY & NEUROLOGY

## 2021-09-04 PROCEDURE — H2035 A/D TX PROGRAM, PER HOUR: HCPCS | Mod: HQ

## 2021-09-04 PROCEDURE — 250N000013 HC RX MED GY IP 250 OP 250 PS 637: Performed by: EMERGENCY MEDICINE

## 2021-09-04 PROCEDURE — 36415 COLL VENOUS BLD VENIPUNCTURE: CPT | Performed by: PHYSICIAN ASSISTANT

## 2021-09-04 PROCEDURE — 128N000004 HC R&B CD ADULT

## 2021-09-04 RX ORDER — ONDANSETRON 4 MG/1
4 TABLET, ORALLY DISINTEGRATING ORAL EVERY 6 HOURS PRN
Status: DISCONTINUED | OUTPATIENT
Start: 2021-09-04 | End: 2021-09-06 | Stop reason: HOSPADM

## 2021-09-04 RX ORDER — ALBUTEROL SULFATE 90 UG/1
2 AEROSOL, METERED RESPIRATORY (INHALATION) EVERY 6 HOURS PRN
Status: DISCONTINUED | OUTPATIENT
Start: 2021-09-04 | End: 2021-09-04

## 2021-09-04 RX ADMIN — THIAMINE HCL TAB 100 MG 100 MG: 100 TAB at 08:39

## 2021-09-04 RX ADMIN — ALBUTEROL SULFATE 2 PUFF: 90 AEROSOL, METERED RESPIRATORY (INHALATION) at 12:24

## 2021-09-04 RX ADMIN — DIAZEPAM 10 MG: 5 TABLET ORAL at 08:39

## 2021-09-04 RX ADMIN — ONDANSETRON HYDROCHLORIDE 4 MG: 4 TABLET, FILM COATED ORAL at 08:39

## 2021-09-04 RX ADMIN — DIAZEPAM 5 MG: 5 TABLET ORAL at 20:15

## 2021-09-04 RX ADMIN — DIAZEPAM 10 MG: 5 TABLET ORAL at 00:48

## 2021-09-04 RX ADMIN — PANTOPRAZOLE SODIUM 40 MG: 40 TABLET, DELAYED RELEASE ORAL at 08:39

## 2021-09-04 RX ADMIN — ONDANSETRON 4 MG: 4 TABLET, ORALLY DISINTEGRATING ORAL at 16:28

## 2021-09-04 RX ADMIN — DIAZEPAM 10 MG: 5 TABLET ORAL at 04:51

## 2021-09-04 RX ADMIN — TRAZODONE HYDROCHLORIDE 50 MG: 50 TABLET ORAL at 21:34

## 2021-09-04 RX ADMIN — VORTIOXETINE 10 MG: 5 TABLET, FILM COATED ORAL at 08:39

## 2021-09-04 RX ADMIN — MULTIPLE VITAMINS W/ MINERALS TAB 1 TABLET: TAB at 08:39

## 2021-09-04 RX ADMIN — DIAZEPAM 10 MG: 5 TABLET ORAL at 16:25

## 2021-09-04 RX ADMIN — IBUPROFEN 800 MG: 400 TABLET ORAL at 18:33

## 2021-09-04 RX ADMIN — ALBUTEROL SULFATE 2 PUFF: 90 AEROSOL, METERED RESPIRATORY (INHALATION) at 18:34

## 2021-09-04 RX ADMIN — DIAZEPAM 10 MG: 5 TABLET ORAL at 12:25

## 2021-09-04 RX ADMIN — FOLIC ACID 1 MG: 1 TABLET ORAL at 08:39

## 2021-09-04 RX ADMIN — IBUPROFEN 800 MG: 400 TABLET ORAL at 12:27

## 2021-09-04 ASSESSMENT — ACTIVITIES OF DAILY LIVING (ADL)
HYGIENE/GROOMING: INDEPENDENT
DRESS: STREET CLOTHES;INDEPENDENT
LAUNDRY: UNABLE TO COMPLETE
ORAL_HYGIENE: INDEPENDENT

## 2021-09-04 NOTE — PLAN OF CARE
Problem: Substance Withdrawal  Goal: Substance Withdrawal  Description: Signs and symptoms of listed problems will be absent or manageable.  Outcome: No Change   MSSA was 8 @ 1600 and received 10 mg valium. He also asked for zofran at 1630. He asks for zofran often for nausea which seems excessive. He states his arm hurts and also requesting motrin often.  Will continue to assess.

## 2021-09-04 NOTE — PLAN OF CARE
"Problem: Adult Inpatient Plan of Care  Goal: Plan of Care Review  Outcome: Improving  Flowsheets (Taken 9/4/2021 1049)  Plan of Care Reviewed With: patient     Problem: Substance Withdrawal  Goal: Substance Withdrawal  Description: Signs and symptoms of listed problems will be absent or manageable.  Outcome: Improving    Patient is isolative/withdrawn to room. Patient is ambulating balanced and steady. Patient is alert and oriented x 4. Patient is not attending/participating in unit programming. Pt is minimally social with peers. Affect is blunted/flat (full-range at times), mood is anxious. Patient rates anxiety a 6-7/10. Patient denies SI/SIB/HI. Pt denies auditory/visual hallucinations. Patient verbally contracts for safety on the unit.     This RN administered the PRN medications Zofran 4mg x 1, (see MAR) at the request of the patient for nausea. Patient states this medication was helpful. Patient is tolerating medications well, denies any current side effects.     Pt's MSSA's = 9 and 10 this shift, patient medicated with 10mg of valium x 2 this shift (See MAR) per MSSA protocol. Patient reports a good appetite, and good sleep. Patient discharge plans pending. Patient does report he would like inpatient treatment, and [place]. Rest, fluids, and food encouraged. Status 15 checks remain. Patient denies any unmet needs at this time.     Update:   At lunch, patient requested the following PRN's:   - Ibuprofen 800mg x 1 for arm pain/discomfort  - Albuterol inhaler PRN x 1 for asthma/wheezing.   Will monitor effectiveness.     Blood pressure (!) 156/104, pulse 98, temperature 98.8  F (37.1  C), temperature source Tympanic, resp. rate 17, height 1.778 m (5' 10\"), weight 108.9 kg (240 lb), SpO2 94 %.   "

## 2021-09-04 NOTE — PROGRESS NOTES
MSSA score of 8/8, pt received 20mg of valium for alcohol withdrawal this shift . Pt declined the use of his albuterol MDI.Pt is observed to sleep throughout the noc

## 2021-09-04 NOTE — PLAN OF CARE
Number of patients attending the group:  8  Group Length:  1 Hours    Group Therapy     Summary of Group / Topics Discussed:      The  Psychotherapy group goal is to promote insight to positive choice and change. Group processing is within a supportive and safe environment. Patients will process emotions using verbal group and expressive psychotherapy interventions.        Assessment Discussed Autobiography in 5 Chapters and stages of change. Group discussion included how to get out of their 'hole', the importance of asking for help. Discussed what it takes to begin considering getting out of the hole; Discussed what steps they can take to create a stronger recovery.         Patient Response- Pt was active in group, discussed above, attentive listening to others in the group.

## 2021-09-04 NOTE — PROGRESS NOTES
Patient reports that he only takes albuterol inhaler as needed instead of scheduled, Dr. Saucedo notified. Patient order now reflects this.

## 2021-09-05 PROCEDURE — 250N000013 HC RX MED GY IP 250 OP 250 PS 637: Performed by: EMERGENCY MEDICINE

## 2021-09-05 PROCEDURE — 250N000013 HC RX MED GY IP 250 OP 250 PS 637: Performed by: PSYCHIATRY & NEUROLOGY

## 2021-09-05 PROCEDURE — 250N000011 HC RX IP 250 OP 636: Performed by: PSYCHIATRY & NEUROLOGY

## 2021-09-05 PROCEDURE — H2032 ACTIVITY THERAPY, PER 15 MIN: HCPCS

## 2021-09-05 PROCEDURE — 128N000004 HC R&B CD ADULT

## 2021-09-05 RX ADMIN — IBUPROFEN 800 MG: 400 TABLET ORAL at 21:53

## 2021-09-05 RX ADMIN — PANTOPRAZOLE SODIUM 40 MG: 40 TABLET, DELAYED RELEASE ORAL at 08:25

## 2021-09-05 RX ADMIN — MULTIPLE VITAMINS W/ MINERALS TAB 1 TABLET: TAB at 08:25

## 2021-09-05 RX ADMIN — TRAZODONE HYDROCHLORIDE 50 MG: 50 TABLET ORAL at 21:53

## 2021-09-05 RX ADMIN — FOLIC ACID 1 MG: 1 TABLET ORAL at 08:25

## 2021-09-05 RX ADMIN — ALBUTEROL SULFATE 2 PUFF: 90 AEROSOL, METERED RESPIRATORY (INHALATION) at 18:34

## 2021-09-05 RX ADMIN — ONDANSETRON 4 MG: 4 TABLET, ORALLY DISINTEGRATING ORAL at 08:26

## 2021-09-05 RX ADMIN — THIAMINE HCL TAB 100 MG 100 MG: 100 TAB at 08:25

## 2021-09-05 RX ADMIN — DIAZEPAM 10 MG: 5 TABLET ORAL at 00:36

## 2021-09-05 RX ADMIN — ALBUTEROL SULFATE 2 PUFF: 90 AEROSOL, METERED RESPIRATORY (INHALATION) at 08:25

## 2021-09-05 RX ADMIN — IBUPROFEN 800 MG: 400 TABLET ORAL at 16:20

## 2021-09-05 RX ADMIN — VORTIOXETINE 10 MG: 5 TABLET, FILM COATED ORAL at 08:25

## 2021-09-05 RX ADMIN — IBUPROFEN 800 MG: 400 TABLET ORAL at 08:25

## 2021-09-05 ASSESSMENT — ACTIVITIES OF DAILY LIVING (ADL)
HYGIENE/GROOMING: INDEPENDENT
DRESS: SCRUBS (BEHAVIORAL HEALTH)
LAUNDRY: WITH SUPERVISION
ORAL_HYGIENE: INDEPENDENT
HYGIENE/GROOMING: SHOWER;WITH ASSISTANCE
DRESS: STREET CLOTHES;INDEPENDENT
LAUNDRY: UNABLE TO COMPLETE
ORAL_HYGIENE: INDEPENDENT

## 2021-09-05 NOTE — PROGRESS NOTES
Brief Medicine Note    BP normalized this morning.   No PRN clonidine use over past 24 hours. Do NOT continue this medication on discharge.   IM will sign off.   Please call if new concerns arise.     Tamra Luciano PA-C  Hospitalist Service  Contact information available via [facility] Paging/Directory

## 2021-09-05 NOTE — PLAN OF CARE
"Problem: Adult Inpatient Plan of Care  Goal: Plan of Care Review  Outcome: Improving  Flowsheets (Taken 9/5/2021 0909)  Plan of Care Reviewed With: patient     Problem: Substance Withdrawal  Goal: Substance Withdrawal  Description: Signs and symptoms of listed problems will be absent or manageable.  Outcome: Improving    Patient is up and visible in the milieu for breakfast. Patient met with writer for withdrawal assessments, and went to his room to sleep. Patient is ambulating balanced and steady. Patient is alert and oriented x 4. Patient is minimally attending/participating in unit programming. Pt is selectively social with peers. Affect is flat (full-range at times), mood is calm. Patient denies SI/SIB/HI. Pt denies auditory/visual hallucinations. Patient verbally contracts for safety on the unit.     This RN administered the PRN medications (see MAR) at the request of the patient:  - Albuterol inhaler x 1 for wheezing (since improved)  - Ibuprofen 800mg x 1 for arm pain  - Zofran 4mg ODT x 1 for nausea.     Patient is tolerating medications well, denies any current side effects.     Pt's MSSA = 6 and 6 this shift. Patient reports a good appetite, and good sleep. Patient discharge plans pending. Rest, fluids, and food encouraged. Status 15 checks remain. Patient denies any unmet needs at this time.     Blood pressure 122/88, pulse 83, temperature 97.9  F (36.6  C), temperature source Temporal, resp. rate 16, height 1.778 m (5' 10\"), weight 108.9 kg (240 lb), SpO2 94 %.   "

## 2021-09-05 NOTE — PROGRESS NOTES
MSSA scores of 8/5, pt receives 10mg of valium for alcohol withdrawal this shift and is observed to sleep throughout the noc.   Results within acceptable limits.  -HIV test is normal..

## 2021-09-06 VITALS
RESPIRATION RATE: 16 BRPM | HEIGHT: 70 IN | DIASTOLIC BLOOD PRESSURE: 80 MMHG | WEIGHT: 240 LBS | BODY MASS INDEX: 34.36 KG/M2 | SYSTOLIC BLOOD PRESSURE: 143 MMHG | TEMPERATURE: 98.3 F | HEART RATE: 86 BPM | OXYGEN SATURATION: 94 %

## 2021-09-06 PROCEDURE — 250N000013 HC RX MED GY IP 250 OP 250 PS 637: Performed by: PSYCHIATRY & NEUROLOGY

## 2021-09-06 PROCEDURE — 250N000013 HC RX MED GY IP 250 OP 250 PS 637: Performed by: EMERGENCY MEDICINE

## 2021-09-06 PROCEDURE — 99239 HOSP IP/OBS DSCHRG MGMT >30: CPT | Performed by: PSYCHIATRY & NEUROLOGY

## 2021-09-06 RX ORDER — MULTIPLE VITAMINS W/ MINERALS TAB 9MG-400MCG
1 TAB ORAL DAILY
Qty: 30 TABLET | Refills: 0 | Status: ON HOLD | OUTPATIENT
Start: 2021-09-07 | End: 2021-09-24

## 2021-09-06 RX ORDER — FOLIC ACID 1 MG/1
1 TABLET ORAL DAILY
Qty: 30 TABLET | Refills: 0 | Status: ON HOLD | OUTPATIENT
Start: 2021-09-07 | End: 2021-09-24

## 2021-09-06 RX ORDER — LANOLIN ALCOHOL/MO/W.PET/CERES
100 CREAM (GRAM) TOPICAL DAILY
Qty: 30 TABLET | Refills: 0 | Status: ON HOLD | OUTPATIENT
Start: 2021-09-07 | End: 2021-09-24

## 2021-09-06 RX ADMIN — PANTOPRAZOLE SODIUM 40 MG: 40 TABLET, DELAYED RELEASE ORAL at 08:22

## 2021-09-06 RX ADMIN — TRAZODONE HYDROCHLORIDE 50 MG: 50 TABLET ORAL at 02:23

## 2021-09-06 RX ADMIN — ACETAMINOPHEN 975 MG: 325 TABLET, FILM COATED ORAL at 09:32

## 2021-09-06 RX ADMIN — FOLIC ACID 1 MG: 1 TABLET ORAL at 08:22

## 2021-09-06 RX ADMIN — MULTIPLE VITAMINS W/ MINERALS TAB 1 TABLET: TAB at 08:22

## 2021-09-06 RX ADMIN — HYDROXYZINE HYDROCHLORIDE 25 MG: 25 TABLET, FILM COATED ORAL at 02:23

## 2021-09-06 RX ADMIN — IBUPROFEN 800 MG: 400 TABLET ORAL at 08:22

## 2021-09-06 RX ADMIN — VORTIOXETINE 10 MG: 5 TABLET, FILM COATED ORAL at 08:22

## 2021-09-06 RX ADMIN — THIAMINE HCL TAB 100 MG 100 MG: 100 TAB at 08:22

## 2021-09-06 RX ADMIN — ALBUTEROL SULFATE 2 PUFF: 90 AEROSOL, METERED RESPIRATORY (INHALATION) at 10:40

## 2021-09-06 ASSESSMENT — ACTIVITIES OF DAILY LIVING (ADL)
LAUNDRY: WITH SUPERVISION
ORAL_HYGIENE: INDEPENDENT
DRESS: SCRUBS (BEHAVIORAL HEALTH)
HYGIENE/GROOMING: SHOWER

## 2021-09-06 NOTE — PLAN OF CARE
Problem: General Rehab Plan of Care  Goal: Therapeutic Recreation/Music Therapy Goal (Art Therapy)  Description: The patient and/or their representative will achieve their patient-specific goals related to the plan of care.  The patient-specific goals include: emotional expression  Outcome: No Change   Art Therapy directive is to create a drawing of a safe place and to identify five items that represent each of the five senses within safe place using art media of pts choice.  Goals of directive: trauma containment, emotional expression, emotional regulation.  Pt was a positive participant, focused on task for the full duration of group. Pt finished drawing and briefly shared with group. Pts mood was calm, pleasant participant.

## 2021-09-06 NOTE — PLAN OF CARE
Problem: Substance Withdrawal  Intervention: Substance Withdrawal  Recent Flowsheet Documentation  Taken 9/5/2021 5542 by Madelin Spears RN  Substance Withdrawal Interventions:   interventions implemented as appropriate   monitor substance withdrawal process   encourage nutrition and hydration   assess patient response to medication  Alcohol Withdrawl Interventions:   monitor need for prn medication   establish therapeutic relationship   assist with developing & utilizing healthy coping strategies   build upon strengths       Pt being monitored for alcohol withdrawal. No medications for alcohol withdrawal this shift. Pt has received a total of 155 mg of valium since admission.     No oversedation noted.     Pt has splint on right arm. Took shower this evening. Motrin x1 for wrist pain. Rates his pain a 6 on a 0-10 severe scale. Pt plans to call [facility] to get an earlier appointment for his wrist fx.   Good CMS.     Pt states he plans to stay with his mother and look at his treatment options.     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     Denies feeling depressed or anxious. Denies SI.

## 2021-09-06 NOTE — PROGRESS NOTES
Checked in with Pt for paperwork and assessment.  Pt states he now plans to return home and follow up with treatment for his broken arm.  Pt declines assessment and referral to treatment.  AVS initiated.

## 2021-09-06 NOTE — DISCHARGE SUMMARY
Mark Berumen MRN# [id number]   Age: 56 year old YOB: 1965     Date of Admission:  9/2/2021  Date of Discharge:  9/6/2021  Admitting Physician:  Rizwan Hernandez MD  Discharge Physician:  Vipin Saucedo MD      DISCHARGE  DX    Alcohol use disorder severe         Event Leading to Hospitalization:     See Admission note by admitting provider for patient encounter. for additional details.          Hospital Course:   PATIENT was admitted to [facility] attending  under DR saucedo, please review the detailed admit note on 9/3/21   The patient was placed under status 15 (15 minute checks) to ensure patient safety.   MSSA protocol was initiated due to the patient's history of alcohol abuse and concern for withdrawal symptoms.  CBC, BMP and utox obtained.    All outpatient medications were continued    PATIENTdid participate in groups and was visible in the milieu.     The patient's symptoms of alcohol withdrawal  improved.     Patients energy motivation , sleep appetite improved.  Pt completed detox . It was un eventful.      Discussed with patient medications for craving.  Spoke with patient about triggers coping skills relapse prevention.    CONSULTS DONE DURING PATIENTS HOSPITALIZATION.  Patient was seen by medicine on date9/3/21    This as per their medical consult     Assessment and Recommendations:   Mark Berumen is a 56 year old male with a history of alcohol use disorder, GERD, asthma and depression who is admitted to station [facility] with alcohol withdrawal. Internal Medicine consultation was ordered by Dr. Hernandez for general medical evaluation.       Alcohol use disorder with acute withdrawal:  Drinking alcohol daily for the past week. Reports hx of JEN several years ago. Drinking 1/5 fireball and 6 pack of white claws/day. Last drink 9/2 PTA. Ethanol level 0.22. Denies DTs. MSSA currently 12.   -Management per psychiatry team.   -Agree with MVI, folic acid and thiamine supplementation       Right wrist fracture   Reports falling on his son's scooter last week and was seen Sunday ~8/29 at [address] [place] [address] in [place]. He reports having a wrist fracture to right wrist and has a thumb spica splint on R wrist in place. Was prescribed Norco 10-325mg every 4 hours PRN at that time. He reports having an apt at [facility] on 9/14 with orthopedics provider in [place], [place]. He denies any other injury to wrist since splint placement. Denies paresthesias. NV intact.  - Follow-up as scheduled with Orthpedics provider at [facility] on 9/14 for follow-up  - Tylenol PRN for pain      Mild leukocytosis:  WBC 7.9 on admission -> 11.5 today. No infectious s/sx. Afebrile on exam.   - Trend CBC in AM  - Notify medicine if T >100.4F, or any new infectious signs or symptoms.     Mild hypertriglyceridemia:   Triglyceride level 289. Other lipid panel WNL. Follow-up with PCP for repeat fasting lipid panel. Alcohol cessation and lifestyle modifications discussed.      Elevated blood pressure:   Denies hx of HTN. /108 in setting of alcohol withdrawal. Asymptomatic.   - Clonidine 0.1mg BID PRN for SBP >170 or DBP >110  - Please notify medicine if SBP >170 or DBP >110 or SBP >140 or DBP >90 when out of withdrawal period  - Will need close follow up with PCP for BP monitoring     GERD:   - Continue PTA Pantoprazole 40mg daily      Depression:   Denies any SI/HI. On Vortioxetine 10mg daily.   - Management per psychiatry team     Asthma:  On PTA Albuterol PRN. Reports using this daily. Reports mild dyspnea, and has scant wheeze on exam. Denies any fever, chills or sputum production. POx 95% on room air.   - Schedule albuterol QID and PRN  - Please notify medicine if febrile >100.4F, worsening dyspnea, or hypoxia - would obtain STAT CXR at that time and broaden work-up  - Follow up with PCP to discuss maintenance inhaler         Medicine will follow-up on BP peripherally, please page with any additional concerns.       Brief Medicine  Note     BP normalized this morning.   No PRN clonidine use over past 24 hours. Do NOT continue this medication on discharge.   IM will sign off.   Please call if new concerns arise.        Pt was seen by cm  As per recommendations from cm    Checked in with Pt for paperwork and assessment.  Pt states he now plans to return home and follow up with treatment for his broken arm.  Pt declines assessment and referral to treatment.  AVS initiated.         Labs:reviewed with patient       Recent Results (from the past 48 hour(s))   CBC with platelets    Collection Time: 09/04/21  4:21 PM   Result Value Ref Range    WBC Count 6.8 4.0 - 11.0 10e3/uL    RBC Count 4.70 4.40 - 5.90 10e6/uL    Hemoglobin 14.4 13.3 - 17.7 g/dL    Hematocrit 42.0 40.0 - 53.0 %    MCV 89 78 - 100 fL    MCH 30.6 26.5 - 33.0 pg    MCHC 34.3 31.5 - 36.5 g/dL    RDW 13.9 10.0 - 15.0 %    Platelet Count 204 150 - 450 10e3/uL         Recent Results (from the past 240 hour(s))   Alcohol breath test POCT    Collection Time: 09/02/21  3:23 PM   Result Value Ref Range    Alcohol Breath Test 0.22 (A) 0.00 - 0.01   Comprehensive metabolic panel    Collection Time: 09/02/21  3:57 PM   Result Value Ref Range    Sodium 137 133 - 144 mmol/L    Potassium 3.9 3.4 - 5.3 mmol/L    Chloride 103 94 - 109 mmol/L    Carbon Dioxide (CO2) 28 20 - 32 mmol/L    Anion Gap 6 3 - 14 mmol/L    Urea Nitrogen 9 7 - 30 mg/dL    Creatinine 0.66 0.66 - 1.25 mg/dL    Calcium 7.8 (L) 8.5 - 10.1 mg/dL    Glucose 112 (H) 70 - 99 mg/dL    Alkaline Phosphatase 97 40 - 150 U/L    AST 21 0 - 45 U/L    ALT 42 0 - 70 U/L    Protein Total 7.7 6.8 - 8.8 g/dL    Albumin 4.0 3.4 - 5.0 g/dL    Bilirubin Total 0.4 0.2 - 1.3 mg/dL    GFR Estimate >90 >60 mL/min/1.73m2   Magnesium    Collection Time: 09/02/21  3:57 PM   Result Value Ref Range    Magnesium 2.2 1.6 - 2.3 mg/dL   CBC with platelets and differential    Collection Time: 09/02/21  3:57 PM   Result Value Ref Range    WBC Count 7.9 4.0 - 11.0  10e3/uL    RBC Count 5.17 4.40 - 5.90 10e6/uL    Hemoglobin 15.8 13.3 - 17.7 g/dL    Hematocrit 45.0 40.0 - 53.0 %    MCV 87 78 - 100 fL    MCH 30.6 26.5 - 33.0 pg    MCHC 35.1 31.5 - 36.5 g/dL    RDW 14.1 10.0 - 15.0 %    Platelet Count 293 150 - 450 10e3/uL    % Neutrophils 67 %    % Lymphocytes 29 %    % Monocytes 3 %    % Eosinophils 0 %    % Basophils 1 %    % Immature Granulocytes 0 %    NRBCs per 100 WBC 0 <1 /100    Absolute Neutrophils 5.2 1.6 - 8.3 10e3/uL    Absolute Lymphocytes 2.3 0.8 - 5.3 10e3/uL    Absolute Monocytes 0.2 0.0 - 1.3 10e3/uL    Absolute Eosinophils 0.0 0.0 - 0.7 10e3/uL    Absolute Basophils 0.1 0.0 - 0.2 10e3/uL    Absolute Immature Granulocytes 0.0 <=0.0 10e3/uL    Absolute NRBCs 0.0 10e3/uL   Asymptomatic COVID-19 Virus (Coronavirus) by PCR Nasopharyngeal    Collection Time: 09/02/21  3:58 PM    Specimen: Nasopharyngeal; Swab   Result Value Ref Range    SARS CoV2 PCR Negative Negative   Comprehensive metabolic panel    Collection Time: 09/03/21  7:32 AM   Result Value Ref Range    Sodium 135 133 - 144 mmol/L    Potassium 3.9 3.4 - 5.3 mmol/L    Chloride 102 94 - 109 mmol/L    Carbon Dioxide (CO2) 27 20 - 32 mmol/L    Anion Gap 6 3 - 14 mmol/L    Urea Nitrogen 14 7 - 30 mg/dL    Creatinine 0.72 0.66 - 1.25 mg/dL    Calcium 7.9 (L) 8.5 - 10.1 mg/dL    Glucose 107 (H) 70 - 99 mg/dL    Alkaline Phosphatase 93 40 - 150 U/L    AST 18 0 - 45 U/L    ALT 35 0 - 70 U/L    Protein Total 7.2 6.8 - 8.8 g/dL    Albumin 3.8 3.4 - 5.0 g/dL    Bilirubin Total 0.9 0.2 - 1.3 mg/dL    GFR Estimate >90 >60 mL/min/1.73m2   Lipid panel    Collection Time: 09/03/21  7:32 AM   Result Value Ref Range    Cholesterol 134 <200 mg/dL    Triglycerides 289 (H) <150 mg/dL    Direct Measure HDL 49 >=40 mg/dL    LDL Cholesterol Calculated 27 <=100 mg/dL    Non HDL Cholesterol 85 <130 mg/dL    Patient Fasting > 8hrs? Yes    TSH with free T4 reflex and/or T3 as indicated    Collection Time: 09/03/21  7:32 AM   Result  Value Ref Range    TSH 3.60 0.40 - 4.00 mU/L   CBC with platelets and differential    Collection Time: 09/03/21  7:32 AM   Result Value Ref Range    WBC Count 11.5 (H) 4.0 - 11.0 10e3/uL    RBC Count 4.95 4.40 - 5.90 10e6/uL    Hemoglobin 15.4 13.3 - 17.7 g/dL    Hematocrit 43.6 40.0 - 53.0 %    MCV 88 78 - 100 fL    MCH 31.1 26.5 - 33.0 pg    MCHC 35.3 31.5 - 36.5 g/dL    RDW 14.3 10.0 - 15.0 %    Platelet Count 275 150 - 450 10e3/uL    % Neutrophils 74 %    % Lymphocytes 18 %    % Monocytes 6 %    % Eosinophils 1 %    % Basophils 1 %    % Immature Granulocytes 0 %    NRBCs per 100 WBC 0 <1 /100    Absolute Neutrophils 8.5 (H) 1.6 - 8.3 10e3/uL    Absolute Lymphocytes 2.1 0.8 - 5.3 10e3/uL    Absolute Monocytes 0.7 0.0 - 1.3 10e3/uL    Absolute Eosinophils 0.1 0.0 - 0.7 10e3/uL    Absolute Basophils 0.1 0.0 - 0.2 10e3/uL    Absolute Immature Granulocytes 0.0 <=0.0 10e3/uL    Absolute NRBCs 0.0 10e3/uL   CBC with platelets    Collection Time: 09/04/21  4:21 PM   Result Value Ref Range    WBC Count 6.8 4.0 - 11.0 10e3/uL    RBC Count 4.70 4.40 - 5.90 10e6/uL    Hemoglobin 14.4 13.3 - 17.7 g/dL    Hematocrit 42.0 40.0 - 53.0 %    MCV 89 78 - 100 fL    MCH 30.6 26.5 - 33.0 pg    MCHC 34.3 31.5 - 36.5 g/dL    RDW 13.9 10.0 - 15.0 %    Platelet Count 204 150 - 450 10e3/uL            Because this patient meets criteria for an Alcohol Use Disorder, I performed the following brief intervention on the date of this note:              1) Expressed concern that the patient is drinking at unhealthy levels known to increase their risk of alcohol related problems              2) Gave feedback linking alcohol use and health, including personalized feedback explaining how alcohol use can interact with their medical and/or psychiatric problems, and with prescribed medications.              3) Advised patient to abstain.    PT counseled on nicotine cessation and nicotine replacement provided    Discussed with patient many issues of  addiction,triggers, relapse, and establishing a solid recovery program.    DISCHARGE MENTAL STATUS EXAMINATION:  The patient is alert, oriented x3.  Good fund of knowledge.  Good use of language.  Recent and remote memory, language, fund of knowledge are all adequate.  Euthymic mood congruent affect  Speech normal rate/rhythm linear tp no loose asso,The patient does not have any active suicidal or homicidal ideation.  Does not have any auditory or visual hallucination.  Fair insight/judgment At this time, the patient was stable to be discharged.        Pt was not determined to not be a danger to himself or others. At the current time of discharge, the patient does not meet criteria for involuntary hospitalization. On the day of discharge, the patient reports that they do not have suicidal or homicidal ideation and would never hurt themselves or others. Steps taken to minimize risk include: assessing patient s behavior and thought process daily during hospital stay, discharging patient with adequate plan for follow up for mental and physical health and discussing safety plan of returning to the hospital should the patient ever have thoughts of harming themselves or others. Therefore, based on all available evidence including the factors cited above, the patient does not appear to be at imminent risk for self-harm, and is appropriate for outpatient level of care.     Educated about side effects/risk vs benefits /alternative including non treatment.Pt consented to be on medication.     .Total time spent on discharge summary more than 35 min  More than  20 min  planning, coordination of care, medication reconciliation and performance of physical exam on day of discharge.Care was coordinated with unit RN and unit therapist       Mark Berumen   Home Medication Instructions KASHIF:[id number]    Printed on:09/06/21 1208   Medication Information                      albuterol (PROAIR HFA/PROVENTIL HFA/VENTOLIN HFA) 108 (90  "Base) MCG/ACT inhaler  Inhale 2 puffs into the lungs every 6 hours as needed for shortness of breath / dyspnea or wheezing             HYDROcodone-acetaminophen (NORCO)  MG per tablet  Take 1 tablet by mouth every 4 hours as needed for severe pain             pantoprazole (PROTONIX) 40 MG EC tablet  Take 40 mg by mouth daily             vortioxetine (TRINTELLIX) 10 MG tablet  Take 10 mg by mouth daily                  Disposition: home     Facts about COVID19 at www.cdc.gov/COVID19 and www.[place].gov/covid19     Keeping hands clean is one of the most important steps we can take to avoid getting sick and spreading germs to others.  Please wash your hands frequently and lather with soap for at least 20 seconds!     Medical Follow-Up:[facility] 9/14/21       Treatment Follow-Up:[facility]   .        \"Much or all of the text in this note was generated through the use of Dragon Dictate voice to text software. Errors in spelling or words which appear to be out of contact are unintentional, may be present due having escaped editing\"       "

## 2021-09-06 NOTE — PROGRESS NOTES
MSSA score of 4, pt did not require any medication for alcohol withdrawal, requests and receives Trazadone 50 mg and hydroxizine 25 mg  and is observed to sleep the remainder of the noc.

## 2021-09-06 NOTE — PLAN OF CARE
"Patient alert and oriented to person, place, time and situation. Mood is \"better\" today. Affect pleasant and full range. He denies any current SI or self harm ideation.He has a discharge order to home today. Patient plans to follow-up outpatient with [facility] after discharge and already has this set up. Prn Ibuprofen and acetaminophen given for right wrist pain with relief once both medications given. Patient discharged via Uber to home at this time. Reviewed all information in AVS prior to discharge. All belongings check and sent.   "

## 2021-09-06 NOTE — DISCHARGE INSTRUCTIONS
Behavioral Discharge Planning and Instructions  THANK YOU FOR CHOOSING [facility]  3AW  [phone]    Summary: You were admitted to [facility] on 9/2/2021 for detoxification from alcohol.  A medical exam was performed that included lab work. You have met with a  and opted to return home.  You have declined assessment and referral to treatment.  Please take care and make your recovery a daily priority, Mark!  It was a pleasure working with you and the entire treatment team here wishes you the very best in your recovery!     Recommendation:  If you need more support to maintain sobriety call [phone] to schedule a chemical assessment and follow the recommendations of that assessment.      Main Diagnoses:  Per Dr. Lewis MD; psychiatrist  1. Alcohol use disorder severe  2. Alcohol withdrawal severe  3. Right wrist fall    Major Treatments, Procedures and Findings:  You were treated for alcohol detoxification using the medication, valium. You declined a chemical dependency assessment. You had labs drawn and those results were reviewed with you. Please take a copy of your lab work with you to your next primary care provider appointment.    Symptoms to Report:  If you experience more anxiety, confusion, sleeplessness, deep sadness or thoughts of suicide, notify your treatment team or notify your primary care provider. IF ANY OF THE SYMPTOMS YOU ARE EXPERIENCING ARE A MEDICAL EMERGENCY CALL 911 IMMEDIATELY.     Lifestyle Adjustment: Adjust your lifestyle to get enough sleep, relaxation, exercise and good nutrition. Continue to develop healthy coping skills to decrease stress and promote a sober living environment. Do not use mood altering substances including alcohol, illegal drugs or addictive medications other than what is currently prescribed.     Disposition: Home    Facts about COVID19 at www.cdc.gov/COVID19 and www.[place].gov/covid19    Keeping hands clean is one of the most important steps we  can take to avoid getting sick and spreading germs to others.  Please wash your hands frequently and lather with soap for at least 20 seconds!    Follow-up Appointment:   [facility]    [address]    [place] [address] [phone]   Jimmie Canseco MD    [address] [place] [address]    [phone] (Work)    [phone] (Fax)        [facility] [place]      Orthopedic clinic in [place], [place]  COVID-19 info: [employer]  Get online care: VoluBill  Located in: [facility] [place]  Address: [address] , [place] [address]  Hours:   Open ? Closes 8PM  Phone: [phone]  Appointments: [employer]    Recovery apps for your phone to locate current in person and zoom recovery meetings  [place] - meeting america  AA  - meeting america  Meeting guide - meeting america  Quick NA meeting - meeting america  Dana- has various apps              Resources:  *due to covid-19 most AA/NA meetings are being held online*  AA meetings online search for them at: https://aa-intergroup.org (worldwide meeting listings)  AA meetings for [place] area can be found online at: https://a[place].org (click local online meetings listings)  NA meetings for [place] area can be found online at: https://www.nam[place].org  (click find a meeting)  AA and NA Sponsors are excellent resources for support and you can find one at any support group meeting.   Alcoholics Anonymous (https://aa.org/): for information 24 hours/day  AA Intergroup service office in [place] (http://www.aastpaul.org/) [phone]  AA Intergroup service office in [facility]: [phone]. (http://www.a[place].org/)  Narcotics Anonymous (www.nam[place].org) (638) 249-4536  https://aafairviewriverside.org/meetings  SMART Recovery - self management for addiction recovery:  www.smartrecovery.org  Pathways ~ A Health Crisis Resource & Support Center:   169.546.1165.  https://prescribetoprevent.org/patient-education/videos/  http://www.harmreduction.org  Merged with [facility] [phone]  Support Group:  AA/NA and Sponsor/support.  National [place] on Mental Illness (www.[place].kevin.org): 693.519.3552 or 746-475-3996.  Alcoholics Anonymous (www.alcoholics-anonymous.org): Check your phone book for your local chapter.  Suicide Awareness Voices of Education (SAVE) (www.save.org): 939-005-CWQT (6913)  National Suicide Prevention Line (www.mentalhealthmn.org): 812-548-ZMZN (0413)  Mental Health Consumer/Survivor Network of [place] (www.mhcsn.net): 750.180.1783 or 818-566-2700  Mental Health Association of [place] (www.mentalhealth.org): 163.138.6250 or 336-103-6860   Substance Abuse and Mental Health Services (www.samhsa.gov)  [place] Opioid Prevention Coalition: www.opioidcoalition.org    [place] Recovery Connection ([facility])  [facility] connects people seeking recovery to resources that help foster and sustain long-term recovery.  Whether you are seeking resources for treatment, transportation, housing, job training, education, health care or other pathways to recovery, [facility] is a great place to start.  968.304.7295.  www.[place]365looks.[facility]    Great Pod casts for nutrition and wellness  Listen on Apple Podcasts  Dishing Up Nutrition   [employer]   Nutrition       Understand the connection between what you eat and how you feel. Hosted by licensed nutritionists and dietitians from [employer] we share practical, real-life solutions for healthier living through nutrition.     General Medication Instructions:   See your medication sheet(s) for instructions.   Take all medications as prescribed.  Make no changes unless your primary care provider suggests them.   Go to all your primary care provider visits.  Be sure to have all your required lab tests. This way, your medicines can be refilled on time.  Do not use any forms of alcohol.    Please  Note:  If you have any questions at anytime after you are discharged please call [facility] detox unit 3AW at [phone].  [facility], Behavioral Intake [phone]  Medical Records call [phone]  Outpatient Behavioral Intake call [phone]  LP+ Wait List/Bed Availability call [phone]    Please remember to take all of your behavioral discharge planning and lab paperwork to any follow up appointments, it contains your lab results, diagnosis, medication list and discharge recommendations.      THANK YOU FOR CHOOSING [facility]

## 2021-09-06 NOTE — PROGRESS NOTES
"Last score on MSSA for alcohol withdrawal requiring medication was over 24 hours ago. Last diazepam 9/05/21 at 0030. Patient considered to be \"out of withdrawal\" for alcohol at this time. He denies any subjective withdrawal sx and VSS.   "

## 2021-09-21 ENCOUNTER — TELEPHONE (OUTPATIENT)
Dept: BEHAVIORAL HEALTH | Facility: CLINIC | Age: 56
End: 2021-09-21

## 2021-09-21 ENCOUNTER — HOSPITAL ENCOUNTER (INPATIENT)
Facility: CLINIC | Age: 56
LOS: 2 days | Discharge: PSYCHIATRIC OR SUBSTANCE USE FACILITY | DRG: 897 | End: 2021-09-24
Attending: EMERGENCY MEDICINE | Admitting: PSYCHIATRY & NEUROLOGY
Payer: COMMERCIAL

## 2021-09-21 DIAGNOSIS — S52.514D CLOSED NONDISPLACED FRACTURE OF STYLOID PROCESS OF RIGHT RADIUS WITH ROUTINE HEALING, SUBSEQUENT ENCOUNTER: Primary | ICD-10-CM

## 2021-09-21 DIAGNOSIS — F19.20 CHEMICAL DEPENDENCY (H): ICD-10-CM

## 2021-09-21 DIAGNOSIS — F10.220 ALCOHOL DEPENDENCE WITH UNCOMPLICATED INTOXICATION (H): ICD-10-CM

## 2021-09-21 DIAGNOSIS — Z20.822 CONTACT WITH AND (SUSPECTED) EXPOSURE TO COVID-19: ICD-10-CM

## 2021-09-21 LAB
ALBUMIN SERPL-MCNC: 3.8 G/DL (ref 3.4–5)
ALCOHOL BREATH TEST: 0.24 (ref 0–0.01)
ALP SERPL-CCNC: 117 U/L (ref 40–150)
ALT SERPL W P-5'-P-CCNC: 42 U/L (ref 0–70)
AMPHETAMINES UR QL SCN: ABNORMAL
ANION GAP SERPL CALCULATED.3IONS-SCNC: 6 MMOL/L (ref 3–14)
AST SERPL W P-5'-P-CCNC: 40 U/L (ref 0–45)
BARBITURATES UR QL: ABNORMAL
BASOPHILS # BLD AUTO: 0.1 10E3/UL (ref 0–0.2)
BASOPHILS NFR BLD AUTO: 2 %
BENZODIAZ UR QL: ABNORMAL
BILIRUB SERPL-MCNC: 0.4 MG/DL (ref 0.2–1.3)
BUN SERPL-MCNC: 6 MG/DL (ref 7–30)
CALCIUM SERPL-MCNC: 7.7 MG/DL (ref 8.5–10.1)
CANNABINOIDS UR QL SCN: ABNORMAL
CHLORIDE BLD-SCNC: 105 MMOL/L (ref 94–109)
CO2 SERPL-SCNC: 30 MMOL/L (ref 20–32)
COCAINE UR QL: ABNORMAL
CREAT SERPL-MCNC: 0.63 MG/DL (ref 0.66–1.25)
EOSINOPHIL # BLD AUTO: 0.2 10E3/UL (ref 0–0.7)
EOSINOPHIL NFR BLD AUTO: 3 %
ERYTHROCYTE [DISTWIDTH] IN BLOOD BY AUTOMATED COUNT: 15.9 % (ref 10–15)
GFR SERPL CREATININE-BSD FRML MDRD: >90 ML/MIN/1.73M2
GLUCOSE BLD-MCNC: 104 MG/DL (ref 70–99)
HCT VFR BLD AUTO: 45.2 % (ref 40–53)
HGB BLD-MCNC: 15.3 G/DL (ref 13.3–17.7)
IMM GRANULOCYTES # BLD: 0 10E3/UL
IMM GRANULOCYTES NFR BLD: 1 %
LYMPHOCYTES # BLD AUTO: 2.7 10E3/UL (ref 0.8–5.3)
LYMPHOCYTES NFR BLD AUTO: 50 %
MAGNESIUM SERPL-MCNC: 2.2 MG/DL (ref 1.6–2.3)
MCH RBC QN AUTO: 30.6 PG (ref 26.5–33)
MCHC RBC AUTO-ENTMCNC: 33.8 G/DL (ref 31.5–36.5)
MCV RBC AUTO: 90 FL (ref 78–100)
MONOCYTES # BLD AUTO: 0.2 10E3/UL (ref 0–1.3)
MONOCYTES NFR BLD AUTO: 4 %
NEUTROPHILS # BLD AUTO: 2.1 10E3/UL (ref 1.6–8.3)
NEUTROPHILS NFR BLD AUTO: 40 %
NRBC # BLD AUTO: 0 10E3/UL
NRBC BLD AUTO-RTO: 0 /100
OPIATES UR QL SCN: ABNORMAL
PLATELET # BLD AUTO: 252 10E3/UL (ref 150–450)
POTASSIUM BLD-SCNC: 4.1 MMOL/L (ref 3.4–5.3)
PROT SERPL-MCNC: 7.4 G/DL (ref 6.8–8.8)
RBC # BLD AUTO: 5 10E6/UL (ref 4.4–5.9)
SARS-COV-2 RNA RESP QL NAA+PROBE: NEGATIVE
SODIUM SERPL-SCNC: 141 MMOL/L (ref 133–144)
WBC # BLD AUTO: 5.3 10E3/UL (ref 4–11)

## 2021-09-21 PROCEDURE — 83735 ASSAY OF MAGNESIUM: CPT | Performed by: EMERGENCY MEDICINE

## 2021-09-21 PROCEDURE — 36415 COLL VENOUS BLD VENIPUNCTURE: CPT | Performed by: EMERGENCY MEDICINE

## 2021-09-21 PROCEDURE — 80053 COMPREHEN METABOLIC PANEL: CPT | Performed by: EMERGENCY MEDICINE

## 2021-09-21 PROCEDURE — 99285 EMERGENCY DEPT VISIT HI MDM: CPT | Mod: 25 | Performed by: EMERGENCY MEDICINE

## 2021-09-21 PROCEDURE — 99284 EMERGENCY DEPT VISIT MOD MDM: CPT | Performed by: EMERGENCY MEDICINE

## 2021-09-21 PROCEDURE — 96361 HYDRATE IV INFUSION ADD-ON: CPT | Performed by: EMERGENCY MEDICINE

## 2021-09-21 PROCEDURE — 85025 COMPLETE CBC W/AUTO DIFF WBC: CPT | Performed by: EMERGENCY MEDICINE

## 2021-09-21 PROCEDURE — C9803 HOPD COVID-19 SPEC COLLECT: HCPCS | Performed by: EMERGENCY MEDICINE

## 2021-09-21 PROCEDURE — 96360 HYDRATION IV INFUSION INIT: CPT | Performed by: EMERGENCY MEDICINE

## 2021-09-21 PROCEDURE — U0003 INFECTIOUS AGENT DETECTION BY NUCLEIC ACID (DNA OR RNA); SEVERE ACUTE RESPIRATORY SYNDROME CORONAVIRUS 2 (SARS-COV-2) (CORONAVIRUS DISEASE [COVID-19]), AMPLIFIED PROBE TECHNIQUE, MAKING USE OF HIGH THROUGHPUT TECHNOLOGIES AS DESCRIBED BY CMS-2020-01-R: HCPCS | Performed by: EMERGENCY MEDICINE

## 2021-09-21 PROCEDURE — 80307 DRUG TEST PRSMV CHEM ANLYZR: CPT | Performed by: EMERGENCY MEDICINE

## 2021-09-21 PROCEDURE — 258N000003 HC RX IP 258 OP 636: Performed by: EMERGENCY MEDICINE

## 2021-09-21 PROCEDURE — 82075 ASSAY OF BREATH ETHANOL: CPT | Performed by: EMERGENCY MEDICINE

## 2021-09-21 PROCEDURE — 250N000013 HC RX MED GY IP 250 OP 250 PS 637: Performed by: EMERGENCY MEDICINE

## 2021-09-21 RX ORDER — FOLIC ACID 1 MG/1
1 TABLET ORAL ONCE
Status: COMPLETED | OUTPATIENT
Start: 2021-09-21 | End: 2021-09-21

## 2021-09-21 RX ORDER — TRAZODONE HYDROCHLORIDE 100 MG/1
TABLET ORAL
Status: ON HOLD | COMMUNITY
Start: 2021-01-11 | End: 2021-10-08

## 2021-09-21 RX ORDER — MULTIPLE VITAMINS W/ MINERALS TAB 9MG-400MCG
1 TAB ORAL ONCE
Status: COMPLETED | OUTPATIENT
Start: 2021-09-21 | End: 2021-09-21

## 2021-09-21 RX ORDER — LANOLIN ALCOHOL/MO/W.PET/CERES
100 CREAM (GRAM) TOPICAL ONCE
Status: COMPLETED | OUTPATIENT
Start: 2021-09-21 | End: 2021-09-21

## 2021-09-21 RX ADMIN — FOLIC ACID 1 MG: 1 TABLET ORAL at 21:42

## 2021-09-21 RX ADMIN — SODIUM CHLORIDE 1000 ML: 9 INJECTION, SOLUTION INTRAVENOUS at 18:59

## 2021-09-21 RX ADMIN — THIAMINE HCL TAB 100 MG 100 MG: 100 TAB at 21:42

## 2021-09-21 RX ADMIN — MULTIPLE VITAMINS W/ MINERALS TAB 1 TABLET: TAB at 21:42

## 2021-09-21 ASSESSMENT — ENCOUNTER SYMPTOMS
COUGH: 0
HEADACHES: 0
ABDOMINAL PAIN: 0
BACK PAIN: 0
DYSPHORIC MOOD: 0
NECK PAIN: 0
NAUSEA: 0
DIFFICULTY URINATING: 0
EYE REDNESS: 0
SHORTNESS OF BREATH: 0
SORE THROAT: 0
VOMITING: 1
WEAKNESS: 0
DIARRHEA: 1
FEVER: 0
SLEEP DISTURBANCE: 0

## 2021-09-21 ASSESSMENT — MIFFLIN-ST. JEOR: SCORE: 1922.64

## 2021-09-21 NOTE — ED TRIAGE NOTES
Pt is seeking detox from ETOH, last drink was this morning. Pt drinks about 1 pint/day of Fireball.
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
## 2021-09-22 ENCOUNTER — APPOINTMENT (OUTPATIENT)
Dept: GENERAL RADIOLOGY | Facility: CLINIC | Age: 56
DRG: 897 | End: 2021-09-22
Attending: PHYSICIAN ASSISTANT
Payer: COMMERCIAL

## 2021-09-22 PROCEDURE — 128N000004 HC R&B CD ADULT

## 2021-09-22 PROCEDURE — 250N000013 HC RX MED GY IP 250 OP 250 PS 637: Performed by: INTERNAL MEDICINE

## 2021-09-22 PROCEDURE — 93005 ELECTROCARDIOGRAM TRACING: CPT

## 2021-09-22 PROCEDURE — 99223 1ST HOSP IP/OBS HIGH 75: CPT | Mod: AI | Performed by: PSYCHIATRY & NEUROLOGY

## 2021-09-22 PROCEDURE — 250N000013 HC RX MED GY IP 250 OP 250 PS 637: Performed by: PSYCHIATRY & NEUROLOGY

## 2021-09-22 PROCEDURE — 93010 ELECTROCARDIOGRAM REPORT: CPT | Performed by: INTERNAL MEDICINE

## 2021-09-22 PROCEDURE — HZ2ZZZZ DETOXIFICATION SERVICES FOR SUBSTANCE ABUSE TREATMENT: ICD-10-PCS | Performed by: PSYCHIATRY & NEUROLOGY

## 2021-09-22 PROCEDURE — 250N000011 HC RX IP 250 OP 636: Performed by: PSYCHIATRY & NEUROLOGY

## 2021-09-22 PROCEDURE — 73110 X-RAY EXAM OF WRIST: CPT | Mod: 26 | Performed by: RADIOLOGY

## 2021-09-22 PROCEDURE — 73110 X-RAY EXAM OF WRIST: CPT | Mod: RT

## 2021-09-22 RX ORDER — ONDANSETRON 4 MG/1
4 TABLET, ORALLY DISINTEGRATING ORAL EVERY 6 HOURS PRN
Status: DISCONTINUED | OUTPATIENT
Start: 2021-09-22 | End: 2021-09-24 | Stop reason: HOSPADM

## 2021-09-22 RX ORDER — LOPERAMIDE HCL 2 MG
2 CAPSULE ORAL 4 TIMES DAILY PRN
Status: DISCONTINUED | OUTPATIENT
Start: 2021-09-22 | End: 2021-09-24 | Stop reason: HOSPADM

## 2021-09-22 RX ORDER — AMOXICILLIN 250 MG
1 CAPSULE ORAL 2 TIMES DAILY PRN
Status: DISCONTINUED | OUTPATIENT
Start: 2021-09-22 | End: 2021-09-24 | Stop reason: HOSPADM

## 2021-09-22 RX ORDER — DIAZEPAM 5 MG
5-20 TABLET ORAL EVERY 30 MIN PRN
Status: DISCONTINUED | OUTPATIENT
Start: 2021-09-22 | End: 2021-09-24 | Stop reason: HOSPADM

## 2021-09-22 RX ORDER — TRAZODONE HYDROCHLORIDE 100 MG/1
100 TABLET ORAL
Status: DISCONTINUED | OUTPATIENT
Start: 2021-09-22 | End: 2021-09-24 | Stop reason: HOSPADM

## 2021-09-22 RX ORDER — LANOLIN ALCOHOL/MO/W.PET/CERES
100 CREAM (GRAM) TOPICAL DAILY
Status: DISCONTINUED | OUTPATIENT
Start: 2021-09-22 | End: 2021-09-24 | Stop reason: HOSPADM

## 2021-09-22 RX ORDER — PANTOPRAZOLE SODIUM 40 MG/1
40 TABLET, DELAYED RELEASE ORAL DAILY
Status: DISCONTINUED | OUTPATIENT
Start: 2021-09-22 | End: 2021-09-24 | Stop reason: HOSPADM

## 2021-09-22 RX ORDER — ALBUTEROL SULFATE 90 UG/1
2 AEROSOL, METERED RESPIRATORY (INHALATION) EVERY 6 HOURS PRN
Status: DISCONTINUED | OUTPATIENT
Start: 2021-09-22 | End: 2021-09-24 | Stop reason: HOSPADM

## 2021-09-22 RX ORDER — HYDROXYZINE HYDROCHLORIDE 25 MG/1
25 TABLET, FILM COATED ORAL EVERY 4 HOURS PRN
Status: DISCONTINUED | OUTPATIENT
Start: 2021-09-22 | End: 2021-09-24 | Stop reason: HOSPADM

## 2021-09-22 RX ORDER — HYDRALAZINE HYDROCHLORIDE 25 MG/1
25 TABLET, FILM COATED ORAL 4 TIMES DAILY PRN
Status: DISCONTINUED | OUTPATIENT
Start: 2021-09-22 | End: 2021-09-24 | Stop reason: HOSPADM

## 2021-09-22 RX ORDER — FOLIC ACID 1 MG/1
1 TABLET ORAL DAILY
Status: DISCONTINUED | OUTPATIENT
Start: 2021-09-22 | End: 2021-09-24 | Stop reason: HOSPADM

## 2021-09-22 RX ORDER — DIAZEPAM 5 MG
5-20 TABLET ORAL EVERY 30 MIN PRN
Status: DISCONTINUED | OUTPATIENT
Start: 2021-09-22 | End: 2021-09-22

## 2021-09-22 RX ORDER — IBUPROFEN 800 MG/1
800 TABLET, FILM COATED ORAL 2 TIMES DAILY
Status: ON HOLD | COMMUNITY
End: 2021-09-24

## 2021-09-22 RX ORDER — ACETAMINOPHEN 325 MG/1
650 TABLET ORAL EVERY 4 HOURS PRN
Status: DISCONTINUED | OUTPATIENT
Start: 2021-09-22 | End: 2021-09-24 | Stop reason: HOSPADM

## 2021-09-22 RX ORDER — MULTIPLE VITAMINS W/ MINERALS TAB 9MG-400MCG
1 TAB ORAL DAILY
Status: DISCONTINUED | OUTPATIENT
Start: 2021-09-22 | End: 2021-09-24 | Stop reason: HOSPADM

## 2021-09-22 RX ORDER — MAGNESIUM HYDROXIDE/ALUMINUM HYDROXICE/SIMETHICONE 120; 1200; 1200 MG/30ML; MG/30ML; MG/30ML
30 SUSPENSION ORAL EVERY 4 HOURS PRN
Status: DISCONTINUED | OUTPATIENT
Start: 2021-09-22 | End: 2021-09-24 | Stop reason: HOSPADM

## 2021-09-22 RX ADMIN — HYDRALAZINE HYDROCHLORIDE 25 MG: 25 TABLET ORAL at 18:10

## 2021-09-22 RX ADMIN — TRAZODONE HYDROCHLORIDE 100 MG: 100 TABLET ORAL at 21:38

## 2021-09-22 RX ADMIN — DIAZEPAM 5 MG: 5 TABLET ORAL at 12:34

## 2021-09-22 RX ADMIN — ONDANSETRON 4 MG: 4 TABLET, ORALLY DISINTEGRATING ORAL at 16:44

## 2021-09-22 RX ADMIN — ACETAMINOPHEN 650 MG: 325 TABLET, FILM COATED ORAL at 16:44

## 2021-09-22 RX ADMIN — ACETAMINOPHEN 650 MG: 325 TABLET, FILM COATED ORAL at 20:42

## 2021-09-22 RX ADMIN — VORTIOXETINE 10 MG: 5 TABLET, FILM COATED ORAL at 08:13

## 2021-09-22 RX ADMIN — DIAZEPAM 10 MG: 5 TABLET ORAL at 20:42

## 2021-09-22 RX ADMIN — DIAZEPAM 10 MG: 5 TABLET ORAL at 05:56

## 2021-09-22 RX ADMIN — DIAZEPAM 10 MG: 5 TABLET ORAL at 02:57

## 2021-09-22 RX ADMIN — DIAZEPAM 5 MG: 5 TABLET ORAL at 16:44

## 2021-09-22 RX ADMIN — ONDANSETRON 4 MG: 4 TABLET, ORALLY DISINTEGRATING ORAL at 08:14

## 2021-09-22 RX ADMIN — DIAZEPAM 10 MG: 5 TABLET ORAL at 10:55

## 2021-09-22 RX ADMIN — DIAZEPAM 10 MG: 5 TABLET ORAL at 19:33

## 2021-09-22 RX ADMIN — PANTOPRAZOLE SODIUM 40 MG: 40 TABLET, DELAYED RELEASE ORAL at 08:14

## 2021-09-22 RX ADMIN — DIAZEPAM 10 MG: 5 TABLET ORAL at 08:14

## 2021-09-22 RX ADMIN — FOLIC ACID 1 MG: 1 TABLET ORAL at 08:13

## 2021-09-22 ASSESSMENT — ACTIVITIES OF DAILY LIVING (ADL)
NUMBER_OF_TIMES_PATIENT_HAS_FALLEN_WITHIN_LAST_SIX_MONTHS: 1
ORAL_HYGIENE: INDEPENDENT
DRESS: INDEPENDENT
FALL_HISTORY_WITHIN_LAST_SIX_MONTHS: YES
DRESS: INDEPENDENT;SCRUBS (BEHAVIORAL HEALTH)
DIFFICULTY_COMMUNICATING: NO
DIFFICULTY_EATING/SWALLOWING: NO
DOING_ERRANDS_INDEPENDENTLY_DIFFICULTY: NO
ORAL_HYGIENE: INDEPENDENT
LAUNDRY: UNABLE TO COMPLETE
DRESSING/BATHING_DIFFICULTY: NO
HYGIENE/GROOMING: INDEPENDENT
WALKING_OR_CLIMBING_STAIRS_DIFFICULTY: NO
CONCENTRATING,_REMEMBERING_OR_MAKING_DECISIONS_DIFFICULTY: NO
WEAR_GLASSES_OR_BLIND: NO
LAUNDRY: WITH SUPERVISION
TOILETING_ISSUES: NO
LAUNDRY: UNABLE TO COMPLETE
ORAL_HYGIENE: INDEPENDENT
HYGIENE/GROOMING: INDEPENDENT
DRESS: STREET CLOTHES
HYGIENE/GROOMING: INDEPENDENT

## 2021-09-22 NOTE — PROGRESS NOTES
09/22/21 0219   Patient Belongings   Did you bring any home meds/supplements to the hospital?  Yes   Disposition of meds  Sent to security/pharmacy per site process;Other (see comment)   Patient Belongings other (see comments)   Patient Belongings Put in Hospital Secure Location (Security or Locker, etc.) other (see comments)   Belongings Search Yes   Clothing Search Yes   Second Staff Last   Comment See Notes     Large  - 2 bags, assorted clothes, toiletries, shoes,  Small:  -Phone, wallet  Security:  - $56 cash  -2 Visa cards  1 Master card  [place] 's license  A               Admission:  I am responsible for any personal items that are not sent to the safe or pharmacy.  [facility] is not responsible for loss, theft or damage of any property in my possession.    Signature:  _________________________________ Date: _______  Time: _____                                              Staff Signature:  ____________________________ Date: ________  Time: _____      2nd Staff person, if patient is unable/unwilling to sign:    Signature: ________________________________ Date: ________  Time: _____     Discharge:  [facility] has returned all of my personal belongings:    Signature: _________________________________ Date: ________  Time: _____                                          Staff Signature:  ____________________________ Date: ________  Time: _____

## 2021-09-22 NOTE — TELEPHONE ENCOUNTER
Patient cleared and ready for behavioral bed placement: Yes     S:  9:36 PM Dr Forde from [place] ED presented 56/M Pt for Detox    B:  Pt came to ED asking for detox from alcohol.  Pt reports starting drinking again about 1 or so weeks ago.  Pt reports doing detox in early Sept and was sober for about 1 week.  Pt reports drinking approximately 1 quart of Fireball whiskey and several White Claws daily.  Pt reported drinking this morning.  Pt reports a hx of withdraw seizures with the last one approximately 6 years ago. ED staff have seen no signs of seizures.  Pt denies any drug use.  Pt reports no SI/MH.  Pt is voluntary and cooperative.  Pt is Covid vaccinated.  Pt is able to walk and and be independent.  Pt is medically cleared.    Urea Nitrogen 7 - 30 mg/dL 6Low     Creatinine 0.66 - 1.25 mg/dL 0.63Low     Calcium 8.5 - 10.1 mg/dL 7.7Low     Glucose 70 - 99 mg/dL 104High         Breathalyzer =  .236 at 6:02 pm  Utox = + for Benzodiazepines  Covid = Negative    A:  Voluntary    R:  [facility]    Added to Worklist and Looked at Detox openings.  10:31 PM Paged Provider on Call    10:37 PM On Call Provider David approved for 3A/[facility]    Updated Work List and Admit Booard    10:51 PM Called 3A Unit and left message for Charge Nurse  10:54 PM Updated 3A.  Stated that they will call ED but transfer will happen after midnight.  10:55 PM Called ED

## 2021-09-22 NOTE — PROVIDER NOTIFICATION
S:  Pt obtained VS and BP was elevated at 16:26 (174/102).  Pt was medicated with diazepam 5 mg.  An hour later a recheck of the BP demonstrated no improvement (174/105).  Pt had no PRN anti-hypertensives available.  Writer contacted the on call provider, Dr Garcia, and received an order for hydralazine 25 mg PRN.  Awaiting pharmacy approval.  B:  Pt admitted for alcohol withdrawal and detoxification.  A:  Pt with moderately elevated BP in the setting of moderate to severe alcohol withdrawal.  R:  Administered hydralazine 25 mg once with some improvement in BP-172/89.  Diazepam given at that time.  BP one hour post recheck showed mixed results 159/100, but just under the parameter for giving another dose.  Continue to monitor and medicate as ordered and indicated.

## 2021-09-22 NOTE — PROGRESS NOTES
Behavioral  Pt appeared sleeping comfortably after admission overnight; breathing was even and unlabored.      Medical  Pt continues in alcohol withdrawal; MSSA  8 and 10;  10 x 2 of valium given this shift; pt tremulous, tachycardic and diaphoretic.     No new medical concerns noted.
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
## 2021-09-22 NOTE — PLAN OF CARE
Case Management Note  9/22/2021    Met with patient. He is requesting [facility] access hospital after detox. He needs an assessment. Was instructed to complete the forms in his folder today for an assessment.      ROD Michaud, LGSW, LADC

## 2021-09-22 NOTE — PLAN OF CARE
Problem: Substance Withdrawal  Signs and symptoms of listed problems will be absent or manageable.   SBAR    S = Situation:   Voluntary admit  B  = Background:   Mark Berumen is a 56 year old male with a history of asthma and depression who presented to the emergency department seeking detox from alcohol (blew 0.236) . He been drinking alcohol daily for a week (last admitted to  9/2/21-9/6/21).  He states he drinks a quart of fireball as well as several white claws per day.  He has a history of alcohol withdrawal including seizures approximately 6 years ago.   A  =  Assessment:   Vital Signs: /109, HR 99, Temp 98.2; MSSA 8;    Notable labs: UTOX positive for benzodiazepines; COVID Negative; RDW 15.9 (H), creatinine 0.63 (L), Urea Nitrogen 6 (L), calcium 77 (L).    Pleasant and cooperative upon approach; Alert and oriented X 3; Denies SI, SIB, HI, and HA. Affect bright; mood anxious; eye contact good; speech appropriate in context; appearance disheveled;  Gait steady; insight judgment.    Patient c/o right medial wrist pain; swelling and tenderness noted at the site. Pt indicated he injured his wrist three weeks ago.   R =   Request or Recommendation:   Alcohol withdrawal monitoring, Seizure and withdrawal precautions; Dr. Saucedo to evaluate, case management to see--pt reports he would like to go to [facility] after detox.

## 2021-09-22 NOTE — PROGRESS NOTES
PDMP as of 9/22/2021:     Mark Berumen  Risk Indicators  NARX SCORES  Narcotic  080  Sedative  050  Stimulant  000  Explanation and GuidanceOVERDOSE RISK SCORE 200  (Range 000-999)  Explanation and Guidance  ADDITIONAL RISK INDICATORS (0)  Explanation and GuidanceThis NarxCare report is based on search criteria supplied and the data entered by the dispensing pharmacy. For more information about any prescription, please contact the dispensing pharmacy or the prescriber. NarxCare scores and reports are intended to aid, not replace, medical decision making. None of the information presented should be used as sole justification for providing or refusing to provide medications. The information on this report is not warranted as accurate or complete.  Graphs  RX GRAPH   Narcotic  Buprenorphine  Sedative  Stimulant  Other  All Prescribers  Prescribers  2 - Otis Ontiveros,  1 - Ciro Godwin,  Timeline  09/22  2m  6m  1y  2y  Buprenorphine mg  16  4  0  28  Timeline  09/22  2m  6m  1y  2y  Morphine MgEq (MME)  200  80  0  320  Timeline  09/22  2m  6m  1y  2y  Lorazepam MgEq (LME)  10  2  0  18  Timeline 09/22  2m  6m  1y  2y  *Per CDC guidance, the MME conversion factors prescribed or provided as part of the medication-assisted treatment for opioid use disorder should not be used to benchmark against dosage thresholds meant for opioids prescribed for pain. Buprenorphine products have no agreed upon morphine equivalency, and as partial opioid agonists, are not expected to be associated with overdose risk in the same dose-dependent manner as doses for full agonist opioids. MME = morphine milligram equivalents. LME = Lorazepam milligram equivalents. mg = dose in milligrams.  Summary  Summary  Total Prescriptions:  10  Total Prescribers:  2  Total Pharmacies:  2  Narcotics* (excluding Buprenorphine)  Current Qty:  0  Current MME/day:  0.00  30 Day Avg MME/day:  1.67  Sedatives*  Current Qty:  0  Current LME/day:  0.00  30  Day Avg LME/day:  0.00  Buprenorphine*  Current Qty:  0  Current mg/day:  0.00  30 Day Avg mg/day:  0.00  Rx Data  PRESCRIPTIONS  Total Prescriptions: 10  Total Private Pay: 0  Fill Date ID Written Sold Drug Qty Days Prescriber Rx # Pharmacy Refill Daily Dose * Pymt Type   08/29/2021 1 08/28/2021 08/29/2021 Hydrocodone-Acetamin 5-325 Mg  10.00 2 Ma Her 2000710 [facility] (1273) 0/0 25.00 MME Medicaid [place]  04/15/2021 4 01/26/2021 04/15/2021 Gabapentin 300 Mg Capsule  60.00 30 Mi Brian [phone] [facility] (9445) 2/2  Medicaid [place]  03/18/2021 3 01/26/2021 03/18/2021 Gabapentin 300 Mg Capsule  60.00 30 Mi Brian [id number] [facility] (9445) 1/2  [place]icaid [place]  02/23/2021 2 02/15/2021 02/23/2021 Gabapentin 300 Mg Capsule  60.00 30 Mi Brian [id number] [facility] (9445) 0/0  [place]icaid [place]  02/01/2021 2 01/26/2021 02/01/2021 Gabapentin 300 Mg Capsule  60.00 30 Mi Brian [id number] [facility] (9445) 0/2  [place]icaid [place]  01/11/2021 2 11/04/2020 01/11/2021 Gabapentin 300 Mg Capsule  60.00 30 Mi Brian [id number] [facility] (9445) 2/2  [place]icaid [place]  12/14/2020 2 11/04/2020 12/14/2020 Gabapentin 300 Mg Capsule  60.00 30 Mi Brian [id number] [facility] (9445) 1/2  [place]icaid [place]  11/16/2020 2 11/04/2020 11/16/2020 Gabapentin 300 Mg Capsule  60.00 30 Mi Brian [id number] [facility] (9445) 0/2  Comm [place]  10/20/2020 2 09/09/2020 10/20/2020 Gabapentin 300 Mg Capsule  60.00 30 Mi Brian [id number] [facility] (9445) 1/1  [place]  09/23/2020 2 09/09/2020 09/23/2020 Gabapentin 300 Mg Capsule  60.00 30 Mi Brian [phone] [facility] (9445) 0/1  [place]  *Per CDC guidance, the MME conversion factors prescribed or provided as part of the medication-assisted treatment for opioid use disorder should not be used to benchmark against dosage thresholds meant for opioids prescribed for pain. Buprenorphine products have no agreed upon morphine equivalency, and as partial opioid agonists, are not expected to be associated with overdose risk in the same dose-dependent manner as doses for full agonist opioids. MME = morphine milligram equivalents. LME = Lorazepam  milligram equivalents. mg = dose in milligrams.  Providers  Total Providers: 2  Name Address [facility] Zipcode Phone  Otis Ontiveros  [address] [facility] [address] [phone]  Kevin De Oliveira, Cnp [address] [place] [address] [phone]  Pharmacies  Total Pharmacies: 2  Name Address [facility] Zipcode Phone  [facility] ([phone]) [address] [facility] [address] [phone]  [employer] [phone] [place] [address] [place] [address] [phone]  The report provided is based upon the search criteria entered and the corresponding data as it has been reported by dispenser(s). If erroneous information is identified or additional information is needed, please contact the dispenser or the prescriber provided on the report. Date Sold signifies the date the prescription was sold (left the pharmacy). The absence of Date Sold does not necessarily indicate the prescription was not dispensed. Fill Date represents the date the medication was filled or prepared by the pharmacy. Note, federal regulation (CFR Title 42: Part 2) requires patient consent prior to releasing certain patient data from federally funded opioid treatment programs (OTPs). As such, controlled substances dispensed from OTPs for medication-assisted treatment may not appear in the [place]  report. Morphine milligram equivalent (MME) conversion factors published by the CDC are used in the MME calculation. Per the CDC, the MME conversion factor is intended only for analytic purposes where prescription data are used to retrospectively calculate daily MME to inform analyses of risks associated with opioid prescribing. This value does not constitute clinical guidance or recommendations for converting patients from one form of opioid analgesic to another. Per the CDC, the conversion factors for drugs prescribed or provided, as part of medication-assisted treatment for opioid use disorder should not be used to  benchmark against MME dosage thresholds meant for opioids prescribed for pain. Buprenorphine products listed in the CDC s MME file do not have an associated conversion factor. Lastly, the CDC notes, in clinical practice, calculating MME for methadone often involves a sliding-scale approach, whereby the conversion factor increases with increasing dose. The conversion factor of 3 for methadone presented in this file could underestimate MME for a given patient. This report contains confidential information, including patient identifiers, and is not a public record. The information on this report must be treated as protected health information and is only to be disclosed to others as authorized by applicable state and Federal regulations.

## 2021-09-22 NOTE — PHARMACY-ADMISSION MEDICATION HISTORY
Admission Medication History Completed by Pharmacy    See [facility] Admission Navigator for allergy information, preferred outpatient pharmacy, prior to admission medications and immunization status.     Medication History Sources:     Surescripts (fill history), CareEverywhere, and patient interview (via telephone)    Changes made to PTA medication list (reason):    Added: per patient  o OTC ibuprofen - taking twice daily every day for back and wrist pain    Deleted: None    Changed: None    Additional Information:    Patient reports needing refills on his medications.    Rx records from 9/6/21 - patient reports not taking these medications prior to admission.    Prior to Admission medications    Medication Sig Last Dose Taking? Auth Provider   albuterol (PROAIR HFA/PROVENTIL HFA/VENTOLIN HFA) 108 (90 Base) MCG/ACT inhaler Inhale 2 puffs into the lungs every 6 hours as needed for shortness of breath / dyspnea or wheezing 9/21/2021 at Unknown time Yes Leatha Hugo,    ibuprofen (ADVIL/MOTRIN) 800 MG tablet Take 800 mg by mouth 2 times daily 9/21/2021 at Unknown time Yes Unknown, Entered By History   pantoprazole (PROTONIX) 40 MG EC tablet Take 40 mg by mouth daily 9/21/2021 at Unknown time Yes Unknown, Entered By History   traZODone (DESYREL) 100 MG tablet TAKE 1 TABLET BY MOUTH EVERY NIGHT AT BEDTIME AS NEEDED 9/20/2021 at Unknown time Yes Reported, Patient   vortioxetine (TRINTELLIX) 10 MG tablet Take 10 mg by mouth daily 9/21/2021 at Unknown time Yes Unknown, Entered By History   folic acid (FOLVITE) 1 MG tablet Take 1 tablet (1 mg) by mouth daily   Vipin Saucedo MD   multivitamin w/minerals (THERA-VIT-M) tablet Take 1 tablet by mouth daily   Vipin Saucedo MD   thiamine (B-1) 100 MG tablet Take 1 tablet (100 mg) by mouth daily   Vipin Saucedo MD       Date completed: 09/22/21    Medication history completed by: I Nicollette McMann, PharmD  [facility] -  [facility] [place]  Emergency Department: Ascom *[id number]

## 2021-09-22 NOTE — PLAN OF CARE
"Pt continues to be monitored for ETOH withdrawal, MSSA scores this shift  15, 8, and  9. Pt received valium for withdrawal symptoms, 10 mg at 0814, 10 mg at 1050, and 5 mg 1245. Pt shows improvement and reports \"feeling better.\" Total of 45 mg of valium since admission. Pt declined breakfast due to nausea (prn zofran 4 mg given), ate 75% of lunch (reported nausea and small emesis). Pt provided sea band and lynsey chew which was effective allowing pt to sleep. Pt drinking fluids. Hospitalist order to see pt for swollen right wrist d/t fall before admission, and hypertension 179/100. Pt taken off unit for xray of right wrist, swollen d/t fall before admission. Trintellix has been modified, increased to 20 mg daily from 10 mg. Pharmacy has sent medication to unit and it is in pt medication bin.    Problem: Adult Inpatient Plan of Care  Goal: Plan of Care Review  Outcome: Improving  Flowsheets (Taken 9/22/2021 1030)  Plan of Care Reviewed With: patient     Problem: Adult Inpatient Plan of Care  Goal: Patient-Specific Goal (Individualized)  Outcome: Improving     Problem: Adult Inpatient Plan of Care  Goal: Optimal Comfort and Wellbeing  Outcome: Improving  Intervention: Provide Person-Centered Care  Recent Flowsheet Documentation  Taken 9/22/2021 1030 by Taylor Solis, RN  Trust Relationship/Rapport:    care explained    choices provided    emotional support provided    empathic listening provided    questions answered    questions encouraged    reassurance provided    thoughts/feelings acknowledged     Problem: Suicidal Behavior  Goal: Suicidal Behavior is Absent or Managed  Outcome: Improving     Problem: Alcohol Withdrawal  Goal: Alcohol Withdrawal Symptom Control  Outcome: Improving     "

## 2021-09-22 NOTE — PLAN OF CARE
Behavioral Team Discussion: (9/22/2021)    Continued Stay Criteria/Rationale: Patient admitted for alcohol withdrawal, complicated.  Plan: The following services will be provided to the patient; psychiatric assessment, medication management, therapeutic milieu, individual and group support, and skills groups.   Participants: 3A Provider: Dr. Jaguar MD; 3A RN's: Taylor Solis RN; 3A CM's: Venita Pickard [facility]  Summary/Recommendation: Providers will assess today for treatment recommendations, discharge planning, and aftercare plans. CM will meet with pt for discharge planning.   Medical/Physical: Internal medicine consult to be completed 9/22/2021.  Precautions:   Behavioral Orders   Procedures     Code 1 - Restrict to Unit     Routine Programming     As clinically indicated     Status 15     Every 15 minutes.     Withdrawal precautions     Rationale for change in precautions or plan: N/A  Progress: No Change.

## 2021-09-23 LAB
ATRIAL RATE - MUSE: 97 BPM
DIASTOLIC BLOOD PRESSURE - MUSE: NORMAL MMHG
INTERPRETATION ECG - MUSE: NORMAL
P AXIS - MUSE: 57 DEGREES
PR INTERVAL - MUSE: 158 MS
QRS DURATION - MUSE: 88 MS
QT - MUSE: 364 MS
QTC - MUSE: 462 MS
R AXIS - MUSE: 35 DEGREES
SYSTOLIC BLOOD PRESSURE - MUSE: NORMAL MMHG
T AXIS - MUSE: 39 DEGREES
VENTRICULAR RATE- MUSE: 97 BPM

## 2021-09-23 PROCEDURE — H0001 ALCOHOL AND/OR DRUG ASSESS: HCPCS

## 2021-09-23 PROCEDURE — H2032 ACTIVITY THERAPY, PER 15 MIN: HCPCS

## 2021-09-23 PROCEDURE — 99222 1ST HOSP IP/OBS MODERATE 55: CPT | Performed by: PHYSICIAN ASSISTANT

## 2021-09-23 PROCEDURE — 128N000004 HC R&B CD ADULT

## 2021-09-23 PROCEDURE — 250N000013 HC RX MED GY IP 250 OP 250 PS 637: Performed by: INTERNAL MEDICINE

## 2021-09-23 PROCEDURE — 250N000013 HC RX MED GY IP 250 OP 250 PS 637: Performed by: PSYCHIATRY & NEUROLOGY

## 2021-09-23 PROCEDURE — 250N000013 HC RX MED GY IP 250 OP 250 PS 637: Performed by: PHYSICIAN ASSISTANT

## 2021-09-23 PROCEDURE — 99232 SBSQ HOSP IP/OBS MODERATE 35: CPT | Performed by: PSYCHIATRY & NEUROLOGY

## 2021-09-23 RX ORDER — IBUPROFEN 600 MG/1
600 TABLET, FILM COATED ORAL EVERY 6 HOURS PRN
Status: DISCONTINUED | OUTPATIENT
Start: 2021-09-23 | End: 2021-09-24 | Stop reason: HOSPADM

## 2021-09-23 RX ADMIN — DIAZEPAM 10 MG: 5 TABLET ORAL at 16:23

## 2021-09-23 RX ADMIN — HYDRALAZINE HYDROCHLORIDE 25 MG: 25 TABLET ORAL at 16:23

## 2021-09-23 RX ADMIN — THIAMINE HCL TAB 100 MG 100 MG: 100 TAB at 08:33

## 2021-09-23 RX ADMIN — DIAZEPAM 10 MG: 5 TABLET ORAL at 04:15

## 2021-09-23 RX ADMIN — IBUPROFEN 600 MG: 600 TABLET ORAL at 21:50

## 2021-09-23 RX ADMIN — FOLIC ACID 1 MG: 1 TABLET ORAL at 08:33

## 2021-09-23 RX ADMIN — ACETAMINOPHEN 650 MG: 325 TABLET, FILM COATED ORAL at 08:33

## 2021-09-23 RX ADMIN — ACETAMINOPHEN 650 MG: 325 TABLET, FILM COATED ORAL at 16:23

## 2021-09-23 RX ADMIN — PANTOPRAZOLE SODIUM 40 MG: 40 TABLET, DELAYED RELEASE ORAL at 08:33

## 2021-09-23 RX ADMIN — DIAZEPAM 10 MG: 5 TABLET ORAL at 12:16

## 2021-09-23 RX ADMIN — ACETAMINOPHEN 650 MG: 325 TABLET, FILM COATED ORAL at 12:16

## 2021-09-23 RX ADMIN — DIAZEPAM 10 MG: 5 TABLET ORAL at 08:33

## 2021-09-23 RX ADMIN — IBUPROFEN 600 MG: 600 TABLET ORAL at 10:34

## 2021-09-23 RX ADMIN — TRAZODONE HYDROCHLORIDE 100 MG: 100 TABLET ORAL at 21:50

## 2021-09-23 RX ADMIN — IBUPROFEN 600 MG: 600 TABLET ORAL at 16:23

## 2021-09-23 RX ADMIN — VORTIOXETINE 20 MG: 10 TABLET, FILM COATED ORAL at 08:32

## 2021-09-23 RX ADMIN — DIAZEPAM 10 MG: 5 TABLET ORAL at 00:14

## 2021-09-23 RX ADMIN — MULTIPLE VITAMINS W/ MINERALS TAB 1 TABLET: TAB at 08:33

## 2021-09-23 RX ADMIN — ACETAMINOPHEN 650 MG: 325 TABLET, FILM COATED ORAL at 20:04

## 2021-09-23 ASSESSMENT — ACTIVITIES OF DAILY LIVING (ADL)
DRESS: INDEPENDENT
DRESS: INDEPENDENT
HYGIENE/GROOMING: INDEPENDENT
LAUNDRY: WITH SUPERVISION
LAUNDRY: WITH SUPERVISION
ORAL_HYGIENE: INDEPENDENT
HYGIENE/GROOMING: INDEPENDENT
ORAL_HYGIENE: INDEPENDENT

## 2021-09-23 NOTE — PLAN OF CARE
Pt continues to be monitored for alcohol withdrawal. MSSA scores this shift 10 and 8. Pt endorses moderate anxiety 6/10 and moderate depression 5/10, states his mood is improving. Pt has pain in right wrist d/t fracture, 6/10, PRN ibuprofen and tylenol administered which was helpful. Pt completed CD assessment, pt hopes to attend [facility] treatment on discharge.

## 2021-09-23 NOTE — PROGRESS NOTES
"[facility], [place]   Psychiatric Progress Note  Hospital Day: 1        Interim History:   The patient's care was discussed with the treatment team during the daily team meeting and/or staff's chart notes were reviewed.  Staff report patient has required a total of 100 mg of Valium since admission thus far. Patient did report acute medical concerns, including pain in right wrist, poor appetite, nasea. Hospitalist order to see pt for swollen right wrist d/t fall before admission, and hypertension 179/100. Pt taken off unit for xray of right wrist, swollen d/t fall before admission. X-Ray revealed undisplaced fracture at the styloid process of the radius. Patient is not exhibiting signs/sx of psychosis or ekaterina. Patient is medication adherent. Patient is attending groups. Patient is sleeping relatively well. Patient is attending to ADLs. He denies SI/SIB/HI.     Upon interview, the patient reports that he is feeling better today; \"a little shaky and sweaty, but no more nausea.\" Anxiety is still \"a little up there.\" Plan is to go to [facility] and completed CD assessment today.     Suicidal ideation: denies current or recent suicidal ideation or behaviors.    Homicidal ideation: denies current or recent homicidal ideation or behaviors.    Psychotic symptoms: Patient denies AH, VH, paranoia, delusions.     Medication side effects reported: No significant side effects.    Acute medical concerns: yes, ongoing right wrist pain and above.     Other issues reported by patient: Patient had no further questions or concerns.           Medications:       folic acid  1 mg Oral Daily     multivitamin w/minerals  1 tablet Oral Daily     pantoprazole  40 mg Oral Daily     thiamine  100 mg Oral Daily     vortioxetine  20 mg Oral Daily          Allergies:   No Known Allergies       Labs:     Recent Results (from the past 24 hour(s))   EKG 12-lead, complete    Collection Time: 09/22/21  2:05 PM " "  Result Value Ref Range    Systolic Blood Pressure  mmHg    Diastolic Blood Pressure  mmHg    Ventricular Rate 97 BPM    Atrial Rate 97 BPM    DC Interval 158 ms    QRS Duration 88 ms     ms    QTc 462 ms    P Axis 57 degrees    R AXIS 35 degrees    T Axis 39 degrees    Interpretation ECG       Sinus rhythm  Normal ECG  When compared with ECG of 15-MAR-2019 10:48,  No significant change was found            Psychiatric Examination:     BP (!) 149/92   Pulse 94   Temp 97.9  F (36.6  C) (Temporal)   Resp 16   Ht 1.778 m (5' 10\")   Wt 107.6 kg (237 lb 4.8 oz)   SpO2 94%   BMI 34.05 kg/m    Weight is 237 lbs 4.8 oz  Body mass index is 34.05 kg/m .    Weight over time:  Vitals:    09/21/21 1756   Weight: 107.6 kg (237 lb 4.8 oz)       Orthostatic Vitals     None            Cardiometabolic risk assessment. 09/23/21      Reviewed patient profile for cardiometabolic risk factors    Date taken /Value  REFERENCE RANGE   Abdominal Obesity  (Waist Circumference)   See nursing flowsheet Women ?35 in (88 cm)   Men ?40 in (102 cm)      Triglycerides  Triglycerides   Date Value Ref Range Status   09/03/2021 289 (H) <150 mg/dL Final     Comment:     0-9 years:  Normal:    Less than 75 mg/dL  Borderline high:  75-99 mg/dL  High:             Greater than or equal to 100 mg/dL    0-19 years:  Normal:    Less than 90 mg/dL  Borderline high:   mg/dL  High:             Greater than or equal to 130 mg/dL    20 years and older:  Normal:    Less than 150 mg/dL  Borderline high:  150-199 mg/dL  High:             200-499 mg/dL  Very high:   Greater than or equal to 500 mg/dL       ?150 mg/dL (1.7 mmol/L) or current treatment for elevated triglycerides   HDL cholesterol  Direct Measure HDL   Date Value Ref Range Status   09/03/2021 49 >=40 mg/dL Final     Comment:     0-19 years:       Greater than or equal to 45 mg/dL   Low: Less than 40 mg/dL   Borderline low: 40-44 mg/dL     20 years and older:   Female: Greater than or " equal to 50 mg/dL   Male:   Greater than or equal to 40 mg/dL        ]   Women <50 mg/dL (1.3 mmol/L) in women or current treatment for low HDL cholesterol  Men <40 mg/dL (1 mmol/L) in men or current treatment for low HDL cholesterol     Fasting plasma glucose (FPG) Lab Results   Component Value Date     09/21/2021     03/15/2019      FPG ?100 mg/dL (5.6 mmol/L) or treatment for elevated blood glucose   Blood pressure  BP Readings from Last 3 Encounters:   09/23/21 (!) 149/92   09/06/21 (!) 143/80   03/18/19 131/74    Blood pressure ?130/85 mmHg or treatment for elevated blood pressure   Family History  See family history     Appearance: awake, alert and adequately groomed  Attitude:  cooperative  Eye Contact:  good  Mood:  better  Affect:  appropriate and in normal range  Speech:  clear, coherent  Language: fluent and intact in English  Psychomotor, Gait, Musculoskeletal:  no evidence of tardive dyskinesia, dystonia, or tics  Throught Process:  logical, linear and goal oriented  Associations:  no loose associations  Thought Content:  no evidence of suicidal ideation or homicidal ideation and no evidence of psychotic thought  Insight:  good  Judgement:  intact  Oriented to:  time, person, and place  Attention Span and Concentration:  intact  Recent and Remote Memory:  intact  Fund of Knowledge:  appropriate    Clinical Global Impressions  First:  Considering your total clinical experience with this particular patient population, how severe are the patient's symptoms at this time?: 5 (09/22/21 1248)  Compared to the patient's condition at the START of treatment, this patient's condition is: 3 (09/22/21 1248)  Most recent:  Considering your total clinical experience with this particular patient population, how severe are the patient's symptoms at this time?: 5 (09/22/21 1248)  Compared to the patient's condition at the START of treatment, this patient's condition is: 3 (09/22/21 1248)            Precautions:     Behavioral Orders   Procedures     Code 1 - Restrict to Unit     Code 2     Routine Programming     As clinically indicated     Status 15     Every 15 minutes.     Withdrawal precautions          Diagnoses:     Alcohol Use Disorder, severe, in withdrawal, complicated by hx of withdrawal seizure  MDD, recurrent, moderate  Anxiety, unspecified. R/O Panic Disorder  Nicotine Use Disorder, mild to moderate  Opiate Use Disorder, in full sustained remission  Gambling Use Disorder, moderate severity  Prolonged QTc         Assessment & Plan:     Assessment and hospital summary:  This patient is a 56 year old  male with history of severe alcohol use disorder, depression, and anxiety who presented to ED seeking detox from alcohol. Family history significant for severe alcoholism. Psychosocial stressors include: financial strain. Patient was admitted on a voluntary basis to [facility] detox. Patient was started on MSSA protocol using Valium. Pt  does have a history of seizure, but not DTs. Thiamine, folic acid, and multivitamin were ordered on admission. IM consult placed for co-management of care. Symptoms and presentation at this time is most consistent with Alcohol Use Disorder, severe, in withdrawal, complicated by hx of seizure. I have discussed the risks, benefits, and alternatives of Trintellix, which will be increased at this time to target moderately severe sx of depression that have persisted despite extended periods of sobriety on current dose of Trintellix. Patient is amenable to this plan. Patient will likely benefit from residential CD treatment upon discharge. Pt preference is [facility] CD program. CTC will be meeting with patient to discuss dispo plan. Will consider anti-craving medications prior to discharge. Inpatient psychiatric hospitalization is warranted at this time for safety, stabilization, and possible adjustment in medications.    Psychiatric  treatment/inteventions:  Alcohol Withdrawal:  - Continue MSSA protocol using Valium for management of alcohol withdrawal  - Continue thiamine, folate, and multivitamin daily  - Consider anti-craving medications prior to discharge, though patient declining at this time.   - Continue prn Zofran as needed for nausea   - Seizure and withdrawal precautions in place     Depression/Anxiety:  - Increase Trintellix to 20 mg daily. Patient has been on dose of 10 mg daily since 2018. Tolerating well without side effects though continues to have sx of depression.   - Continue hydroxyzine 25 mg q4h prn for acute anxiety  - Continue Trazodone 50 mg at bedtime prn for sleep disturbances      Patient will be treated in therapeutic milieu with appropriate individual and group therapies as described.     Nicotine Use Disorder: Patient offered and declined replacement     Medical treatment/interventions:  Medical concerns: Alcohol withdrawal and patient reporting right wrist pain related to fall.   Consults: Routine IM consult placed. Appreciate assistance.  Labs: Reviewed.   EKG: Ordered on 9/22  to monitor QTc. QTc is abnormal at 462. Would recommend repeating with PCP post discharge. Reviewed with patient today.      Legal Status: Voluntary     Safety Assessment:   Checks: Status 15  Pt has not required locked seclusion or restraints in the past 24 hours to maintain safety, please refer to RN documentation for further details.    The risks, benefits, alternatives and side effects have been discussed and are understood by the patient.     Disposition Plan:     Reason for ongoing admission: requires detoxification from substance that poses a risk of bodily harm during withdrawal period  Discharge location: Residential CD Program [facility]. Patient would benefit from increased CD supports.   Discharge Medications: not ordered  Follow-up Appointments: not scheduled  Anticipated length of stay: 2-3 days     Entered by: Leti  EMELIA Montesinos on 9/23/2021 at 8:37 AM

## 2021-09-23 NOTE — PROGRESS NOTES
Case Management Note  9/23/2021    Pt completed CD assessment which was faxed to [facility] via [email or web address] by .     Writer emailed [facility] at [email or web address] with assessment and records, UNIQUE, MAR.     Writer faxed assessment to  for auth via Epic routing.     Addendum: Auth received for [facility] at 3:55 PM.     Venita Pickard, JAKOBC, LADC
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
## 2021-09-23 NOTE — PLAN OF CARE
"S: Mark has been resting in bed for much of the evening. He did not participate in group. He reports drinking plenty of fluids and ate 100% of dinner. He denies SI/SIB/HI.  Pt's blood pressure was 174/102 at 1626; /105 at 1742 recheck. On call provider contacted by CLARE Yusuf. Hydralazine 25 mg ordered and administered at 1810. Recheck at 1928 /89 and 159/100 at 2029.  Pt had xray on right wrist earlier today, results reviewed this evening. Per xray results: \"Impression: Finding concerning for nondisplaced fracture at the radial styloid base.\" On call provider contacted by CLARE Yusuf; splint ordered and placed. On call provider placed ortho consult for tomorrow am.  B: Pt admitted for alcohol withdrawal and detoxification.  A: Pt went from moderate to severe to moderate alcohol withdrawal AEB MSSA scores of  9, 17 & 11.  R: Administered tylenol 650 mg x2, valium 5 mg, valium 10 mg x2, hydralazine 25 mg, ondansetron 4 mg, trazodone 100 mg. Continue to monitor and medicate as ordered and indicated.   "

## 2021-09-23 NOTE — PLAN OF CARE
Pt appeared to be a sleep at all safety checks.  He slept 6.75 hours.  His MSSA scores were 8 and 8.  He received valium 10 mg X2.
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
## 2021-09-23 NOTE — CONSULTS
Brief medicine note:  - Circumstances of recent discharge and re-admittance noted. Please refer to recent medicine Consult note in charting dated 9/3/21. Pt has subacute R non-displaced radial fx, currently stable in splint. Ortho consult pending. Will order prn ibuprofen in addition to prn Tylenol. No further medical eval necessary. Will follow up rec's from orthopedics. Please call the on-call medical provider for any f/u medical concerns if they arise.     Last Alfred PA-C  Internal Medicine Hospitalist   [facility]ist group  [phone]

## 2021-09-23 NOTE — PROGRESS NOTES
RN informed that the read on the Rt wrist X ray was back, indicating an undisplaced fracture at the styloid process of the radius  I met with patient, who did complain of pain around the base of the thumb with some restrictions in the movemnet of the wrist   No distal neurovascular deficit  Advised wrist splint and formal orthopedic evaluation in the morning ( Consult placed)    Yessy Garcia MD  Hospitalist - [facility] ([facility] [place])   House Physician Rotating pager: [phone]

## 2021-09-24 VITALS
RESPIRATION RATE: 16 BRPM | HEART RATE: 73 BPM | HEIGHT: 70 IN | WEIGHT: 237.3 LBS | OXYGEN SATURATION: 94 % | TEMPERATURE: 97.8 F | BODY MASS INDEX: 33.97 KG/M2 | SYSTOLIC BLOOD PRESSURE: 123 MMHG | DIASTOLIC BLOOD PRESSURE: 85 MMHG

## 2021-09-24 PROCEDURE — 250N000013 HC RX MED GY IP 250 OP 250 PS 637: Performed by: PHYSICIAN ASSISTANT

## 2021-09-24 PROCEDURE — 99239 HOSP IP/OBS DSCHRG MGMT >30: CPT | Performed by: PSYCHIATRY & NEUROLOGY

## 2021-09-24 PROCEDURE — 250N000013 HC RX MED GY IP 250 OP 250 PS 637: Performed by: PSYCHIATRY & NEUROLOGY

## 2021-09-24 RX ORDER — FOLIC ACID 1 MG/1
1 TABLET ORAL DAILY
Qty: 30 TABLET | Refills: 0 | Status: ON HOLD | OUTPATIENT
Start: 2021-09-24 | End: 2021-10-08

## 2021-09-24 RX ORDER — LANOLIN ALCOHOL/MO/W.PET/CERES
100 CREAM (GRAM) TOPICAL DAILY
Qty: 30 TABLET | Refills: 0 | Status: ON HOLD | OUTPATIENT
Start: 2021-09-24 | End: 2021-10-08

## 2021-09-24 RX ORDER — PANTOPRAZOLE SODIUM 40 MG/1
40 TABLET, DELAYED RELEASE ORAL DAILY
Qty: 30 TABLET | Refills: 0 | Status: ON HOLD | OUTPATIENT
Start: 2021-09-25 | End: 2021-10-08

## 2021-09-24 RX ORDER — MULTIPLE VITAMINS W/ MINERALS TAB 9MG-400MCG
1 TAB ORAL DAILY
Qty: 30 TABLET | Refills: 0 | Status: ON HOLD | OUTPATIENT
Start: 2021-09-24 | End: 2021-10-08

## 2021-09-24 RX ADMIN — IBUPROFEN 600 MG: 600 TABLET ORAL at 16:15

## 2021-09-24 RX ADMIN — FOLIC ACID 1 MG: 1 TABLET ORAL at 08:46

## 2021-09-24 RX ADMIN — THIAMINE HCL TAB 100 MG 100 MG: 100 TAB at 08:47

## 2021-09-24 RX ADMIN — ACETAMINOPHEN 650 MG: 325 TABLET, FILM COATED ORAL at 12:33

## 2021-09-24 RX ADMIN — PANTOPRAZOLE SODIUM 40 MG: 40 TABLET, DELAYED RELEASE ORAL at 08:48

## 2021-09-24 RX ADMIN — ACETAMINOPHEN 650 MG: 325 TABLET, FILM COATED ORAL at 08:47

## 2021-09-24 RX ADMIN — MULTIPLE VITAMINS W/ MINERALS TAB 1 TABLET: TAB at 08:46

## 2021-09-24 RX ADMIN — IBUPROFEN 600 MG: 600 TABLET ORAL at 08:47

## 2021-09-24 RX ADMIN — VORTIOXETINE 20 MG: 10 TABLET, FILM COATED ORAL at 08:48

## 2021-09-24 ASSESSMENT — ACTIVITIES OF DAILY LIVING (ADL)
HYGIENE/GROOMING: INDEPENDENT
DRESS: INDEPENDENT
ORAL_HYGIENE: INDEPENDENT
LAUNDRY: WITH SUPERVISION
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
## 2021-09-24 NOTE — PLAN OF CARE
Problem: Adult Inpatient Plan of Care  Goal: Optimal Comfort and Wellbeing  Outcome: No Change     Behavioral  Pt appeared sleeping comfortably overnight; breathing was even and unlabored.      Medical  Pt continues in alcohol withdrawal; MSSA  4; no valium given this shift; Pt has received a total of 120 mg since admission to the hospital; pt last valium on 9/23 @ 1623.    No new medical concerns noted.

## 2021-09-24 NOTE — PLAN OF CARE
Pt has not required valium for 24 hours for withdrawal symptoms. Pt is now placed in Out Of Detox status. Per provider orders, Pt will now be discharged.    Pt denies SI.     Pt states using Uber for ride home. Pt will  medications at home pharmacy.

## 2021-09-24 NOTE — DISCHARGE INSTRUCTIONS
Behavioral Discharge Planning and Instructions  THANK YOU FOR CHOOSING [address]  [phone]    Summary: You were admitted to [facility] on 9/21/2021 for detoxification from alcohol.  A medical exam was performed that included lab work. You have met with a  and opted to attend residential at [facility].  Please take care and make your recovery a daily priority, Mark!  It was a pleasure working with you and the entire treatment team here wishes you the very best in your recovery!     Recommendation:  Enter and complete a residential treatment program such as [facility] and follow all continuing care recommendations.      Main Diagnoses:  Per Dr. Leti Montesinos MD;  Alcohol Use Disorder, severe, in withdrawal, complicated by hx of withdrawal seizure  MDD, recurrent, moderate  Anxiety, unspecified. R/O Panic Disorder  Nicotine Use Disorder, mild to moderate  Opiate Use Disorder, in full sustained remission  Gambling Use Disorder, moderate severity    Major Treatments, Procedures and Findings:  You were treated for alcohol detoxification using valium. You completed a chemical dependency assessment. You had labs drawn and those results were reviewed with you. Please take a copy of your lab work with you to your next primary care provider appointment.    Symptoms to Report:  If you experience more anxiety, confusion, sleeplessness, deep sadness or thoughts of suicide, notify your treatment team or notify your primary care provider. IF ANY OF THE SYMPTOMS YOU ARE EXPERIENCING ARE A MEDICAL EMERGENCY CALL 911 IMMEDIATELY.     Lifestyle Adjustment: Adjust your lifestyle to get enough sleep, relaxation, exercise and good nutrition. Continue to develop healthy coping skills to decrease stress and promote a sober living environment. Do not use mood altering substances including alcohol, illegal drugs or addictive medications other than what is currently prescribed.     Disposition:  [facility]  Admit date/time: Please follow up with [facility] in regards to admission at number below.  Email: [email or web address]  P: [phone] F: [phone]  About: [facility] ([facility]) is a behavioral health organization dedicated to helping people with mental health and substance use disorders Move Forward in [facility]. [facility] provides Residential and Intensive Outpatient Co-Occurring Disorders Treatment, Individual and Intensive Outpatient (IOP) mental health counseling services, chemical dependency treatment, and sober housing.     Facts about COVID19 at www.cdc.gov/COVID19 and www.[place].gov/covid19    Keeping hands clean is one of the most important steps we can take to avoid getting sick and spreading germs to others.  Please wash your hands frequently and lather with soap for at least 20 seconds!    Follow-up Appointment:   [facility]  Dr Tani Carmen [phone]  Tuesday September 28th at 11:20 am     Recovery apps for your phone to locate current in person and zoom recovery meetings  [place] - meeting am[place]  AA  - meeting america  Meeting guide - meeting america  Quick NA meeting - meeting america  Dana- has various apps    Resources:  *due to covid-19 most AA/NA meetings are being held online*  AA meetings online search for them at: https://aa-intergroup.org (worldwide meeting listings)  AA meetings for [place] area can be found online at: https://a[place].org (click local online meetings listings)  NA meetings for [place] area can be found online at: https://www.nam[place].org  (click find a meeting)  AA and NA Sponsors are excellent resources for support and you can find one at any support group meeting.   Alcoholics Anonymous (https://aa.org/): for information 24 hours/day  AA Intergroup service office in [place] (http://www.aastpaul.org/) 786.513.1936  AA Intergroup service office in [facility]: [phone].  (http://www.a[place].org/)  Narcotics Anonymous (www.nam[place].org) (969) 719-2346  https://aafairviewriverside.org/meetings  SMART Recovery - self management for addiction recovery:  www.smartrecovery.org  Pathways ~ A Health Crisis Resource & Support Center:  159.550.7788.  https://prescribetoprevent.org/patient-education/videos/  http://www.harmreduction.org  [facility] 576-066-5642  Support Group:  AA/NA and Sponsor/support.  National [place] on Mental Illness (www.[place].kevin.org): 776.866.9919 or 819-179-4749.  Alcoholics Anonymous (www.alcoholics-anonymous.org): Check your phone book for your local chapter.  Suicide Awareness Voices of Education (SAVE) (www.save.org): 718-888-MXCQ (9612)  National Suicide Prevention Line (www.mentalhealthmn.org): 371-721-YIBB (0275)  Mental Health Consumer/Survivor Network of [place] (www.mhcsn.net): 139.434.3946 or 088-832-2537  Mental Health Association of [place] (www.mentalhealth.org): 194.415.4714 or 456-815-5578   Substance Abuse and Mental Health Services (www.samhsa.gov)  [place] Opioid Prevention Coalition: www.opioidcoalition.org    [place] Recovery Connection (MRC)  [facility] connects people seeking recovery to resources that help foster and sustain long-term recovery.  Whether you are seeking resources for treatment, transportation, housing, job training, education, health care or other pathways to recovery, [facility] is a great place to start.  570.872.3794.  www.[place]Planex.org    Great Pod casts for nutrition and wellness  Listen on Apple Podcasts  Dishing Up Nutrition   [employer]   Nutrition       Understand the connection between what you eat and how you feel. Hosted by licensed nutritionists and dietitians from [employer] we share practical, real-life solutions for healthier living through nutrition.     General Medication Instructions:   See your medication sheet(s) for instructions.   Take all  medications as prescribed.  Make no changes unless your primary care provider suggests them.   Go to all your primary care provider visits.  Be sure to have all your required lab tests. This way, your medicines can be refilled on time.  Do not use any forms of alcohol.    Please Note:  If you have any questions at anytime after you are discharged please call [facility] detox unit 3AW at [phone].  [facility], Behavioral Intake [phone]  Medical Records call [phone]  Outpatient Behavioral Intake call [phone]  LP+ Wait List/Bed Availability call [phone]    Please remember to take all of your behavioral discharge planning and lab paperwork to any follow up appointments, it contains your lab results, diagnosis, medication list and discharge recommendations.      THANK YOU FOR CHOOSING [facility]

## 2021-09-24 NOTE — PLAN OF CARE
Pt continues to be monitored for alcohol withdrawal. MSSA scores this shift 4 and 3. Pt continues to have pain in right wrist due to fracture, PRN APAP and ibuprofen administered which was partially effective. Pt plans to discharge to home this afternoon to await placement for residential chemical dependency treatment at [facility].

## 2021-09-24 NOTE — PLAN OF CARE
S: Mark has been visible in the milieu off and on throughout the evening. He reports eating 100% of his dinner and drinking plenty of fluids. He denies HI/SI/SIB. He rated pain in his right wrist 6/10, with some relief from tylenol and ibuprofen.  B: Pt admitted for alcohol withdrawal and detoxification.  A: MSSA scores 8 and 6. Administered valium 10 mg, tylenol 650 mg x2, ibuprofen 600 mg x2, hydralazine 25 mg, and trazodone 100 mg.   R: Continue to monitor and medicate as ordered and indicated.

## 2021-10-04 ENCOUNTER — APPOINTMENT (OUTPATIENT)
Dept: GENERAL RADIOLOGY | Facility: CLINIC | Age: 56
DRG: 897 | End: 2021-10-04
Attending: EMERGENCY MEDICINE
Payer: COMMERCIAL

## 2021-10-04 ENCOUNTER — HOSPITAL ENCOUNTER (INPATIENT)
Facility: CLINIC | Age: 56
LOS: 3 days | Discharge: HOME | DRG: 897 | End: 2021-10-08
Attending: EMERGENCY MEDICINE | Admitting: PSYCHIATRY & NEUROLOGY
Payer: COMMERCIAL

## 2021-10-04 ENCOUNTER — TELEPHONE (OUTPATIENT)
Dept: BEHAVIORAL HEALTH | Facility: CLINIC | Age: 56
End: 2021-10-04

## 2021-10-04 DIAGNOSIS — Z11.52 ENCOUNTER FOR SCREENING LABORATORY TESTING FOR SEVERE ACUTE RESPIRATORY SYNDROME CORONAVIRUS 2 (SARS-COV-2): ICD-10-CM

## 2021-10-04 DIAGNOSIS — F10.220 ALCOHOL DEPENDENCE WITH UNCOMPLICATED INTOXICATION (H): ICD-10-CM

## 2021-10-04 DIAGNOSIS — F19.20 CHEMICAL DEPENDENCY (H): ICD-10-CM

## 2021-10-04 DIAGNOSIS — J45.20 MILD INTERMITTENT ASTHMA, UNCOMPLICATED: ICD-10-CM

## 2021-10-04 DIAGNOSIS — K08.89 TOOTH PAIN: Primary | ICD-10-CM

## 2021-10-04 DIAGNOSIS — F10.920 ALCOHOL INTOXICATION, UNCOMPLICATED (H): ICD-10-CM

## 2021-10-04 LAB
ALBUMIN SERPL-MCNC: 4.1 G/DL (ref 3.4–5)
ALCOHOL BREATH TEST: 0.28 (ref 0–0.01)
ALP SERPL-CCNC: 119 U/L (ref 40–150)
ALT SERPL W P-5'-P-CCNC: 80 U/L (ref 0–70)
AMPHETAMINES UR QL SCN: ABNORMAL
ANION GAP SERPL CALCULATED.3IONS-SCNC: 6 MMOL/L (ref 3–14)
AST SERPL W P-5'-P-CCNC: 74 U/L (ref 0–45)
BARBITURATES UR QL: ABNORMAL
BASOPHILS # BLD AUTO: 0.1 10E3/UL (ref 0–0.2)
BASOPHILS NFR BLD AUTO: 1 %
BENZODIAZ UR QL: ABNORMAL
BILIRUB SERPL-MCNC: 0.3 MG/DL (ref 0.2–1.3)
BUN SERPL-MCNC: 10 MG/DL (ref 7–30)
CALCIUM SERPL-MCNC: 7.9 MG/DL (ref 8.5–10.1)
CANNABINOIDS UR QL SCN: ABNORMAL
CHLORIDE BLD-SCNC: 106 MMOL/L (ref 94–109)
CO2 SERPL-SCNC: 28 MMOL/L (ref 20–32)
COCAINE UR QL: ABNORMAL
CREAT SERPL-MCNC: 0.65 MG/DL (ref 0.66–1.25)
D DIMER PPP FEU-MCNC: 0.35 UG/ML FEU (ref 0–0.5)
EOSINOPHIL # BLD AUTO: 0.2 10E3/UL (ref 0–0.7)
EOSINOPHIL NFR BLD AUTO: 4 %
ERYTHROCYTE [DISTWIDTH] IN BLOOD BY AUTOMATED COUNT: 16.8 % (ref 10–15)
GFR SERPL CREATININE-BSD FRML MDRD: >90 ML/MIN/1.73M2
GLUCOSE BLD-MCNC: 87 MG/DL (ref 70–99)
HCT VFR BLD AUTO: 45 % (ref 40–53)
HGB BLD-MCNC: 15 G/DL (ref 13.3–17.7)
IMM GRANULOCYTES # BLD: 0 10E3/UL
IMM GRANULOCYTES NFR BLD: 0 %
LYMPHOCYTES # BLD AUTO: 2.6 10E3/UL (ref 0.8–5.3)
LYMPHOCYTES NFR BLD AUTO: 58 %
MCH RBC QN AUTO: 31.1 PG (ref 26.5–33)
MCHC RBC AUTO-ENTMCNC: 33.3 G/DL (ref 31.5–36.5)
MCV RBC AUTO: 93 FL (ref 78–100)
MONOCYTES # BLD AUTO: 0.3 10E3/UL (ref 0–1.3)
MONOCYTES NFR BLD AUTO: 7 %
NEUTROPHILS # BLD AUTO: 1.4 10E3/UL (ref 1.6–8.3)
NEUTROPHILS NFR BLD AUTO: 30 %
NRBC # BLD AUTO: 0 10E3/UL
NRBC BLD AUTO-RTO: 0 /100
OPIATES UR QL SCN: ABNORMAL
PLATELET # BLD AUTO: 174 10E3/UL (ref 150–450)
POTASSIUM BLD-SCNC: 3.7 MMOL/L (ref 3.4–5.3)
PROT SERPL-MCNC: 8 G/DL (ref 6.8–8.8)
RBC # BLD AUTO: 4.82 10E6/UL (ref 4.4–5.9)
SARS-COV-2 RNA RESP QL NAA+PROBE: NEGATIVE
SODIUM SERPL-SCNC: 140 MMOL/L (ref 133–144)
TROPONIN I SERPL-MCNC: <0.015 UG/L (ref 0–0.04)
WBC # BLD AUTO: 4.5 10E3/UL (ref 4–11)

## 2021-10-04 PROCEDURE — 85379 FIBRIN DEGRADATION QUANT: CPT | Performed by: EMERGENCY MEDICINE

## 2021-10-04 PROCEDURE — 71046 X-RAY EXAM CHEST 2 VIEWS: CPT

## 2021-10-04 PROCEDURE — 93010 ELECTROCARDIOGRAM REPORT: CPT | Performed by: EMERGENCY MEDICINE

## 2021-10-04 PROCEDURE — 36415 COLL VENOUS BLD VENIPUNCTURE: CPT | Performed by: EMERGENCY MEDICINE

## 2021-10-04 PROCEDURE — U0003 INFECTIOUS AGENT DETECTION BY NUCLEIC ACID (DNA OR RNA); SEVERE ACUTE RESPIRATORY SYNDROME CORONAVIRUS 2 (SARS-COV-2) (CORONAVIRUS DISEASE [COVID-19]), AMPLIFIED PROBE TECHNIQUE, MAKING USE OF HIGH THROUGHPUT TECHNOLOGIES AS DESCRIBED BY CMS-2020-01-R: HCPCS | Performed by: EMERGENCY MEDICINE

## 2021-10-04 PROCEDURE — C9803 HOPD COVID-19 SPEC COLLECT: HCPCS | Performed by: EMERGENCY MEDICINE

## 2021-10-04 PROCEDURE — 80307 DRUG TEST PRSMV CHEM ANLYZR: CPT | Performed by: EMERGENCY MEDICINE

## 2021-10-04 PROCEDURE — 99285 EMERGENCY DEPT VISIT HI MDM: CPT | Performed by: EMERGENCY MEDICINE

## 2021-10-04 PROCEDURE — 84484 ASSAY OF TROPONIN QUANT: CPT | Performed by: EMERGENCY MEDICINE

## 2021-10-04 PROCEDURE — 85025 COMPLETE CBC W/AUTO DIFF WBC: CPT | Performed by: EMERGENCY MEDICINE

## 2021-10-04 PROCEDURE — 82040 ASSAY OF SERUM ALBUMIN: CPT | Performed by: EMERGENCY MEDICINE

## 2021-10-04 PROCEDURE — 250N000013 HC RX MED GY IP 250 OP 250 PS 637: Performed by: EMERGENCY MEDICINE

## 2021-10-04 PROCEDURE — 99285 EMERGENCY DEPT VISIT HI MDM: CPT | Mod: 25 | Performed by: EMERGENCY MEDICINE

## 2021-10-04 PROCEDURE — 93005 ELECTROCARDIOGRAM TRACING: CPT | Performed by: EMERGENCY MEDICINE

## 2021-10-04 RX ORDER — FOLIC ACID 1 MG/1
1 TABLET ORAL ONCE
Status: COMPLETED | OUTPATIENT
Start: 2021-10-04 | End: 2021-10-04

## 2021-10-04 RX ORDER — MULTIPLE VITAMINS W/ MINERALS TAB 9MG-400MCG
1 TAB ORAL ONCE
Status: COMPLETED | OUTPATIENT
Start: 2021-10-04 | End: 2021-10-04

## 2021-10-04 RX ORDER — IBUPROFEN 600 MG/1
600 TABLET, FILM COATED ORAL ONCE
Status: COMPLETED | OUTPATIENT
Start: 2021-10-04 | End: 2021-10-04

## 2021-10-04 RX ORDER — LANOLIN ALCOHOL/MO/W.PET/CERES
100 CREAM (GRAM) TOPICAL ONCE
Status: COMPLETED | OUTPATIENT
Start: 2021-10-04 | End: 2021-10-04

## 2021-10-04 RX ORDER — ALBUTEROL SULFATE 90 UG/1
2 AEROSOL, METERED RESPIRATORY (INHALATION) EVERY 4 HOURS PRN
Status: DISCONTINUED | OUTPATIENT
Start: 2021-10-04 | End: 2021-10-08 | Stop reason: HOSPADM

## 2021-10-04 RX ORDER — LORAZEPAM 1 MG/1
1-4 TABLET ORAL EVERY 30 MIN PRN
Status: DISCONTINUED | OUTPATIENT
Start: 2021-10-04 | End: 2021-10-05

## 2021-10-04 RX ORDER — PANTOPRAZOLE SODIUM 40 MG/1
40 TABLET, DELAYED RELEASE ORAL DAILY
Status: DISCONTINUED | OUTPATIENT
Start: 2021-10-05 | End: 2021-10-08 | Stop reason: HOSPADM

## 2021-10-04 RX ADMIN — FOLIC ACID 1 MG: 1 TABLET ORAL at 19:04

## 2021-10-04 RX ADMIN — IBUPROFEN 600 MG: 600 TABLET ORAL at 19:04

## 2021-10-04 RX ADMIN — THIAMINE HCL TAB 100 MG 100 MG: 100 TAB at 19:04

## 2021-10-04 RX ADMIN — MULTIPLE VITAMINS W/ MINERALS TAB 1 TABLET: TAB at 19:04

## 2021-10-04 RX ADMIN — LORAZEPAM 2 MG: 1 TABLET ORAL at 20:30

## 2021-10-04 ASSESSMENT — LIFESTYLE VARIABLES: INTOXICATION: 1

## 2021-10-04 ASSESSMENT — ENCOUNTER SYMPTOMS
WHEEZING: 1
COUGH: 1
VOMITING: 1
DIARRHEA: 1
FEVER: 0
SHORTNESS OF BREATH: 1
ABDOMINAL PAIN: 0

## 2021-10-04 NOTE — TELEPHONE ENCOUNTER
S ER called with clinical on 56/M for Detox in [place] [facility]    B Alcohol detox. last drink about 4-5 hours ago a quart of vodka.. EDDIE .282  Has been drinking a qt of vodka a day for last week. Was in detox then sober for a few days and then started drinking again.  Hx of seizures. No dts. denies other substance concerns. Asthma. No mental health concerns. Fall a few weeks ago, has a splint for it. Ambulatory, eating drinking.     A Vol    R In [place] [facility] awaiting Detox placement    Patient cleared and ready for behavioral bed placement: Yes     8:32pm: Paged provider  8:43pm: Provider accepts  9:00pm: Intake informed unit charge nurse of patient in queue, nurse said patient can come on overnights and will call er for report.

## 2021-10-04 NOTE — ED PROVIDER NOTES
[facility] EMERGENCY DEPARTMENT ([facility])    10/04/21     [facility] 6 4:04 PM   History     Chief Complaint   Patient presents with     Alcohol Intoxication     Detox from alcohol, last drink 2 hours ago, drinks vodka daily.     The history is provided by medical records and the patient.     Mark Berumen is a 56 year old male who presents seeking alcohol detox. Patient had recent attempt at detox. Afterwards he relapsed, has been drinking anywhere from a pint to a quart of vodka daily for the past week. Last drank 4-5 hours ago. Notes prior history of alcohol withdrawal seizure in 2015. No history of DTs. No other substance abuse.  He also has a history of asthma, feels this is flaring.  He is on albuterol, which has been helpful at controlling some of his symptoms.  He reports feeling increased wheezing similar to prior asthma attacks and chest discomfort over past few days. No fevers. No abdominal pain. Endorses diarrhea for the past week and vomiting for the past 2 days.  He also had recent fall with injury to right hand, found to have nondisplaced fracture, was placed in a splint.  No other injuries from falls.  No suicidal or homicidal ideation.     Patient has had both doses of the Moderna COVID-19 vaccination, last dose given on 5/6/21.     I have reviewed the Medications, Allergies, Past Medical and Surgical History, and Social History in the [employer] system.  Past Medical History:   Diagnosis Date     ADD (attention deficit disorder)      Arthritis      Chronic pain      Psoriasis      Substance abuse (H)     Etoh     Uncomplicated asthma        Past Surgical History:   Procedure Laterality Date     ABDOMEN SURGERY       BACK SURGERY       BIOPSY      skin cancer melanoma     HEAD & NECK SURGERY       HIP SURGERY       neck fusions       ORTHOPEDIC SURGERY      right total knee replacement     SOFT TISSUE SURGERY         Family History   Problem Relation Age of Onset     Depression Mother       Substance Abuse Father      Substance Abuse Paternal Grandfather      Substance Abuse Sister      Anxiety Disorder Son      Substance Abuse Brother      Depression Brother      Anxiety Disorder Brother      Substance Abuse Son      Anxiety Disorder Son      Depression Son        Social History     Tobacco Use     Smoking status: Current Every Day Smoker     Packs/day: 0.50     Years: 20.00     Pack years: 10.00     Types: Cigarettes     Smokeless tobacco: Never Used   Substance Use Topics     Alcohol use: Yes     Comment: 1 pint/day [employer]     No current facility-administered medications for this encounter.     Current Outpatient Medications   Medication     pantoprazole (PROTONIX) 40 MG EC tablet     traZODone (DESYREL) 100 MG tablet     vortioxetine (TRINTELLIX) 20 MG tablet     albuterol (PROAIR HFA/PROVENTIL HFA/VENTOLIN HFA) 108 (90 Base) MCG/ACT inhaler     folic acid (FOLVITE) 1 MG tablet     multivitamin w/minerals (THERA-VIT-M) tablet     nicotine (NICORETTE) 2 MG gum     thiamine (B-1) 100 MG tablet      No Known Allergies     Review of Systems   Constitutional: Negative for fever.   Respiratory: Positive for cough, shortness of breath and wheezing.    Cardiovascular: Positive for chest pain (Chest discomfort).   Gastrointestinal: Positive for diarrhea and vomiting. Negative for abdominal pain.   All other systems reviewed and are negative.      Physical Exam   BP: 137/83  Pulse: 96  Temp: 97.8  F (36.6  C)  Resp: 16  Weight: 104.3 kg (230 lb)  SpO2: 100 %      Physical Exam  General: patient is alert and oriented and in no acute distress   Head: atraumatic and normocephalic   EENT: moist mucus membranes, pupils round and reactive   Neck: supple   Cardiovascular: regular rate and rhythm, extremities warm and well perfused, no lower extremity edema  Pulmonary: bilateral end expiratory wheezing, no rhonchi  Abdomen: soft, non-tender   Musculoskeletal: normal range of motion   Neurological: alert and  oriented, moving all extremities symmetrically, gait normal   Skin: warm, dry     ED Course        Procedures            EKG Interpretation:      Interpreted by Iraida Paniagua MD  Time reviewed: 1625  Symptoms at time of EKG: dsypnea   Rhythm: normal sinus   Rate: normal  Axis: normal  Ectopy: none  Conduction: normal  ST Segments/ T Waves: No ST-T wave changes  Q Waves: none  Comparison to prior: Unchanged    Clinical Impression: normal EKG                        Labs Ordered and Resulted from Time of ED Arrival Up to the Time of Departure from the ED   ALCOHOL BREATH TEST POCT - Abnormal; Notable for the following components:       Result Value    Alcohol Breath Test 0.282 (*)     All other components within normal limits   DRUG ABUSE SCREEN 1 URINE (ED)   URINE DRUGS OF ABUSE SCREEN    Narrative:     The following orders were created for panel order Urine Drugs of Abuse Screen.                  Procedure                               Abnormality         Status                                     ---------                               -----------         ------                                     Drug abuse screen 1 urin...[[id number]]                                                                                   Please view results for these tests on the individual orders.            Assessments & Plan (with Medical Decision Making)    Mr. Berumen is a 56 year old male with a history of asthma and substance abuse who presents seeking detox.  He is currently intoxicated with a breath alcohol 0.28.  He denies other drug use.  He currently does not appear to have any withdrawal symptoms.  He does have a history of withdrawal seizures in the past.  He is wearing a removable wrist splint due to a previous nondisplaced radial styloid fracture but denies any other more recent trauma.  He has been having intermittent wheezing that is resolved with home albuterol.  Patient did have a chest x-ray which shows no  focal infiltrate, cardiomegaly or evidence of pneumothorax.  Covid swab is pending.  Low suspicion for PE given his symptoms but is not PERC negative.  D-dimer is within normal limits and no further imaging indicated at this time.  Also low suspicion for cardiac etiology.  He did have an EKG which shows no acute ischemic changes and troponin is negative.  He denies any other acute medical concerns.  Baseline labs are obtained which show no significant electrolyte abnormalities.  His ALT is mildly elevated at 80 and AST 74.  Denies any acute mental health concerns.  He is voluntary and requesting detox.  She was given vitamin supplementation.  Will plan to admit for further management.    I have reviewed the nursing notes.    I have reviewed the findings, diagnosis, plan and need for follow up with the patient.    New Prescriptions    No medications on file       Final diagnoses:   Alcohol intoxication, uncomplicated (H)     I, Yoli Stearns, am serving as a trained medical scribe to document services personally performed by Iraida Rincon MD based on the provider's statements to me on October 4, 2021.  This document has been checked and approved by the attending provider.    I, Iraida Rincon MD, was physically present and have reviewed and verified the accuracy of this note documented by Yoli Stearns, medical scribe.      Iraida Rincon MD       10/4/2021   [facility] EMERGENCY DEPARTMENT      Iraida Rincon MD  10/04/21 183

## 2021-10-05 ENCOUNTER — APPOINTMENT (OUTPATIENT)
Dept: GENERAL RADIOLOGY | Facility: CLINIC | Age: 56
DRG: 897 | End: 2021-10-05
Attending: PHYSICIAN ASSISTANT
Payer: COMMERCIAL

## 2021-10-05 PROBLEM — F10.920 ALCOHOL INTOXICATION, UNCOMPLICATED (H): Status: ACTIVE | Noted: 2021-10-05

## 2021-10-05 LAB
ALBUMIN UR-MCNC: 70 MG/DL
APPEARANCE UR: CLEAR
ATRIAL RATE - MUSE: 90 BPM
BACTERIA #/AREA URNS HPF: ABNORMAL /HPF
BILIRUB UR QL STRIP: NEGATIVE
COLOR UR AUTO: YELLOW
DIASTOLIC BLOOD PRESSURE - MUSE: NORMAL MMHG
GLUCOSE UR STRIP-MCNC: NEGATIVE MG/DL
HGB UR QL STRIP: ABNORMAL
INTERPRETATION ECG - MUSE: NORMAL
KETONES UR STRIP-MCNC: 100 MG/DL
LEUKOCYTE ESTERASE UR QL STRIP: NEGATIVE
MUCOUS THREADS #/AREA URNS LPF: PRESENT /LPF
NITRATE UR QL: NEGATIVE
P AXIS - MUSE: 51 DEGREES
PH UR STRIP: 6 [PH] (ref 5–7)
PR INTERVAL - MUSE: 166 MS
QRS DURATION - MUSE: 90 MS
QT - MUSE: 374 MS
QTC - MUSE: 457 MS
R AXIS - MUSE: 34 DEGREES
RBC URINE: 113 /HPF
SP GR UR STRIP: 1.03 (ref 1–1.03)
SQUAMOUS EPITHELIAL: <1 /HPF
SYSTOLIC BLOOD PRESSURE - MUSE: NORMAL MMHG
T AXIS - MUSE: 35 DEGREES
UROBILINOGEN UR STRIP-MCNC: NORMAL MG/DL
VENTRICULAR RATE- MUSE: 90 BPM
WBC URINE: 1 /HPF

## 2021-10-05 PROCEDURE — 73110 X-RAY EXAM OF WRIST: CPT | Mod: RT

## 2021-10-05 PROCEDURE — 99222 1ST HOSP IP/OBS MODERATE 55: CPT | Performed by: PHYSICIAN ASSISTANT

## 2021-10-05 PROCEDURE — 250N000013 HC RX MED GY IP 250 OP 250 PS 637: Performed by: EMERGENCY MEDICINE

## 2021-10-05 PROCEDURE — 73110 X-RAY EXAM OF WRIST: CPT | Mod: 26 | Performed by: RADIOLOGY

## 2021-10-05 PROCEDURE — HZ2ZZZZ DETOXIFICATION SERVICES FOR SUBSTANCE ABUSE TREATMENT: ICD-10-PCS | Performed by: PSYCHIATRY & NEUROLOGY

## 2021-10-05 PROCEDURE — 99207 PR CDG-HISTORY COMP: MEETS EXP. PROBLEM FOCUSED - DOWN CODED LACK OF HPI: CPT | Performed by: PSYCHIATRY & NEUROLOGY

## 2021-10-05 PROCEDURE — 250N000013 HC RX MED GY IP 250 OP 250 PS 637: Performed by: PSYCHIATRY & NEUROLOGY

## 2021-10-05 PROCEDURE — 81001 URINALYSIS AUTO W/SCOPE: CPT | Performed by: PSYCHIATRY & NEUROLOGY

## 2021-10-05 PROCEDURE — 128N000004 HC R&B CD ADULT

## 2021-10-05 PROCEDURE — 250N000013 HC RX MED GY IP 250 OP 250 PS 637: Performed by: PHYSICIAN ASSISTANT

## 2021-10-05 PROCEDURE — 99207 PR CONSULT E&M CHANGED TO INITIAL LEVEL: CPT | Performed by: PHYSICIAN ASSISTANT

## 2021-10-05 PROCEDURE — 99232 SBSQ HOSP IP/OBS MODERATE 35: CPT | Performed by: PSYCHIATRY & NEUROLOGY

## 2021-10-05 PROCEDURE — 250N000011 HC RX IP 250 OP 636: Performed by: PSYCHIATRY & NEUROLOGY

## 2021-10-05 RX ORDER — MAGNESIUM HYDROXIDE/ALUMINUM HYDROXICE/SIMETHICONE 120; 1200; 1200 MG/30ML; MG/30ML; MG/30ML
30 SUSPENSION ORAL EVERY 4 HOURS PRN
Status: DISCONTINUED | OUTPATIENT
Start: 2021-10-05 | End: 2021-10-08 | Stop reason: HOSPADM

## 2021-10-05 RX ORDER — ALBUTEROL SULFATE 90 UG/1
2 AEROSOL, METERED RESPIRATORY (INHALATION) EVERY 6 HOURS PRN
Qty: 6.7 G | Refills: 1 | Status: SHIPPED | OUTPATIENT
Start: 2021-10-05

## 2021-10-05 RX ORDER — ACETAMINOPHEN 325 MG/1
650 TABLET ORAL EVERY 4 HOURS PRN
Status: DISCONTINUED | OUTPATIENT
Start: 2021-10-05 | End: 2021-10-08 | Stop reason: HOSPADM

## 2021-10-05 RX ORDER — IBUPROFEN 600 MG/1
600 TABLET, FILM COATED ORAL EVERY 6 HOURS PRN
Status: DISCONTINUED | OUTPATIENT
Start: 2021-10-05 | End: 2021-10-08 | Stop reason: HOSPADM

## 2021-10-05 RX ORDER — LANOLIN ALCOHOL/MO/W.PET/CERES
3 CREAM (GRAM) TOPICAL
Status: DISCONTINUED | OUTPATIENT
Start: 2021-10-05 | End: 2021-10-08 | Stop reason: HOSPADM

## 2021-10-05 RX ORDER — HYDROXYZINE HYDROCHLORIDE 25 MG/1
25 TABLET, FILM COATED ORAL EVERY 4 HOURS PRN
Status: DISCONTINUED | OUTPATIENT
Start: 2021-10-05 | End: 2021-10-08 | Stop reason: HOSPADM

## 2021-10-05 RX ORDER — LOPERAMIDE HCL 2 MG
2 CAPSULE ORAL 4 TIMES DAILY PRN
Status: DISCONTINUED | OUTPATIENT
Start: 2021-10-05 | End: 2021-10-08 | Stop reason: HOSPADM

## 2021-10-05 RX ORDER — MULTIPLE VITAMINS W/ MINERALS TAB 9MG-400MCG
1 TAB ORAL DAILY
Status: DISCONTINUED | OUTPATIENT
Start: 2021-10-05 | End: 2021-10-08 | Stop reason: HOSPADM

## 2021-10-05 RX ORDER — ONDANSETRON 4 MG/1
4 TABLET, ORALLY DISINTEGRATING ORAL EVERY 6 HOURS PRN
Status: DISCONTINUED | OUTPATIENT
Start: 2021-10-05 | End: 2021-10-08 | Stop reason: HOSPADM

## 2021-10-05 RX ORDER — CLONIDINE HYDROCHLORIDE 0.1 MG/1
0.1 TABLET ORAL 2 TIMES DAILY PRN
Status: DISCONTINUED | OUTPATIENT
Start: 2021-10-05 | End: 2021-10-08 | Stop reason: HOSPADM

## 2021-10-05 RX ORDER — DIAZEPAM 5 MG
5-20 TABLET ORAL EVERY 30 MIN PRN
Status: DISCONTINUED | OUTPATIENT
Start: 2021-10-05 | End: 2021-10-08

## 2021-10-05 RX ORDER — LORAZEPAM 1 MG/1
1-4 TABLET ORAL EVERY 30 MIN PRN
Status: DISCONTINUED | OUTPATIENT
Start: 2021-10-05 | End: 2021-10-05

## 2021-10-05 RX ORDER — POLYETHYLENE GLYCOL 3350 17 G/17G
17 POWDER, FOR SOLUTION ORAL DAILY PRN
Status: DISCONTINUED | OUTPATIENT
Start: 2021-10-05 | End: 2021-10-08 | Stop reason: HOSPADM

## 2021-10-05 RX ORDER — PANTOPRAZOLE SODIUM 40 MG/1
40 TABLET, DELAYED RELEASE ORAL DAILY
Status: DISCONTINUED | OUTPATIENT
Start: 2021-10-05 | End: 2021-10-05

## 2021-10-05 RX ORDER — FOLIC ACID 1 MG/1
1 TABLET ORAL DAILY
Status: DISCONTINUED | OUTPATIENT
Start: 2021-10-05 | End: 2021-10-08 | Stop reason: HOSPADM

## 2021-10-05 RX ORDER — LANOLIN ALCOHOL/MO/W.PET/CERES
100 CREAM (GRAM) TOPICAL DAILY
Status: DISCONTINUED | OUTPATIENT
Start: 2021-10-05 | End: 2021-10-08 | Stop reason: HOSPADM

## 2021-10-05 RX ORDER — ALBUTEROL SULFATE 90 UG/1
2 AEROSOL, METERED RESPIRATORY (INHALATION) EVERY 6 HOURS PRN
Status: DISCONTINUED | OUTPATIENT
Start: 2021-10-05 | End: 2021-10-05

## 2021-10-05 RX ADMIN — ACETAMINOPHEN 650 MG: 325 TABLET, FILM COATED ORAL at 04:41

## 2021-10-05 RX ADMIN — DIAZEPAM 10 MG: 5 TABLET ORAL at 12:15

## 2021-10-05 RX ADMIN — DIAZEPAM 10 MG: 5 TABLET ORAL at 20:11

## 2021-10-05 RX ADMIN — ACETAMINOPHEN 650 MG: 325 TABLET, FILM COATED ORAL at 08:14

## 2021-10-05 RX ADMIN — CLONIDINE HYDROCHLORIDE 0.1 MG: 0.1 TABLET ORAL at 12:15

## 2021-10-05 RX ADMIN — LORAZEPAM 2 MG: 1 TABLET ORAL at 08:10

## 2021-10-05 RX ADMIN — IBUPROFEN 600 MG: 600 TABLET ORAL at 18:37

## 2021-10-05 RX ADMIN — DIAZEPAM 10 MG: 5 TABLET ORAL at 16:23

## 2021-10-05 RX ADMIN — VORTIOXETINE 20 MG: 10 TABLET, FILM COATED ORAL at 08:10

## 2021-10-05 RX ADMIN — ONDANSETRON 4 MG: 4 TABLET, ORALLY DISINTEGRATING ORAL at 04:41

## 2021-10-05 RX ADMIN — LORAZEPAM 2 MG: 1 TABLET ORAL at 01:59

## 2021-10-05 RX ADMIN — THIAMINE HCL TAB 100 MG 100 MG: 100 TAB at 08:11

## 2021-10-05 RX ADMIN — PANTOPRAZOLE SODIUM 40 MG: 40 TABLET, DELAYED RELEASE ORAL at 08:10

## 2021-10-05 RX ADMIN — LORAZEPAM 1 MG: 1 TABLET ORAL at 00:34

## 2021-10-05 RX ADMIN — FOLIC ACID 1 MG: 1 TABLET ORAL at 08:11

## 2021-10-05 RX ADMIN — ONDANSETRON 4 MG: 4 TABLET, ORALLY DISINTEGRATING ORAL at 10:10

## 2021-10-05 RX ADMIN — ACETAMINOPHEN 650 MG: 325 TABLET, FILM COATED ORAL at 16:23

## 2021-10-05 RX ADMIN — MULTIPLE VITAMINS W/ MINERALS TAB 1 TABLET: TAB at 08:10

## 2021-10-05 RX ADMIN — LORAZEPAM 2 MG: 1 TABLET ORAL at 04:41

## 2021-10-05 ASSESSMENT — ACTIVITIES OF DAILY LIVING (ADL)
DRESS: INDEPENDENT
FALL_HISTORY_WITHIN_LAST_SIX_MONTHS: YES
DRESSING/BATHING_DIFFICULTY: NO
WEAR_GLASSES_OR_BLIND: NO
TOILETING_ISSUES: NO
ORAL_HYGIENE: INDEPENDENT
DIFFICULTY_COMMUNICATING: NO
DOING_ERRANDS_INDEPENDENTLY_DIFFICULTY: NO
CONCENTRATING,_REMEMBERING_OR_MAKING_DECISIONS_DIFFICULTY: NO
LAUNDRY: WITH SUPERVISION
WALKING_OR_CLIMBING_STAIRS_DIFFICULTY: NO
NUMBER_OF_TIMES_PATIENT_HAS_FALLEN_WITHIN_LAST_SIX_MONTHS: 1
HYGIENE/GROOMING: INDEPENDENT
HEARING_DIFFICULTY_OR_DEAF: NO
DIFFICULTY_EATING/SWALLOWING: NO

## 2021-10-05 NOTE — PHARMACY-ADMISSION MEDICATION HISTORY
Admission Medication History Completed by Pharmacy    See [facility] Admission Navigator for allergy information, preferred outpatient pharmacy, prior to admission medications and immunization status.     Medication history sources:  Discharge Summary 9/24/21    Pertinent changes made to PTA medication list:  Added: N/A  Deleted: N/A  Changed: N/A    Additional medication history information:   - Patient not interviewed as part of this medication history. Please discuss any OTC/non-prescription medication use and last doses with the patient that may not be reflected in the list below.    Prior to Admission medications    Medication Sig Last Dose Taking? Auth Provider   albuterol (PROAIR HFA/PROVENTIL HFA/VENTOLIN HFA) 108 (90 Base) MCG/ACT inhaler Inhale 2 puffs into the lungs every 6 hours as needed for shortness of breath / dyspnea or wheezing  Yes Leatha Hugo DO   folic acid (FOLVITE) 1 MG tablet Take 1 tablet (1 mg) by mouth daily Unknown at Unknown time Yes Vipin Saucedo MD   multivitamin w/minerals (THERA-VIT-M) tablet Take 1 tablet by mouth daily Unknown at Unknown time Yes Vipin Saucedo MD   nicotine (NICORETTE) 2 MG gum Place 1 each (2 mg) inside cheek every hour as needed for other (nicotine withdrawal symptoms) Unknown at Unknown time Yes Vipin Saucedo MD   pantoprazole (PROTONIX) 40 MG EC tablet Take 1 tablet (40 mg) by mouth daily 10/4/2021 at Unknown time Yes Vipin Saucedo MD   thiamine (B-1) 100 MG tablet Take 1 tablet (100 mg) by mouth daily Unknown at Unknown time Yes Vipin Saucedo MD   traZODone (DESYREL) 100 MG tablet TAKE 1 TABLET BY MOUTH EVERY NIGHT AT BEDTIME AS NEEDED Unknown at Unknown time Yes Reported, Patient   vortioxetine (TRINTELLIX) 20 MG tablet Take 1 tablet (20 mg) by mouth daily 10/4/2021 at Unknown time Yes Vipin Saucedo MD     Date completed: 10/05/21    Medication history completed by:   Jimmie Rodriguez, PharmD, BCPS  [facility]  [facility] Building: Ascom *[id number]

## 2021-10-05 NOTE — PLAN OF CARE
Behavioral Team Discussion: (10/5/2021)    Continued Stay Criteria/Rationale: Patient admitted for alcohol withdrawal, complicated.  Plan: The following services will be provided to the patient; psychiatric assessment, medication management, therapeutic milieu, individual and group support, and skills groups.   Participants: 3A Provider: Dr. Rizwan Hernandez MD; 3A RN's: Sreedhar Leach RN; 3A CM's: Venita Pickard [facility], Gris Solares MA [facility] and Felicia Snider [facility]   Summary/Recommendation: Providers will assess today for treatment recommendations, discharge planning, and aftercare plans. CM will meet with pt for discharge planning.   Medical/Physical: Internal medicine consult to be completed 10/5/2021.  Precautions:   Behavioral Orders   Procedures     Code 1 - Restrict to Unit     Fall precautions     Routine Programming     As clinically indicated     Seizure precautions     Status 15     Every 15 minutes.     Withdrawal precautions     Rationale for change in precautions or plan: N/A  Progress: No Change.

## 2021-10-05 NOTE — PROGRESS NOTES
Met with Pt for discharge planning.  Pt is requesting referral to  or [facility].  Pt completed an assessment during last admission on 9/23/21.  Pt has [facility] and will need funding auth.  Pt to sign UNIQUE for [facility] referral.

## 2021-10-05 NOTE — H&P
"[facility], [place]   Psychiatric History and Physical  Admission date: 10/4/2021        Chief Complaint:   \"Alcohol.\"         HPI:     The patient is a 57yo male with a history of alcohol use disorder who was admitted to detoxify from alcohol. Says that he feels \"shaky\" and his blood pressure has increased. Some nausea. Didn't sleep well last night. Asks if he can be on Valium instead of Ativan. Is open to CD treatment. Is open to anti-craving medications. Mood is anxious. Denies any SI.      Per ER:  Mark Berumen is a 56 year old male who presents seeking alcohol detox. Patient had recent attempt at detox. Afterwards he relapsed, has been drinking anywhere from a pint to a quart of vodka daily for the past week. Last drank 4-5 hours ago. Notes prior history of alcohol withdrawal seizure in 2015. No history of DTs. No other substance abuse.  He also has a history of asthma, feels this is flaring.  He is on albuterol, which has been helpful at controlling some of his symptoms.  He reports feeling increased wheezing similar to prior asthma attacks and chest discomfort over past few days. No fevers. No abdominal pain. Endorses diarrhea for the past week and vomiting for the past 2 days.  He also had recent fall with injury to right hand, found to have nondisplaced fracture, was placed in a splint.  No other injuries from falls.  No suicidal or homicidal ideation.         Past Psychiatric History:     History of depression. On Trintellix.         Substance Use and History:     Alcohol use disorder. Withdrawal seizures in 2015. Denies DTs.         Past Medical History:   PAST MEDICAL HISTORY:   Past Medical History:   Diagnosis Date     ADD (attention deficit disorder)      Arthritis      Chronic pain      Psoriasis      Substance abuse (H)     Etoh     Uncomplicated asthma        PAST SURGICAL HISTORY:   Past Surgical History:   Procedure Laterality Date     ABDOMEN SURGERY       BACK " SURGERY       BIOPSY      skin cancer melanoma     HEAD & NECK SURGERY       HIP SURGERY       neck fusions       ORTHOPEDIC SURGERY      right total knee replacement     SOFT TISSUE SURGERY               Family History:   FAMILY HISTORY:   Family History   Problem Relation Age of Onset     Depression Mother      Substance Abuse Father      Substance Abuse Paternal Grandfather      Substance Abuse Sister      Anxiety Disorder Son      Substance Abuse Brother      Depression Brother      Anxiety Disorder Brother      Substance Abuse Son      Anxiety Disorder Son      Depression Son            Social History:   Please see the full psychosocial profile from the clinical treatment coordinator.   SOCIAL HISTORY:   Social History     Tobacco Use     Smoking status: Current Every Day Smoker     Packs/day: 0.50     Years: 20.00     Pack years: 10.00     Types: Cigarettes     Smokeless tobacco: Never Used   Substance Use Topics     Alcohol use: Yes     Comment: 1 pint/day Fireball            Physical ROS:   The patient endorsed nausea. The remainder of 10-point review of systems was negative except as noted in HPI.         PTA Medications:     Medications Prior to Admission   Medication Sig Dispense Refill Last Dose     folic acid (FOLVITE) 1 MG tablet Take 1 tablet (1 mg) by mouth daily 30 tablet 0 Unknown at Unknown time     multivitamin w/minerals (THERA-VIT-M) tablet Take 1 tablet by mouth daily 30 tablet 0 Unknown at Unknown time     nicotine (NICORETTE) 2 MG gum Place 1 each (2 mg) inside cheek every hour as needed for other (nicotine withdrawal symptoms) 30 each 0 Unknown at Unknown time     pantoprazole (PROTONIX) 40 MG EC tablet Take 1 tablet (40 mg) by mouth daily 30 tablet 0 10/4/2021 at Unknown time     thiamine (B-1) 100 MG tablet Take 1 tablet (100 mg) by mouth daily 30 tablet 0 Unknown at Unknown time     traZODone (DESYREL) 100 MG tablet TAKE 1 TABLET BY MOUTH EVERY NIGHT AT BEDTIME AS NEEDED   Unknown at  Unknown time     vortioxetine (TRINTELLIX) 20 MG tablet Take 1 tablet (20 mg) by mouth daily 30 tablet 0 10/4/2021 at Unknown time     albuterol (PROAIR HFA/PROVENTIL HFA/VENTOLIN HFA) 108 (90 Base) MCG/ACT inhaler Inhale 2 puffs into the lungs every 6 hours as needed for shortness of breath / dyspnea or wheezing 6.7 g 0           Allergies:   No Known Allergies       Labs:     Recent Results (from the past 48 hour(s))   Alcohol breath test POCT    Collection Time: 10/04/21  3:35 PM   Result Value Ref Range    Alcohol Breath Test 0.282 (A) 0.00 - 0.01   Drug abuse screen 1 urine (ED)    Collection Time: 10/04/21  3:52 PM   Result Value Ref Range    Amphetamines Urine Screen Negative Screen Negative    Barbiturates Urine Screen Negative Screen Negative    Benzodiazepines Urine Screen Positive (A) Screen Negative    Cannabinoids Urine Screen Negative Screen Negative    Cocaine Urine Screen Negative Screen Negative    Opiates Urine Screen Negative Screen Negative   EKG 12-lead, tracing only    Collection Time: 10/04/21  4:25 PM   Result Value Ref Range    Systolic Blood Pressure  mmHg    Diastolic Blood Pressure  mmHg    Ventricular Rate 90 BPM    Atrial Rate 90 BPM    VA Interval 166 ms    QRS Duration 90 ms     ms    QTc 457 ms    P Axis 51 degrees    R AXIS 34 degrees    T Axis 35 degrees    Interpretation ECG       Sinus rhythm  Septal infarct , age undetermined  Abnormal ECG     Asymptomatic COVID-19 Virus (Coronavirus) by PCR Oropharynx    Collection Time: 10/04/21  4:43 PM    Specimen: Oropharynx; Swab   Result Value Ref Range    SARS CoV2 PCR Negative Negative   Comprehensive metabolic panel    Collection Time: 10/04/21  4:47 PM   Result Value Ref Range    Sodium 140 133 - 144 mmol/L    Potassium 3.7 3.4 - 5.3 mmol/L    Chloride 106 94 - 109 mmol/L    Carbon Dioxide (CO2) 28 20 - 32 mmol/L    Anion Gap 6 3 - 14 mmol/L    Urea Nitrogen 10 7 - 30 mg/dL    Creatinine 0.65 (L) 0.66 - 1.25 mg/dL    Calcium 7.9  (L) 8.5 - 10.1 mg/dL    Glucose 87 70 - 99 mg/dL    Alkaline Phosphatase 119 40 - 150 U/L    AST 74 (H) 0 - 45 U/L    ALT 80 (H) 0 - 70 U/L    Protein Total 8.0 6.8 - 8.8 g/dL    Albumin 4.1 3.4 - 5.0 g/dL    Bilirubin Total 0.3 0.2 - 1.3 mg/dL    GFR Estimate >90 >60 mL/min/1.73m2   Troponin I    Collection Time: 10/04/21  4:47 PM   Result Value Ref Range    Troponin I <0.015 0.000 - 0.045 ug/L   CBC with platelets and differential    Collection Time: 10/04/21  4:47 PM   Result Value Ref Range    WBC Count 4.5 4.0 - 11.0 10e3/uL    RBC Count 4.82 4.40 - 5.90 10e6/uL    Hemoglobin 15.0 13.3 - 17.7 g/dL    Hematocrit 45.0 40.0 - 53.0 %    MCV 93 78 - 100 fL    MCH 31.1 26.5 - 33.0 pg    MCHC 33.3 31.5 - 36.5 g/dL    RDW 16.8 (H) 10.0 - 15.0 %    Platelet Count 174 150 - 450 10e3/uL    % Neutrophils 30 %    % Lymphocytes 58 %    % Monocytes 7 %    % Eosinophils 4 %    % Basophils 1 %    % Immature Granulocytes 0 %    NRBCs per 100 WBC 0 <1 /100    Absolute Neutrophils 1.4 (L) 1.6 - 8.3 10e3/uL    Absolute Lymphocytes 2.6 0.8 - 5.3 10e3/uL    Absolute Monocytes 0.3 0.0 - 1.3 10e3/uL    Absolute Eosinophils 0.2 0.0 - 0.7 10e3/uL    Absolute Basophils 0.1 0.0 - 0.2 10e3/uL    Absolute Immature Granulocytes 0.0 <=0.0 10e3/uL    Absolute NRBCs 0.0 10e3/uL   D dimer quantitative    Collection Time: 10/04/21  5:54 PM   Result Value Ref Range    D-Dimer Quantitative 0.35 0.00 - 0.50 ug/mL FEU          Physical and Psychiatric Examination:     BP (!) 151/99   Pulse 111   Temp 98  F (36.7  C) (Temporal)   Resp 16   Wt 104.3 kg (230 lb)   SpO2 95%   BMI 33.00 kg/m    Weight is 230 lbs 0 oz  Body mass index is 33 kg/m .    Physical Exam:  I have reviewed the physical exam as documented by the medical team and agree with findings and assessment and have no additional findings to add at this time.    Mental Status Exam:  Appearance: awake, alert and adequately groomed  Attitude:  cooperative  Eye Contact:  good  Mood:   anxious  Affect:  mood congruent  Speech:  clear, coherent  Language: fluent and intact in English  Psychomotor, Gait, Musculoskeletal:  tremor observed   Thought Process:  goal oriented  Associations:  no loose associations  Thought Content:  no evidence of suicidal ideation or homicidal ideation and no evidence of psychotic thought  Insight:  fair  Judgement:  intact  Oriented to:  time, person, and place  Attention Span and Concentration:  intact  Recent and Remote Memory:  fair  Fund of Knowledge:  appropriate         Admission Diagnoses:   Alcohol withdrawal with unspecified complication   MDD, recurrent, moderate         Assessment & Plan:     1) MSSA with Valium.   2) Continue Trintellix.   3) Patient to be provided information on Naltrexone and Campral.   4) CTC to provide information on CD treatment.     Disposition Plan   Reason for ongoing admission: requires detoxification from substance that poses a risk of bodily harm during withdrawal period  Discharge location: Chemical dependency treatment facility  Discharge Medications: not ordered  Follow-up Appointments: not scheduled  Legal Status: voluntary    Entered by: Rizwan Hernandez on 10/5/2021 at 5:41 AM

## 2021-10-05 NOTE — CONSULTS
[facility]  Consult Note - Hospitalist Service     Date of Admission:  10/4/2021  Consult Requested by: Beto Kaur MD  Reason for Consult: General Medical Evaluation, Co-management of alcohol detox     Assessment & Plan   Mark Berumen is a 56 year old male with PMHx of alcohol use disorder, GERD, asthma and depression who is admitted on 10/4/2021 to inpatient psychiatry with alcohol withdrawal.       # Alcohol use disorder with acute withdrawal - Drinking alcohol daily for the past week since discharge, 1 pint to 1 quart of vodka daily. + withdrawal seizure in past in 2015 in setting of stopping drinking suddenly at home.     - Agree with [facility] protocol, Management per psychiatry team  - Agree with MVI, folic acid and thiamine supplementation     # Depression- PTA on Vortioxetine 20mg daily.   - Management per psychiatry team    # Transaminits - ALT 80, AST 74. Normal T bili and alk phos. Likely due to alcohol use  - Alcohol cessation and repeat LFTs in 1 week with PCP     # Right wrist fracture Fall with subsequent R wrist fracture, on 8/29 in [place] s/p thumb spica splint on R wrist in place. Patient had an appointment at [facility] on 9/14 with orthopedics provider in [place], [place] but he did not follow up. Reports taking off splint due to it getting soiled and wet. States the pain is still present, but improves with tylenol. Denies any numbness or tingling or weakness.  - Repeat xray of R wrist - with continues to show a stable non-displaced fracture of distal radius. Discussed with orthopedic who recommended that patient continue to wear the soft brace for another two and follow up with orthopedics in outpatient.   - Follow-up up with Orthpedics provider at [facility]  - Tylenol PRN for pain      # Elevated blood pressure  Denies hx of HTN. /116 in setting of alcohol withdrawal. Asymptomatic.   - Clonidine 0.1mg BID PRN for SBP >170 or DBP >110  - Please notify  medicine if SBP >170 or DBP >110 or SBP >140 or DBP >90 when out of withdrawal period  - Will need close follow up with PCP for BP monitoring     # GERD- Continue PTA Pantoprazole 40mg daily      # Asthma- On PTA Albuterol PRN. Reports using this daily when he has bad allergies. Requesting refill of inhaler, will send to pharmacy. CXR clear on admission.   - Schedule albuterol QID and PRN  - Please notify medicine if febrile >100.4F, worsening dyspnea, or hypoxia   - Follow up with PCP to discuss maintenance inhaler     # Microscopic hematuria - UA with microscopic hematuria. Patient with chronic microscopic hematuria per chart review. Patient denies any urinary symptoms.   - Referral urology for outpatient follow up and evaluation.         Medicine will sign off. No further recommendations at this time. Please page the on-call NICOL for any intercurrent medical issues which arise.       Victoria Cuevas PA-C  [facility]  Securely message with the Vocera Web Console (learn more here)  Text page via [employer] [address] Paging/Directory      Clinically Significant Risk Factors Present on Admission          # Hypocalcemia: Ca = 7.9 mg/dL (Ref range: 8.5 - 10.1 mg/dL) and/or iCa = N/A on admission, will replace as needed          ______________________________________________________________________    Chief Complaint   Alcohol detox     History is obtained from the patient    History of Present Illness   Mark Berumen is a 56 year old male with PMHx of alcohol use disorder, GERD, asthma and depression who is admitted on 10/4/2021 to inpatient psychiatry with alcohol withdrawal.    Patient reports coming in for alcohol detox.  Was drinking 1 pint of vodka daily.  Patient reports a history of withdrawal seizures in the past last in 2015 in the setting of stopping alcohol at home suddenly.  Patient states he was sober for approximately 2 years until camping trip in September and has had  difficulty staying sober since.  Last drink was yesterday morning.  Patient denies any fevers, chills, chest pain, shortness of breath, diarrhea, constipation, abdominal pain, or urinary symptoms. Reports some nausea and vomiting with withdrawal.  Patient reports some right wrist pain approximately 4/10 in severity.  Reports ibuprofen helps the pain.  States did not follow-up with orthopedics.  His plaster splint was in place but he got soaked with water when he was working so he removed it and has a soft splint in place.    Review of Systems   The 10 point Review of Systems is negative other than noted in the HPI or here.     Past Medical History    I have reviewed this patient's medical history and updated it with pertinent information if needed.   Past Medical History:   Diagnosis Date     ADD (attention deficit disorder)      Arthritis      Chronic pain      Psoriasis      Substance abuse (H)     Etoh     Uncomplicated asthma        Past Surgical History   I have reviewed this patient's surgical history and updated it with pertinent information if needed.  Past Surgical History:   Procedure Laterality Date     ABDOMEN SURGERY       BACK SURGERY       BIOPSY      skin cancer melanoma     HEAD & NECK SURGERY       HIP SURGERY       neck fusions       ORTHOPEDIC SURGERY      right total knee replacement     SOFT TISSUE SURGERY         Social History   I have reviewed this patient's social history and updated it with pertinent information if needed.  Social History     Tobacco Use     Smoking status: Current Every Day Smoker     Packs/day: 0.50     Years: 20.00     Pack years: 10.00     Types: Cigarettes     Smokeless tobacco: Never Used   Substance Use Topics     Alcohol use: Yes     Comment: 1 pint/day Fireball     Drug use: No       Family History   I have reviewed this patient's family history and updated it with pertinent information if needed.  Family History   Problem Relation Age of Onset     Depression  Mother      Substance Abuse Father      Substance Abuse Paternal Grandfather      Substance Abuse Sister      Anxiety Disorder Son      Substance Abuse Brother      Depression Brother      Anxiety Disorder Brother      Substance Abuse Son      Anxiety Disorder Son      Depression Son        Medications   I have reviewed this patient's current medications  Current Facility-Administered Medications   Medication     acetaminophen (TYLENOL) tablet 650 mg     albuterol (PROAIR HFA/PROVENTIL HFA/VENTOLIN HFA) 108 (90 Base) MCG/ACT inhaler 2 puff     alum & mag hydroxide-simethicone (MAALOX) suspension 30 mL     cloNIDine (CATAPRES) tablet 0.1 mg     diazepam (VALIUM) tablet 5-20 mg     folic acid (FOLVITE) tablet 1 mg     hydrOXYzine (ATARAX) tablet 25 mg     loperamide (IMODIUM) capsule 2 mg     melatonin tablet 3 mg     multivitamin w/minerals (THERA-VIT-M) tablet 1 tablet     nicotine (NICORETTE) gum 2 mg     ondansetron (ZOFRAN-ODT) ODT tab 4 mg     pantoprazole (PROTONIX) EC tablet 40 mg     polyethylene glycol (MIRALAX) Packet 17 g     thiamine (B-1) tablet 100 mg     vortioxetine (TRINTELLIX) tablet 20 mg       Allergies   No Known Allergies    Physical Exam   Vital Signs: Temp: 97.1  F (36.2  C) Temp src: Temporal BP: (!) 185/94 Pulse: 109   Resp: 16 SpO2: 93 % O2 Device: None (Room air)    Weight: 230 lbs 0 oz  GENERAL: Alert and oriented x 3. NAD. Pleasant and conversational   HEENT: AT/NC. Anicteric sclera. Mucous membranes moist   CARDIOVASCULAR: RRR. S1, S2. No murmurs, rubs, or gallops.   RESPIRATORY: Effort normal on RA. Clear to auscultation bilaterally, no rales, rhonchi or wheezes, respirations unlabored   GI: Abdomen soft, non-tender abdomen without rebound or guarding, normoactive bowel sounds present  MUSCULOSKELETAL: R wrist in soft splint. Mild R wrist edema. TTP at distal aspect of radius. ROM limited due to pain. Sensation intact. Radial pulse +2. 5/5  strength.   EXTREMITIES: No peripheral  edema.   NEUROLOGICAL: CN II-XII grossly intact. Moving all extremities symmetrically.  SKIN: Intact. Warm and dry. No jaundice.     Data   Results for orders placed or performed during the hospital encounter of 10/04/21 (from the past 24 hour(s))   Alcohol breath test POCT   Result Value Ref Range    Alcohol Breath Test 0.282 (A) 0.00 - 0.01   Urine Drugs of Abuse Screen    Narrative    The following orders were created for panel order Urine Drugs of Abuse Screen.  Procedure                               Abnormality         Status                     ---------                               -----------         ------                     Drug abuse screen 1 urin...[[id number]]  Abnormal            Final result                 Please view results for these tests on the individual orders.   Drug abuse screen 1 urine (ED)   Result Value Ref Range    Amphetamines Urine Screen Negative Screen Negative    Barbiturates Urine Screen Negative Screen Negative    Benzodiazepines Urine Screen Positive (A) Screen Negative    Cannabinoids Urine Screen Negative Screen Negative    Cocaine Urine Screen Negative Screen Negative    Opiates Urine Screen Negative Screen Negative   EKG 12-lead, tracing only   Result Value Ref Range    Systolic Blood Pressure  mmHg    Diastolic Blood Pressure  mmHg    Ventricular Rate 90 BPM    Atrial Rate 90 BPM    [place] Interval 166 ms    QRS Duration 90 ms     ms    QTc 457 ms    P Axis 51 degrees    R AXIS 34 degrees    T Axis 35 degrees    Interpretation ECG       Sinus rhythm  Septal infarct , age undetermined  Abnormal ECG  Unconfirmed report - interpretation of this ECG is computer generated - see medical record for final interpretation    Confirmed by - EMERGENCY ROOM, PHYSICIAN ([id number]),  VIRAJ SORIANO ([id number]) on 10/5/2021 7:28:11 AM     Asymptomatic COVID-19 Virus (Coronavirus) by PCR Oropharynx    Specimen: Oropharynx; Swab   Result Value Ref Range    SARS CoV2 PCR Negative Negative     Narrative    Testing was performed using the Xpert Xpress SARS-CoV-2 Assay on the  Cepheid Gene-Xpert Instrument Systems. Additional information about  this Emergency Use Authorization (EUA) assay can be found via the Lab  Guide. This test should be ordered for the detection of SARS-CoV-2 in  individuals who meet SARS-CoV-2 clinical and/or epidemiological  criteria. Test performance is unknown in asymptomatic patients. This  test is for in vitro diagnostic use under the FDA EUA for  laboratories certified under CLIA to perform high complexity testing.  This test has not been FDA cleared or approved. A negative result  does not rule out the presence of PCR inhibitors in the specimen or  target RNA in concentration below the limit of detection for the  assay. The possibility of a false negative should be considered if  the patient's recent exposure or clinical presentation suggests  COVID-19. This test was validated by the [facility]. This laboratory is certified under  the Clinical Laboratory Improvement Amendments of 1988 (CLIA-88) as  qualified to perform high complexity laboratory testing.     CBC with platelets differential    Narrative    The following orders were created for panel order CBC with platelets differential.  Procedure                               Abnormality         Status                     ---------                               -----------         ------                     CBC with platelets and d...[[id number]]  Abnormal            Final result                 Please view results for these tests on the individual orders.   Comprehensive metabolic panel   Result Value Ref Range    Sodium 140 133 - 144 mmol/L    Potassium 3.7 3.4 - 5.3 mmol/L    Chloride 106 94 - 109 mmol/L    Carbon Dioxide (CO2) 28 20 - 32 mmol/L    Anion Gap 6 3 - 14 mmol/L    Urea Nitrogen 10 7 - 30 mg/dL    Creatinine 0.65 (L) 0.66 - 1.25 mg/dL    Calcium 7.9 (L) 8.5 - 10.1  mg/dL    Glucose 87 70 - 99 mg/dL    Alkaline Phosphatase 119 40 - 150 U/L    AST 74 (H) 0 - 45 U/L    ALT 80 (H) 0 - 70 U/L    Protein Total 8.0 6.8 - 8.8 g/dL    Albumin 4.1 3.4 - 5.0 g/dL    Bilirubin Total 0.3 0.2 - 1.3 mg/dL    GFR Estimate >90 >60 mL/min/1.73m2   Troponin I   Result Value Ref Range    Troponin I <0.015 0.000 - 0.045 ug/L   CBC with platelets and differential   Result Value Ref Range    WBC Count 4.5 4.0 - 11.0 10e3/uL    RBC Count 4.82 4.40 - 5.90 10e6/uL    Hemoglobin 15.0 13.3 - 17.7 g/dL    Hematocrit 45.0 40.0 - 53.0 %    MCV 93 78 - 100 fL    MCH 31.1 26.5 - 33.0 pg    MCHC 33.3 31.5 - 36.5 g/dL    RDW 16.8 (H) 10.0 - 15.0 %    Platelet Count 174 150 - 450 10e3/uL    % Neutrophils 30 %    % Lymphocytes 58 %    % Monocytes 7 %    % Eosinophils 4 %    % Basophils 1 %    % Immature Granulocytes 0 %    NRBCs per 100 WBC 0 <1 /100    Absolute Neutrophils 1.4 (L) 1.6 - 8.3 10e3/uL    Absolute Lymphocytes 2.6 0.8 - 5.3 10e3/uL    Absolute Monocytes 0.3 0.0 - 1.3 10e3/uL    Absolute Eosinophils 0.2 0.0 - 0.7 10e3/uL    Absolute Basophils 0.1 0.0 - 0.2 10e3/uL    Absolute Immature Granulocytes 0.0 <=0.0 10e3/uL    Absolute NRBCs 0.0 10e3/uL   XR Chest 2 Views    Narrative    EXAM: XR CHEST 2 VW  LOCATION: [facility]  DATE/TIME: 10/4/2021 4:52 PM    INDICATION: cough  COMPARISON: None.      Impression    IMPRESSION: Negative chest.   D dimer quantitative   Result Value Ref Range    D-Dimer Quantitative 0.35 0.00 - 0.50 ug/mL FEU    Narrative    This D-dimer assay is intended for use in conjunction with a clinical pretest probability assessment model to exclude pulmonary embolism (PE) and deep venous thrombosis (DVT) in outpatients suspected of PE or DVT. The cut-off value is 0.50 ug/mL FEU.   XR Wrist Right G/E 3 Views    Narrative    3 views right wrist radiographs 10/5/2021 10:19 AM    History: Prior fracture of wrist. continued pain. patinet  removed  splint    Additional History from EMR: Per emergency note dated 10/4/2021,  patient had a recent fall with injury to the right hand and was found  to have a nondisplaced fracture in place S1.    Comparison: 9/20/2021    Findings:    PA, oblique and lateral view(s) of the right  wrist were obtained.     Redemonstration of right nondisplaced distal radial fracture with  minimally increased conspicuity of the fracture line.    Soft tissue is unremarkable.      Impression    Impression:  Stable nondisplaced right distal radial fracture.    I have personally reviewed the examination and initial interpretation  and I agree with the findings.    MELODY VEGA MD (Joe)         SYSTEM ID:  [id number]

## 2021-10-05 NOTE — PROGRESS NOTES
PDMP as of 10/5/2021:    Mark Berumen  Risk Indicators  NARX SCORES  Narcotic  080  Sedative  050  Stimulant  000  Explanation and GuidanceOVERDOSE RISK SCORE 200  (Range 000-999)  Explanation and Guidance  ADDITIONAL RISK INDICATORS (0)  Explanation and GuidanceThis NarxCare report is based on search criteria supplied and the data entered by the dispensing pharmacy. For more information about any prescription, please contact the dispensing pharmacy or the prescriber. NarxCare scores and reports are intended to aid, not replace, medical decision making. None of the information presented should be used as sole justification for providing or refusing to provide medications. The information on this report is not warranted as accurate or complete.  Graphs  RX GRAPH   Narcotic  Buprenorphine  Sedative  Stimulant  Other  All Prescribers  Prescribers  2 - Otis Ontiveros,  1 - Ciro Godwin,  Timeline  10/05  2m  6m  1y  2y  Buprenorphine mg  16  4  0  28  Timeline  10/05  2m  6m  1y  2y  Morphine MgEq (MME)  200  80  0  320  Timeline  10/05  2m  6m  1y  2y  Lorazepam MgEq (LME)  10  2  0  18  Timeline  10/05  2m  6m  1y  2y  *Per CDC guidance, the MME conversion factors prescribed or provided as part of the medication-assisted treatment for opioid use disorder should not be used to benchmark against dosage thresholds meant for opioids prescribed for pain. Buprenorphine products have no agreed upon morphine equivalency, and as partial opioid agonists, are not expected to be associated with overdose risk in the same dose-dependent manner as doses for full agonist opioids. MME = morphine milligram equivalents. LME = Lorazepam milligram equivalents. mg = dose in milligrams.  Summary  Summary  Total Prescriptions:  9  Total Prescribers:  2  Total Pharmacies:  2  Narcotics* (excluding Buprenorphine)  Current Qty:  0  Current MME/day:  0.00  30 Day Avg MME/day:  0.00  Sedatives*  Current Qty:  0  Current LME/day:  0.00  30  Day Avg LME/day:  0.00  Buprenorphine*  Current Qty:  0  Current mg/day:  0.00  30 Day Avg mg/day:  0.00  Rx Data  PRESCRIPTIONS  Total Prescriptions: 9  Total Private Pay: 0  Fill Date ID Written Sold Drug Qty Days Prescriber Rx # Pharmacy Refill Daily Dose * Pymt Type   08/29/2021 1 08/28/2021 08/29/2021 Hydrocodone-Acetamin 5-325 Mg  10.00 2 Ma Her [id number] [facility] (1273) 0/0 25.00 MME [place]  04/15/2021 4 01/26/2021 04/15/2021 Gabapentin 300 Mg Capsule  60.00 30 Mi Brian [id number] [facility] ([id number]) 2/2  [place]icaid [place]  03/18/2021 3 01/26/2021 03/18/2021 Gabapentin 300 Mg Capsule  60.00 30 Mi Brian [id number] [facility] (9445) 1/2  [place]  02/23/2021 2 02/15/2021 02/23/2021 Gabapentin 300 Mg Capsule  60.00 30 Mi Brian [id number] [facility] (9445) 0/0  [place]  02/01/2021 2 01/26/2021 02/01/2021 Gabapentin 300 Mg Capsule  60.00 30 Mi Brian [id number] [facility] (9445) 0/2  [place]  01/11/2021 2 11/04/2020 01/11/2021 Gabapentin 300 Mg Capsule  60.00 30 Mi Brian [id number] [facility] (9445) 2/2  [place]  12/14/2020 2 11/04/2020 12/14/2020 Gabapentin 300 Mg Capsule  60.00 30 Mi Brian [id number] [facility] (9445) 1/2  [place]  11/16/2020 2 11/04/2020 11/16/2020 Gabapentin 300 Mg Capsule  60.00 30 Mi Brian [id number] [facility] (9445) 0/2  [place]  10/20/2020 2 09/09/2020 10/20/2020 Gabapentin 300 Mg Capsule  60.00 30 Mi Brian [id number] [facility] (9445) 1/1  [place]  *Per CDC guidance, the MME conversion factors prescribed or provided as part of the medication-assisted treatment for opioid use disorder should not be used to benchmark against dosage thresholds meant for opioids prescribed for pain. Buprenorphine products have no agreed upon morphine equivalency, and as partial opioid agonists, are not expected to be associated with overdose risk in the same dose-dependent manner as doses for full agonist opioids. MME = morphine milligram equivalents. LME = Lorazepam milligram equivalents. mg = dose in milligrams.  Providers  Total Providers:  2  Name Address City State Zipcode Phone  Otis Ontiveros  [address] [facility] [address] [phone]  Ciro Godwin Aprn, Cnp [address] [place] [address] [phone]  Pharmacies  Total Pharmacies: 2  Name Address [facility] Zipcode Phone  [facility] ([phone]) [address] [facility] [address] [phone]  [facility] [phone]) [address] [place] [address] [place] [address] [phone]  The report provided is based upon the search criteria entered and the corresponding data as it has been reported by dispenser(s). If erroneous information is identified or additional information is needed, please contact the dispenser or the prescriber provided on the report. Date Sold signifies the date the prescription was sold (left the pharmacy). The absence of Date Sold does not necessarily indicate the prescription was not dispensed. Fill Date represents the date the medication was filled or prepared by the pharmacy. Note, federal regulation (CFR Title 42: Part 2) requires patient consent prior to releasing certain patient data from federally funded opioid treatment programs (OTPs). As such, controlled substances dispensed from OTPs for medication-assisted treatment may not appear in the [place]  report. Morphine milligram equivalent (MME) conversion factors published by the CDC are used in the MME calculation. Per the CDC, the MME conversion factor is intended only for analytic purposes where prescription data are used to retrospectively calculate daily MME to inform analyses of risks associated with opioid prescribing. This value does not constitute clinical guidance or recommendations for converting patients from one form of opioid analgesic to another. Per the CDC, the conversion factors for drugs prescribed or provided, as part of medication-assisted treatment for opioid use disorder should not be used to benchmark against MME dosage thresholds meant for opioids prescribed for pain.  Buprenorphine products listed in the CDC s MME file do not have an associated conversion factor. Lastly, the CDC notes, in clinical practice, calculating MME for methadone often involves a sliding-scale approach, whereby the conversion factor increases with increasing dose. The conversion factor of 3 for methadone presented in this file could underestimate MME for a given patient. This report contains confidential information, including patient identifiers, and is not a public record. The information on this report must be treated as protected health information and is only to be disclosed to others as authorized by applicable state and Federal regulations.

## 2021-10-05 NOTE — PROGRESS NOTES
10/05/21 0154   Patient Belongings   Did you bring any home meds/supplements to the hospital?  Yes   Disposition of meds  Sent to security/pharmacy per site process;Other (see comment)   Patient Belongings Put in Hospital Secure Location (Security or Locker, etc.) other (see comments)   Belongings Search Yes   Clothing Search Yes   Second Staff Kp   Comment See notes     Large bin:  -Bag, sweat shirt w/string, belt, loose coins, slippers,   Small bin:  -Phone, wallet  Security # [id number]  - 3 Visa Cards  Contraband: can opener  A               Admission:  I am responsible for any personal items that are not sent to the safe or pharmacy.  [facility] is not responsible for loss, theft or damage of any property in my possession.    Signature:  _________________________________ Date: _______  Time: _____                                              Staff Signature:  ____________________________ Date: ________  Time: _____      2nd Staff person, if patient is unable/unwilling to sign:    Signature: ________________________________ Date: ________  Time: _____     Discharge:  [facility] has returned all of my personal belongings:    Signature: _________________________________ Date: ________  Time: _____                                          Staff Signature:  ____________________________ Date: ________  Time: _____

## 2021-10-05 NOTE — PLAN OF CARE
Patient has experienced a largely positive day on Harrington 3A.  He reports absence of all suicidal ideation while on harrington.  He is medication compliant, taking withdrawal-related medications as recommended for his moderate symptoms.  He is benignly pleasant of disposition,   Will continue to monitor as prudent.

## 2021-10-06 LAB
CHOLEST SERPL-MCNC: 314 MG/DL
GGT SERPL-CCNC: 95 U/L (ref 0–75)
HDLC SERPL-MCNC: 81 MG/DL
LDLC SERPL CALC-MCNC: 187 MG/DL
NONHDLC SERPL-MCNC: 233 MG/DL
TRIGL SERPL-MCNC: 232 MG/DL
TSH SERPL DL<=0.005 MIU/L-ACNC: 3.02 MU/L (ref 0.4–4)

## 2021-10-06 PROCEDURE — 99232 SBSQ HOSP IP/OBS MODERATE 35: CPT | Performed by: PSYCHIATRY & NEUROLOGY

## 2021-10-06 PROCEDURE — 36415 COLL VENOUS BLD VENIPUNCTURE: CPT | Performed by: PSYCHIATRY & NEUROLOGY

## 2021-10-06 PROCEDURE — 250N000013 HC RX MED GY IP 250 OP 250 PS 637: Performed by: EMERGENCY MEDICINE

## 2021-10-06 PROCEDURE — 250N000011 HC RX IP 250 OP 636: Performed by: PSYCHIATRY & NEUROLOGY

## 2021-10-06 PROCEDURE — 250N000013 HC RX MED GY IP 250 OP 250 PS 637: Performed by: PSYCHIATRY & NEUROLOGY

## 2021-10-06 PROCEDURE — 82465 ASSAY BLD/SERUM CHOLESTEROL: CPT | Performed by: PSYCHIATRY & NEUROLOGY

## 2021-10-06 PROCEDURE — 82977 ASSAY OF GGT: CPT | Performed by: PSYCHIATRY & NEUROLOGY

## 2021-10-06 PROCEDURE — 250N000013 HC RX MED GY IP 250 OP 250 PS 637: Performed by: PHYSICIAN ASSISTANT

## 2021-10-06 PROCEDURE — 84443 ASSAY THYROID STIM HORMONE: CPT | Performed by: PSYCHIATRY & NEUROLOGY

## 2021-10-06 PROCEDURE — 99207 PR CDG-MDM COMPONENT: MEETS MODERATE - DOWN CODED: CPT | Performed by: PSYCHIATRY & NEUROLOGY

## 2021-10-06 PROCEDURE — 128N000004 HC R&B CD ADULT

## 2021-10-06 RX ADMIN — VORTIOXETINE 20 MG: 10 TABLET, FILM COATED ORAL at 08:26

## 2021-10-06 RX ADMIN — MULTIPLE VITAMINS W/ MINERALS TAB 1 TABLET: TAB at 08:26

## 2021-10-06 RX ADMIN — ALBUTEROL SULFATE 2 PUFF: 90 AEROSOL, METERED RESPIRATORY (INHALATION) at 12:30

## 2021-10-06 RX ADMIN — IBUPROFEN 600 MG: 600 TABLET ORAL at 12:30

## 2021-10-06 RX ADMIN — DIAZEPAM 10 MG: 5 TABLET ORAL at 05:03

## 2021-10-06 RX ADMIN — FOLIC ACID 1 MG: 1 TABLET ORAL at 08:26

## 2021-10-06 RX ADMIN — DIAZEPAM 10 MG: 5 TABLET ORAL at 16:29

## 2021-10-06 RX ADMIN — ALBUTEROL SULFATE 2 PUFF: 90 AEROSOL, METERED RESPIRATORY (INHALATION) at 08:45

## 2021-10-06 RX ADMIN — DIAZEPAM 10 MG: 5 TABLET ORAL at 20:35

## 2021-10-06 RX ADMIN — THIAMINE HCL TAB 100 MG 100 MG: 100 TAB at 08:26

## 2021-10-06 RX ADMIN — ONDANSETRON 4 MG: 4 TABLET, ORALLY DISINTEGRATING ORAL at 08:26

## 2021-10-06 RX ADMIN — PANTOPRAZOLE SODIUM 40 MG: 40 TABLET, DELAYED RELEASE ORAL at 08:26

## 2021-10-06 RX ADMIN — IBUPROFEN 600 MG: 600 TABLET ORAL at 20:35

## 2021-10-06 RX ADMIN — DIAZEPAM 10 MG: 5 TABLET ORAL at 08:26

## 2021-10-06 RX ADMIN — DIAZEPAM 10 MG: 5 TABLET ORAL at 12:30

## 2021-10-06 RX ADMIN — CLONIDINE HYDROCHLORIDE 0.1 MG: 0.1 TABLET ORAL at 16:29

## 2021-10-06 RX ADMIN — LOPERAMIDE HYDROCHLORIDE 2 MG: 2 CAPSULE ORAL at 16:29

## 2021-10-06 ASSESSMENT — ACTIVITIES OF DAILY LIVING (ADL)
DRESS: STREET CLOTHES;INDEPENDENT
ORAL_HYGIENE: INDEPENDENT
HYGIENE/GROOMING: HANDWASHING;INDEPENDENT

## 2021-10-06 NOTE — PROGRESS NOTES
[facility] faxed for auth for LP.  [facility] will not have beds until next week.  Assessment completed and available in note dated 09/23/21.

## 2021-10-06 NOTE — PLAN OF CARE
Problem: Suicidal Behavior  Goal: Suicidal Behavior is Absent or Managed  10/5/2021 2346 by Sol Hugo, RN  Outcome: Improving  10/5/2021 2338 by Sol Hugo, RN  Outcome: Improving     Problem: Acute Neurologic Deterioration (Alcohol Withdrawal)  Goal: Optimal Neurologic Function  Outcome: No Change   Patient visible in milieu, social with peers. Patient affect flat, blunted. Mood is calm. Patient denies SI, SIB, HI, or hallucinations. Patient continues to be monitored for alcohol withdrawal. MSSA scores 9 and 10. Patient was given 10 mg of Valium times 2 this evening. Patient also had prn tylenol for 7/10 wrist pain, and prn ibuprofen for 7/10 wrist pain. Patient reported improvement with ibuprofen. Patient VSS, except elevated BP. Did not meet parameters for prn clonidine. Patient reports appetite is decreased. Patient denies any additional concerns. BP (!) 163/101 (BP Location: Left arm)   Pulse 99   Temp 97.6  F (36.4  C) (Temporal)   Resp 16   Wt 104.3 kg (230 lb)   SpO2 95%   BMI 33.00 kg/m

## 2021-10-06 NOTE — PROGRESS NOTES
MSSA scores of 5/8,pt received 10mgs of valium for alcohol withdrawal this shift and is observed to sleep throughout the noc.

## 2021-10-06 NOTE — PROGRESS NOTES
"[facility], [place]   Psychiatric Progress Note        Interim History:   The patient's care was discussed with the treatment team during the daily team meeting and/or staff's chart notes were reviewed.  Staff report patient is still in withdrawal.     The patient reports that he is \"so-so.\" Sleeping \"off and on.\" Eating \"about half of my meals.\" Denies SI. Is open to starting Naltrexone. Open to [facility] or [facility] for CD treatment. Did have some \"blood in my urine\" and says that he was told to followup with a urologist.          Medications:       folic acid  1 mg Oral Daily     multivitamin w/minerals  1 tablet Oral Daily     pantoprazole  40 mg Oral Daily     thiamine  100 mg Oral Daily     vortioxetine  20 mg Oral Daily          Allergies:   No Known Allergies       Labs:     Recent Results (from the past 24 hour(s))   UA reflex to Microscopic    Collection Time: 10/05/21 12:30 PM   Result Value Ref Range    Color Urine Yellow Colorless, Straw, Light Yellow, Yellow    Appearance Urine Clear Clear    Glucose Urine Negative Negative mg/dL    Bilirubin Urine Negative Negative    Ketones Urine 100  (A) Negative mg/dL    Specific Gravity Urine 1.033 1.003 - 1.035    Blood Urine Large (A) Negative    pH Urine 6.0 5.0 - 7.0    Protein Albumin Urine 70  (A) Negative mg/dL    Urobilinogen Urine Normal Normal, 2.0 mg/dL    Nitrite Urine Negative Negative    Leukocyte Esterase Urine Negative Negative    Bacteria Urine Few (A) None Seen /HPF    RBC Urine 113 (H) <=2 /HPF    WBC Urine 1 <=5 /HPF    Squamous Epithelials Urine <1 <=1 /HPF    Mucus Urine Present (A) None Seen /LPF   Lipid panel    Collection Time: 10/06/21  7:38 AM   Result Value Ref Range    Cholesterol 314 (H) <200 mg/dL    Triglycerides 232 (H) <150 mg/dL    Direct Measure HDL 81 >=40 mg/dL    LDL Cholesterol Calculated 187 (H) <=100 mg/dL    Non HDL Cholesterol 233 (H) <130 mg/dL   GGT    Collection Time: 10/06/21  7:38 " AM   Result Value Ref Range    GGT 95 (H) 0 - 75 U/L   TSH with free T4 reflex and/or T3 as indicated    Collection Time: 10/06/21  7:38 AM   Result Value Ref Range    TSH 3.02 0.40 - 4.00 mU/L          Psychiatric Examination:     BP (!) 163/100   Pulse 91   Temp 97.9  F (36.6  C) (Temporal)   Resp 16   Wt 104.3 kg (230 lb)   SpO2 95%   BMI 33.00 kg/m    Weight is 230 lbs 0 oz  Body mass index is 33 kg/m .  Orthostatic Vitals     None            Appearance: awake, alert and adequately groomed  Attitude:  cooperative  Eye Contact:  good  Mood:  better  Affect:  mood congruent  Speech:  clear, coherent  Psychomotor Behavior:  no evidence of tardive dyskinesia, dystonia, or tics  Thought Process:  goal oriented  Associations:  no loose associations  Thought Content:  no evidence of suicidal ideation or homicidal ideation and no evidence of psychotic thought  Insight:  fair  Judgement:  intact  Oriented to:  time, person, and place  Attention Span and Concentration:  intact  Recent and Remote Memory:  fair    Clinical Global Impressions  First:  Considering your total clinical experience with this particular patient population, how severe are the patient's symptoms at this time?: 7 (10/05/21 0540)  Compared to the patient's condition at the START of treatment, this patient's condition is: 4 (10/05/21 0540)  Most recent:  Considering your total clinical experience with this particular patient population, how severe are the patient's symptoms at this time?: 7 (10/05/21 0540)  Compared to the patient's condition at the START of treatment, this patient's condition is: 4 (10/05/21 0540)         Precautions:     Behavioral Orders   Procedures     Code 1 - Restrict to Unit     Code 2     Fall precautions     Routine Programming     As clinically indicated     Seizure precautions     Status 15     Every 15 minutes.     Withdrawal precautions          Diagnoses:     Alcohol withdrawal with unspecified complication   MDD,  recurrent, moderate         Plan:     1) Continue MSSA.   2) Continue Trintellix.   3) Will start Naltrexone after detox is completed.   4) Patient likely to [facility].       Disposition Plan   Reason for ongoing admission: requires detoxification from substance that poses a risk of bodily harm during withdrawal period  Discharge location: Chemical dependency treatment facility  Discharge Medications: not ordered  Follow-up Appointments: scheduled  Legal Status: voluntary    Entered by: Rizwan Hernandez on October 6, 2021 at 11:05 AM

## 2021-10-06 NOTE — PLAN OF CARE
Problem: Alcohol Withdrawal  Goal: Alcohol Withdrawal Symptom Control  Outcome: No Change     Pt admitted for alcohol withdrawal, MSSA 11 & 9 requiring valium 10 mg X 2.  He reports he ate 50% breakfast & 75% of lunch.  Reports anxiety 7/10 and depression 5/10 but denies any thoughts plan or intent for self harm.  He reports pain in (R) wrist wearing a splint and was given ibuprofen X 1. Will monitor.

## 2021-10-07 PROCEDURE — 99232 SBSQ HOSP IP/OBS MODERATE 35: CPT | Performed by: PSYCHIATRY & NEUROLOGY

## 2021-10-07 PROCEDURE — 250N000009 HC RX 250: Performed by: PSYCHIATRY & NEUROLOGY

## 2021-10-07 PROCEDURE — 250N000013 HC RX MED GY IP 250 OP 250 PS 637: Performed by: PSYCHIATRY & NEUROLOGY

## 2021-10-07 PROCEDURE — 128N000004 HC R&B CD ADULT

## 2021-10-07 PROCEDURE — 99207 PR CDG-MDM COMPONENT: MEETS MODERATE - DOWN CODED: CPT | Performed by: PSYCHIATRY & NEUROLOGY

## 2021-10-07 PROCEDURE — 250N000013 HC RX MED GY IP 250 OP 250 PS 637: Performed by: EMERGENCY MEDICINE

## 2021-10-07 RX ADMIN — ACETAMINOPHEN 650 MG: 325 TABLET, FILM COATED ORAL at 16:23

## 2021-10-07 RX ADMIN — ALBUTEROL SULFATE 2 PUFF: 90 AEROSOL, METERED RESPIRATORY (INHALATION) at 08:56

## 2021-10-07 RX ADMIN — PANTOPRAZOLE SODIUM 40 MG: 40 TABLET, DELAYED RELEASE ORAL at 08:12

## 2021-10-07 RX ADMIN — MULTIPLE VITAMINS W/ MINERALS TAB 1 TABLET: TAB at 08:12

## 2021-10-07 RX ADMIN — ACETAMINOPHEN 650 MG: 325 TABLET, FILM COATED ORAL at 09:32

## 2021-10-07 RX ADMIN — Medication: at 20:38

## 2021-10-07 RX ADMIN — THIAMINE HCL TAB 100 MG 100 MG: 100 TAB at 08:12

## 2021-10-07 RX ADMIN — FOLIC ACID 1 MG: 1 TABLET ORAL at 08:12

## 2021-10-07 RX ADMIN — ALBUTEROL SULFATE 2 PUFF: 90 AEROSOL, METERED RESPIRATORY (INHALATION) at 12:28

## 2021-10-07 RX ADMIN — Medication: at 12:28

## 2021-10-07 RX ADMIN — Medication: at 16:24

## 2021-10-07 RX ADMIN — MELATONIN TAB 3 MG 3 MG: 3 TAB at 22:01

## 2021-10-07 RX ADMIN — IBUPROFEN 600 MG: 600 TABLET ORAL at 07:25

## 2021-10-07 RX ADMIN — IBUPROFEN 600 MG: 600 TABLET ORAL at 20:37

## 2021-10-07 RX ADMIN — IBUPROFEN 600 MG: 600 TABLET ORAL at 12:28

## 2021-10-07 RX ADMIN — VORTIOXETINE 20 MG: 10 TABLET, FILM COATED ORAL at 08:12

## 2021-10-07 ASSESSMENT — ACTIVITIES OF DAILY LIVING (ADL)
ORAL_HYGIENE: INDEPENDENT
HYGIENE/GROOMING: HANDWASHING;INDEPENDENT
DRESS: INDEPENDENT
HYGIENE/GROOMING: INDEPENDENT
ORAL_HYGIENE: INDEPENDENT
DRESS: INDEPENDENT

## 2021-10-07 NOTE — PROGRESS NOTES
"[facility], [place]   Psychiatric Progress Note        Interim History:   The patient's care was discussed with the treatment team during the daily team meeting and/or staff's chart notes were reviewed.  Staff report patient is still in withdrawal.     The patient reports that he is feeling better. Mood is better as well. Denies any current withdrawal symptoms. Does have some tooth pain and says that he was supposed to get a \"root canal.\" Sleeping \"a little because of the pain.\" Eating \"okay.\" Denies SI. Hopeful to discharge to  treatment tomorrow.          Medications:       folic acid  1 mg Oral Daily     multivitamin w/minerals  1 tablet Oral Daily     pantoprazole  40 mg Oral Daily     thiamine  100 mg Oral Daily     vortioxetine  20 mg Oral Daily          Allergies:   No Known Allergies       Labs:     No results found for this or any previous visit (from the past 24 hour(s)).       Psychiatric Examination:     BP (!) 158/88   Pulse 88   Temp 97.5  F (36.4  C) (Temporal)   Resp 16   Wt 104.3 kg (230 lb)   SpO2 95%   BMI 33.00 kg/m    Weight is 230 lbs 0 oz  Body mass index is 33 kg/m .  Orthostatic Vitals     None            Appearance: awake, alert and adequately groomed  Attitude:  cooperative  Eye Contact:  good  Mood:  better  Affect:  mood congruent  Speech:  clear, coherent  Psychomotor Behavior:  no evidence of tardive dyskinesia, dystonia, or tics  Thought Process:  goal oriented  Associations:  no loose associations  Thought Content:  no evidence of suicidal ideation or homicidal ideation and no evidence of psychotic thought  Insight:  fair  Judgement:  intact  Oriented to:  time, person, and place  Attention Span and Concentration:  intact  Recent and Remote Memory:  fair    Clinical Global Impressions  First:  Considering your total clinical experience with this particular patient population, how severe are the patient's symptoms at this time?: 7 (10/05/21 " 0540)  Compared to the patient's condition at the START of treatment, this patient's condition is: 4 (10/05/21 0540)  Most recent:  Considering your total clinical experience with this particular patient population, how severe are the patient's symptoms at this time?: 7 (10/05/21 0540)  Compared to the patient's condition at the START of treatment, this patient's condition is: 4 (10/05/21 0540)         Precautions:     Behavioral Orders   Procedures     Code 1 - Restrict to Unit     Code 2     Fall precautions     Routine Programming     As clinically indicated     Seizure precautions     Status 15     Every 15 minutes.     Withdrawal precautions          Diagnoses:     Alcohol withdrawal with unspecified complication   MDD, recurrent, moderate         Plan:     1) Continue MSSA.   2) Continue Trintellix.   3) Will start Naltrexone after detox is completed.   4) Patient likely to [facility].       Disposition Plan   Reason for ongoing admission: requires detoxification from substance that poses a risk of bodily harm during withdrawal period  Discharge location: Chemical dependency treatment facility  Discharge Medications: not ordered  Follow-up Appointments: scheduled  Legal Status: voluntary    Entered by: Rizwan Hernandez on October 7, 2021 at 1:54 PM

## 2021-10-07 NOTE — PLAN OF CARE
Problem: Alcohol Withdrawal  Goal: Alcohol Withdrawal Symptom Control  Outcome: Improving       Pt admitted for alcohol withdrawal, MSSA 7 & 4 not requiring medication this shift.  He reports he ate most of his breakfast & lunch.  Reports anxiety 5/10 and denies depression, denies any thoughts plan or intent for self harm.  He reports pain in (R) wrist wearing a splint and was given ibuprofen X 1.  He also complained of tooth pain in his upper left molar, obtained order for oral gel and given tylenol with improvement.  Will continue to monitor.

## 2021-10-07 NOTE — PLAN OF CARE
Problem: Alcohol Withdrawal  Goal: Alcohol Withdrawal Symptom Control  10/6/2021 2046 by Tamy Fraser, RN  Outcome: No Change    Pt admitted for alcohol withdrawal, MSSA 10 & 9, receiving 10 mg of valium x 2 this shift.  Pt was incontinent of stool and was given clean scrubs, a shower and imodium. He reports that he ate well and was feeling better later in the evening. Pt met parameters for PRN clonidine and vital signs improved. BP (!) 153/87 (BP Location: Left arm)   Pulse 90   Temp 97.4  F (36.3  C) (Temporal)   Resp 16   Wt 104.3 kg (230 lb)   SpO2 94%   BMI 33.00 kg/m

## 2021-10-08 VITALS
HEART RATE: 87 BPM | SYSTOLIC BLOOD PRESSURE: 157 MMHG | BODY MASS INDEX: 33 KG/M2 | DIASTOLIC BLOOD PRESSURE: 105 MMHG | OXYGEN SATURATION: 96 % | TEMPERATURE: 97.9 F | WEIGHT: 230 LBS | RESPIRATION RATE: 16 BRPM

## 2021-10-08 PROCEDURE — 250N000013 HC RX MED GY IP 250 OP 250 PS 637: Performed by: PSYCHIATRY & NEUROLOGY

## 2021-10-08 PROCEDURE — 250N000013 HC RX MED GY IP 250 OP 250 PS 637: Performed by: EMERGENCY MEDICINE

## 2021-10-08 PROCEDURE — 99239 HOSP IP/OBS DSCHRG MGMT >30: CPT | Performed by: PSYCHIATRY & NEUROLOGY

## 2021-10-08 RX ORDER — NALTREXONE HYDROCHLORIDE 50 MG/1
50 TABLET, FILM COATED ORAL DAILY
Qty: 30 TABLET | Refills: 3 | Status: SHIPPED | OUTPATIENT
Start: 2021-10-08

## 2021-10-08 RX ORDER — PANTOPRAZOLE SODIUM 40 MG/1
40 TABLET, DELAYED RELEASE ORAL DAILY
Qty: 30 TABLET | Refills: 0 | Status: SHIPPED | OUTPATIENT
Start: 2021-10-08

## 2021-10-08 RX ORDER — FOLIC ACID 1 MG/1
1 TABLET ORAL DAILY
Qty: 30 TABLET | Refills: 1 | Status: SHIPPED | OUTPATIENT
Start: 2021-10-08

## 2021-10-08 RX ORDER — IBUPROFEN 600 MG/1
600 TABLET, FILM COATED ORAL EVERY 6 HOURS PRN
Start: 2021-10-08

## 2021-10-08 RX ORDER — LANOLIN ALCOHOL/MO/W.PET/CERES
3 CREAM (GRAM) TOPICAL
Qty: 30 TABLET | Refills: 1 | Status: SHIPPED | OUTPATIENT
Start: 2021-10-08

## 2021-10-08 RX ORDER — MULTIPLE VITAMINS W/ MINERALS TAB 9MG-400MCG
1 TAB ORAL DAILY
Qty: 30 TABLET | Refills: 1 | Status: SHIPPED | OUTPATIENT
Start: 2021-10-08

## 2021-10-08 RX ADMIN — Medication 25 MG: at 08:12

## 2021-10-08 RX ADMIN — VORTIOXETINE 20 MG: 10 TABLET, FILM COATED ORAL at 08:12

## 2021-10-08 RX ADMIN — FOLIC ACID 1 MG: 1 TABLET ORAL at 08:12

## 2021-10-08 RX ADMIN — THIAMINE HCL TAB 100 MG 100 MG: 100 TAB at 08:12

## 2021-10-08 RX ADMIN — ALBUTEROL SULFATE 2 PUFF: 90 AEROSOL, METERED RESPIRATORY (INHALATION) at 12:24

## 2021-10-08 RX ADMIN — ALBUTEROL SULFATE 2 PUFF: 90 AEROSOL, METERED RESPIRATORY (INHALATION) at 08:14

## 2021-10-08 RX ADMIN — IBUPROFEN 600 MG: 600 TABLET ORAL at 08:12

## 2021-10-08 RX ADMIN — PANTOPRAZOLE SODIUM 40 MG: 40 TABLET, DELAYED RELEASE ORAL at 08:12

## 2021-10-08 RX ADMIN — MULTIPLE VITAMINS W/ MINERALS TAB 1 TABLET: TAB at 08:12

## 2021-10-08 NOTE — PLAN OF CARE
Problem: Alcohol Withdrawal  Goal: Alcohol Withdrawal Symptom Control  10/7/2021 2046 by Tamy Fraser, RN  Outcome: Completed    S: Patient scored less than 8 for greater than 24 hours. He has required no medication for withdrawal for > 24 hours.  B: Patient admitted for alcohol withdrawal and detoxification.  A: Patient stable in the alcohol withdrawal process MSSA scores < 8 for > 24 hours. Pt c/o ongoing tooth pain and (L) wrist pain, using oral gel ,Tylenol and ibuprofen as needed..  R: Patient removed from detox status per unit Protocol.

## 2021-10-08 NOTE — PROGRESS NOTES
Patient discharged to home.  Reports being picked up by sister. Patient escorted off the unit by Jesus SOLIS.        All patient belongings from room, locked bin and security were sent with patient. This RN went over discharge instructions, teachings, patient's labs, and discharge  recommendations with patient. This RN went over patient's prescribed medications being sent home with patient from pharmacy. Patient verbalizes and demonstrates understanding of all teachings. Patient denies thoughts of harm towards self or others.  Pt denies any current medical issues at this time. Patient denies any further questions and is now discharged at this time

## 2021-10-08 NOTE — PLAN OF CARE
Problem: Adult Inpatient Plan of Care  Goal: Optimal Comfort and Wellbeing  Outcome: No Change     Behavioral  Pt appeared sleeping comfortably overnight; breathing was even and unlabored.      Medical     Pt out of detox from alcohol.

## 2021-10-08 NOTE — PROGRESS NOTES
Call received from [facility].  They have scheduled Pt for admission on Wednesday October 13th at 0900.  [address] [place] [id number]. Pt and RN informed.

## 2021-10-08 NOTE — DISCHARGE INSTRUCTIONS
Behavioral Discharge Planning and Instructions  THANK YOU FOR CHOOSING THE [facility]  3A  [phone]    Summary: You were admitted to Station 3A on 10/5/21 for detoxification from alcohol.  A medical exam was performed that included lab work. You have met with a  and opted to enter [facility].  Please take care and make your recovery a priority, Mark!    You are scheduled for admission with [facility] on Wednesday, October 13th at 9:00 AM.  The program will call you on your cell phone for a brief interview prior to this date.  You must be sober for admission.  [address] ? ?[place] [address]  MEN'S RESIDENTIAL SUBSTANCE USE TREATMENT  Phone: [phone]    Main Diagnosis: Per Dr. David MD;  303.90 (F10.20) Alcohol Use Disorder Severe      Major Treatments, Procedures and Findings:  You were detoxed from alcohol with the Modified Selective Severity Protocol using Valium. You have met with a  to develop a treatment plan for discharge.  You have had labs drawn and a copy of those labs will be sent home with you.  Please bring your lab results with to your follow up doctor appointment.    Symptoms to Report:  If you experience more anxiety, confusion, sleeplessness, deep sadness or thoughts of suicide, notify your treatment team or notify your primary care physician. IF ANY OF THE SYMPTOMS YOU ARE EXPERIENCING ARE A MEDICAL EMERGENCY CALL 911 IMMEDIATELY.     Lifestyle Adjustment: Adjust your lifestyle to get enough sleep, relaxation, exercise and  good nutrition. Continue to develop healthy coping skills to decrease stress and promote a sober living environment. Do not use alcohol, illegal drugs or addictive medications other than what is currently prescribed. AA, NA, and  Sponsor are excellent resources for support.     Primary Provider:  Dr. Freire  [facility] location is located near the  "intersection of [address] in [place], [place].  [facility]  [address], [address]  [place] [address]  [phone]    Appointment: Nov. 27, 2021 10:30am      Disposition: Home      Facts about COVID19 at www.cdc.gov/COVID19 and www.[place].gov/covid19    Keeping hands clean is one of the most important steps we can take to avoid getting sick and spreading germs to others.  Please wash your hands frequently and lather with soap for at least 20 seconds!      Resources:     Resources for on line recovery meetings:      *due to covid-19 AA/NA meetings are being held online*      AA meetings can be found online; search for them at: http://aaToto Communicationsintergroup.org/directory.php  AA meetings via ZOOM for [place] area can be found online at: https://aaInvitedHomeneapolWeTOWNS.org/find-a-meeting/holiday-closings/  NA meetings via [place]OM for [place] area can be found online at: https://sites.PlanetEye.com/view/[place]regionofnarcoticsanonymous/home?authuser=2    Www.Datalink  has online resources for meeting and recovery care including Podcast \"Let's Talk:Addiction & Recovery Podcasts    Www.[place]recovery.org     DISCHARGE RESOURCES:  -SMART Recovery - self management for addiction recovery:  www.smartrecovery.org    -Pathways ~ A Health Crisis Resource & Support Center: 237.912.2580.  -[facility] [phone]   -[facility] 581-762-7157 or 725-652-5887.  -Suicide Awareness Voices of Education (SAVE) (www.save.org): 393-735-RHMZ (1031)  -National Suicide Prevention Line (www.mentalhealthmn.org): 547-321-EWMB (0047)  -National [place] on Mental Illness (www.[place].kevin.org): 542.458.7234 or 245-291-3339.  -Tlpn0wyju: text the word LIFE to 22043 for immediate support and crisis intervention  -Mental Health Consumer/Survivor Network of [place] (www.mhcsn.net): 928-412-9878 or 185-633-5973  -Mental Health Association of [place] (www.mentalhealth.org): 781.818.8767 or 238-579-7798     -Substance " Abuse and Mental Health Services (www.samhsa.gov)  -Harm Reduction Coalition (www. Harmreduction.org)  -www.prescribetoprevent.org or http://prescribetoprevent.org/video  -Poison control 1-323.507.6422   **[place] Opioid Prevention Coalition: www.opioidcoalition.org    Sober Support Group Information:  AA/NA & Sponsor/Support  -Alcoholics Anonymous (www.alcoholics-anonymous.org): for local information 24 hours/day  -AA Intergroup service office in [place] (http://www.aastpaul.org/) [phone]  -AA Intergroup service office in [facility]: 527.180.2310. (http://www.aamin[place].org/)  -Narcotics Anonymous (www.naminn[place].org) (585) 900-1676   **Sober Fun Activities: www.soberStadiumPark AppactivitiesConnexient/[facility]//[facility] Recovery Connection ([facility])  [facility] connects people seeking recovery to resources that help foster and sustain long-term recovery.  Whether you are seeking resources for treatment, transportation, housing, job training, education, health care or other pathways to recovery, [facility] is a great place to start.    Phone: 241.369.1121.  www.[facility]..[employer] (Great listing of all types of recovery and non-recovery related resources)      General Medication Instructions:   See your medication sheet(s) for instructions.   Take all medicines as directed.  Make no changes unless your doctor suggests them.   Go to all your doctor visits.  Be sure to have all your required lab tests. This way, your medicines can be refilled on time.  Do not use any drugs not prescribed by your provider.  AA/NA and Sponsors are excellent resources for support  Avoid alcohol.    Any follow up concerns:  Nursing questions call the Unit -[facility] [phone]  Medical Record call [phone]  Outpatient Behavioral Intake call [phone]  LP+ Wait List/Bed Availability call [phone]    The entire treatment team has appreciated the opportunity to work with alonzo Flores.  We wish you the best in the future  and with your lifelong recovery goals. Please bring this discharge folder with you to all follow up appointments.  It contains your lab results, diagnosis, medication list and discharge recommendations.    THANK YOU FOR CHOOSING THE [facility]

## 2021-10-08 NOTE — DISCHARGE SUMMARY
"Psychiatric Discharge Summary    Mark Berumen MRN# [id number]   Age: 56 year old YOB: 1965     Date of Admission:  10/4/2021  Date of Discharge:  10/8/2021  1:15 PM  Admitting Physician:  Rizwan Hernandez MD  Discharge Physician:  Jennifer Melgar DO         Event Leading to Hospitalization:   The patient is a 55yo male with a history of alcohol use disorder who was admitted to detoxify from alcohol. Says that he feels \"shaky\" and his blood pressure has increased. Some nausea. Didn't sleep well last night. Asks if he can be on Valium instead of Ativan. Is open to CD treatment. Is open to anti-craving medications. Mood is anxious. Denies any SI.       Per ER:  Mark Berumen is a 56 year old male who presents seeking alcohol detox. Patient had recent attempt at detox. Afterwards he relapsed, has been drinking anywhere from a pint to a quart of vodka daily for the past week. Last drank 4-5 hours ago. Notes prior history of alcohol withdrawal seizure in 2015. No history of DTs. No other substance abuse.  He also has a history of asthma, feels this is flaring.  He is on albuterol, which has been helpful at controlling some of his symptoms.  He reports feeling increased wheezing similar to prior asthma attacks and chest discomfort over past few days. No fevers. No abdominal pain. Endorses diarrhea for the past week and vomiting for the past 2 days.  He also had recent fall with injury to right hand, found to have nondisplaced fracture, was placed in a splint.  No other injuries from falls.  No suicidal or homicidal ideation.        See Admission note by Rizwan Hernandez MD on 10/5/21 for additional details.          Diagnoses:     Alcohol use disorder  Alcohol withdrawal with unspecified complication   MDD, recurrent, moderate         Labs:     Recent Results (from the past 168 hour(s))   Alcohol breath test POCT    Collection Time: 10/04/21  3:35 PM   Result Value Ref Range    Alcohol Breath Test 0.282 " (A) 0.00 - 0.01   Drug abuse screen 1 urine (ED)    Collection Time: 10/04/21  3:52 PM   Result Value Ref Range    Amphetamines Urine Screen Negative Screen Negative    Barbiturates Urine Screen Negative Screen Negative    Benzodiazepines Urine Screen Positive (A) Screen Negative    Cannabinoids Urine Screen Negative Screen Negative    Cocaine Urine Screen Negative Screen Negative    Opiates Urine Screen Negative Screen Negative   EKG 12-lead, tracing only    Collection Time: 10/04/21  4:25 PM   Result Value Ref Range    Systolic Blood Pressure  mmHg    Diastolic Blood Pressure  mmHg    Ventricular Rate 90 BPM    Atrial Rate 90 BPM    NJ Interval 166 ms    QRS Duration 90 ms     ms    QTc 457 ms    P Axis 51 degrees    R AXIS 34 degrees    T Axis 35 degrees    Interpretation ECG       Sinus rhythm  Septal infarct , age undetermined  Abnormal ECG  Unconfirmed report - interpretation of this ECG is computer generated - see medical record for final interpretation    Confirmed by - EMERGENCY ROOM, PHYSICIAN ([id number]),  VIRAJ SORIANO ([id number]) on 10/5/2021 7:28:11 AM     Asymptomatic COVID-19 Virus (Coronavirus) by PCR Oropharynx    Collection Time: 10/04/21  4:43 PM    Specimen: Oropharynx; Swab   Result Value Ref Range    SARS CoV2 PCR Negative Negative   Comprehensive metabolic panel    Collection Time: 10/04/21  4:47 PM   Result Value Ref Range    Sodium 140 133 - 144 mmol/L    Potassium 3.7 3.4 - 5.3 mmol/L    Chloride 106 94 - 109 mmol/L    Carbon Dioxide (CO2) 28 20 - 32 mmol/L    Anion Gap 6 3 - 14 mmol/L    Urea Nitrogen 10 7 - 30 mg/dL    Creatinine 0.65 (L) 0.66 - 1.25 mg/dL    Calcium 7.9 (L) 8.5 - 10.1 mg/dL    Glucose 87 70 - 99 mg/dL    Alkaline Phosphatase 119 40 - 150 U/L    AST 74 (H) 0 - 45 U/L    ALT 80 (H) 0 - 70 U/L    Protein Total 8.0 6.8 - 8.8 g/dL    Albumin 4.1 3.4 - 5.0 g/dL    Bilirubin Total 0.3 0.2 - 1.3 mg/dL    GFR Estimate >90 >60 mL/min/1.73m2   Troponin I    Collection Time:  10/04/21  4:47 PM   Result Value Ref Range    Troponin I <0.015 0.000 - 0.045 ug/L   CBC with platelets and differential    Collection Time: 10/04/21  4:47 PM   Result Value Ref Range    WBC Count 4.5 4.0 - 11.0 10e3/uL    RBC Count 4.82 4.40 - 5.90 10e6/uL    Hemoglobin 15.0 13.3 - 17.7 g/dL    Hematocrit 45.0 40.0 - 53.0 %    MCV 93 78 - 100 fL    MCH 31.1 26.5 - 33.0 pg    MCHC 33.3 31.5 - 36.5 g/dL    RDW 16.8 (H) 10.0 - 15.0 %    Platelet Count 174 150 - 450 10e3/uL    % Neutrophils 30 %    % Lymphocytes 58 %    % Monocytes 7 %    % Eosinophils 4 %    % Basophils 1 %    % Immature Granulocytes 0 %    NRBCs per 100 WBC 0 <1 /100    Absolute Neutrophils 1.4 (L) 1.6 - 8.3 10e3/uL    Absolute Lymphocytes 2.6 0.8 - 5.3 10e3/uL    Absolute Monocytes 0.3 0.0 - 1.3 10e3/uL    Absolute Eosinophils 0.2 0.0 - 0.7 10e3/uL    Absolute Basophils 0.1 0.0 - 0.2 10e3/uL    Absolute Immature Granulocytes 0.0 <=0.0 10e3/uL    Absolute NRBCs 0.0 10e3/uL   D dimer quantitative    Collection Time: 10/04/21  5:54 PM   Result Value Ref Range    D-Dimer Quantitative 0.35 0.00 - 0.50 ug/mL FEU   UA reflex to Microscopic    Collection Time: 10/05/21 12:30 PM   Result Value Ref Range    Color Urine Yellow Colorless, Straw, Light Yellow, Yellow    Appearance Urine Clear Clear    Glucose Urine Negative Negative mg/dL    Bilirubin Urine Negative Negative    Ketones Urine 100  (A) Negative mg/dL    Specific Gravity Urine 1.033 1.003 - 1.035    Blood Urine Large (A) Negative    pH Urine 6.0 5.0 - 7.0    Protein Albumin Urine 70  (A) Negative mg/dL    Urobilinogen Urine Normal Normal, 2.0 mg/dL    Nitrite Urine Negative Negative    Leukocyte Esterase Urine Negative Negative    Bacteria Urine Few (A) None Seen /HPF    RBC Urine 113 (H) <=2 /HPF    WBC Urine 1 <=5 /HPF    Squamous Epithelials Urine <1 <=1 /HPF    Mucus Urine Present (A) None Seen /LPF   Lipid panel    Collection Time: 10/06/21  7:38 AM   Result Value Ref Range    Cholesterol 314  (H) <200 mg/dL    Triglycerides 232 (H) <150 mg/dL    Direct Measure HDL 81 >=40 mg/dL    LDL Cholesterol Calculated 187 (H) <=100 mg/dL    Non HDL Cholesterol 233 (H) <130 mg/dL   GGT    Collection Time: 10/06/21  7:38 AM   Result Value Ref Range    GGT 95 (H) 0 - 75 U/L   TSH with free T4 reflex and/or T3 as indicated    Collection Time: 10/06/21  7:38 AM   Result Value Ref Range    TSH 3.02 0.40 - 4.00 mU/L              Consults:   [facility]  Consult Note - Hospitalist Service     Date of Admission:  10/4/2021  Consult Requested by: Beto Kaur MD  Reason for Consult: General Medical Evaluation, Co-management of alcohol detox         Assessment & Plan     Mark Berumen is a 56 year old male with PMHx of alcohol use disorder, GERD, asthma and depression who is admitted on 10/4/2021 to inpatient psychiatry with alcohol withdrawal.       # Alcohol use disorder with acute withdrawal - Drinking alcohol daily for the past week since discharge, 1 pint to 1 quart of vodka daily. + withdrawal seizure in past in 2015 in setting of stopping drinking suddenly at home.     - Agree with [facility] protocol, Management per psychiatry team  - Agree with MVI, folic acid and thiamine supplementation      # Depression- PTA on Vortioxetine 20mg daily.   - Management per psychiatry team    # Transaminits - ALT 80, AST 74. Normal T bili and alk phos. Likely due to alcohol use  - Alcohol cessation and repeat LFTs in 1 week with PCP      # Right wrist fracture Fall with subsequent R wrist fracture, on 8/29 in [place] s/p thumb spica splint on R wrist in place. Patient had an appointment at [facility] on 9/14 with orthopedics provider in [place], [place] but he did not follow up. Reports taking off splint due to it getting soiled and wet. States the pain is still present, but improves with tylenol. Denies any numbness or tingling or weakness.  - Repeat xray of R wrist - with continues to show a stable  non-displaced fracture of distal radius. Discussed with orthopedic who recommended that patient continue to wear the soft brace for another two and follow up with orthopedics in outpatient.   - Follow-up up with [facility]  - Tylenol PRN for pain      # Elevated blood pressure  Denies hx of HTN. /116 in setting of alcohol withdrawal. Asymptomatic.   - Clonidine 0.1mg BID PRN for SBP >170 or DBP >110  - Please notify medicine if SBP >170 or DBP >110 or SBP >140 or DBP >90 when out of withdrawal period  - Will need close follow up with PCP for BP monitoring     # GERD- Continue PTA Pantoprazole 40mg daily      # Asthma- On PTA Albuterol PRN. Reports using this daily when he has bad allergies. Requesting refill of inhaler, will send to pharmacy. CXR clear on admission.   - Schedule albuterol QID and PRN  - Please notify medicine if febrile >100.4F, worsening dyspnea, or hypoxia   - Follow up with PCP to discuss maintenance inhaler      # Microscopic hematuria - UA with microscopic hematuria. Patient with chronic microscopic hematuria per chart review. Patient denies any urinary symptoms.   - Referral urology for outpatient follow up and evaluation.            Medicine will sign off. No further recommendations at this time. Please page the on-call NICOL for any intercurrent medical issues which arise.         Victoria Cuevas PA-C  [facility] Course:   Mark Berumen was admitted to Station 3A [place] with attending Rizwan Hernandez MD as a voluntary patient. The patient was placed under status 15 (15 minute checks) to ensure patient safety.   MSSA protocol was initiated due to the patient's history of alcohol abuse and concern for withdrawal symptoms. Labs obtained as above. PTA medications continued. IM consult completed for medical co-management as above.     Mark Berumen did participate in groups and was visible in the milieu.     The  patient's symptoms of alcohol withdrawal improved. He required 80mg of diazepam per [facility] protocol and was considered out of detox lat evening of 10/7. He was hoping to discharge to [facility], no bed available this weekend, referral also placed with [facility]. He spoke with his sister who is sober who has agreed for patient to stay with her until he can admit to [facility] next week and requested to discharge 10/8, discussed benefit of door to door discharge to  treatment and risk of relapse with returning to community, he continued to request discharge.     Today Mark Berumen reports having no thoughts of harming self or others. In addition, he has notable risk factors for self-harm, including age, single status, anxiety and substance abuse. However, risk is mitigated by commitment to family, absence of past attempts and ability to volunteer a safety plan. Therefore, based on all available evidence including the factors cited above, he does not appear to be at imminent risk for self-harm, does not meet criteria for a 72-hr hold, and therefore remains appropriate for ongoing outpatient level of care.     Mark Berumen was discharged to home. At the time of discharge Mark Berumen was determined to not be a danger to himself or others.          Discharge Medications:     Current Discharge Medication List      START taking these medications    Details   benzocaine (ORAJEL MAXIMUM STRENGTH) 20 % GEL gel Take by mouth 4 times daily as needed for moderate pain    Associated Diagnoses: Tooth pain      ibuprofen (ADVIL/MOTRIN) 600 MG tablet Take 1 tablet (600 mg) by mouth every 6 hours as needed for moderate pain    Associated Diagnoses: Tooth pain      melatonin 3 MG tablet Take 1 tablet (3 mg) by mouth nightly as needed for sleep  Qty: 30 tablet, Refills: 1    Associated Diagnoses: Alcohol dependence with uncomplicated intoxication (H)      naltrexone (DEPADE/REVIA) 50 MG tablet Take 1 tablet (50 mg) by  "mouth daily  Qty: 30 tablet, Refills: 3    Associated Diagnoses: Alcohol dependence with uncomplicated intoxication (H)         CONTINUE these medications which have CHANGED    Details   albuterol (PROAIR HFA/PROVENTIL HFA/VENTOLIN HFA) 108 (90 Base) MCG/ACT inhaler Inhale 2 puffs into the lungs every 6 hours as needed for shortness of breath / dyspnea or wheezing  Qty: 6.7 g, Refills: 1    Comments: Pharmacy may dispense brand covered by insurance (Proair, or proventil or ventolin or generic albuterol inhaler)  Associated Diagnoses: Mild intermittent asthma, uncomplicated      folic acid (FOLVITE) 1 MG tablet Take 1 tablet (1 mg) by mouth daily  Qty: 30 tablet, Refills: 1    Associated Diagnoses: Chemical dependency (H)      multivitamin w/minerals (THERA-VIT-M) tablet Take 1 tablet by mouth daily  Qty: 30 tablet, Refills: 1    Associated Diagnoses: Chemical dependency (H)      nicotine (NICORETTE) 2 MG gum Place 1 each (2 mg) inside cheek every hour as needed for other (nicotine withdrawal symptoms)  Qty: 100 each, Refills: 1    Associated Diagnoses: Alcohol dependence with uncomplicated intoxication (H)      pantoprazole (PROTONIX) 40 MG EC tablet Take 1 tablet (40 mg) by mouth daily  Qty: 30 tablet, Refills: 0    Associated Diagnoses: Alcohol dependence with uncomplicated intoxication (H)      vortioxetine (TRINTELLIX) 20 MG tablet Take 1 tablet (20 mg) by mouth daily  Qty: 30 tablet, Refills: 1    Associated Diagnoses: Alcohol dependence with uncomplicated intoxication (H)         STOP taking these medications       thiamine (B-1) 100 MG tablet Comments:   Reason for Stopping:         traZODone (DESYREL) 100 MG tablet Comments:   Reason for Stopping:                    Psychiatric Examination:   Appearance:  awake, alert and adequately groomed  Attitude:  cooperative  Eye Contact:  good  Mood:  \"pretty good\"  Affect:  appropriate and in normal range and mood congruent  Speech:  clear, coherent and normal " prosody  Psychomotor Behavior:  no evidence of tardive dyskinesia, dystonia, or tics  Thought Process:  linear, goal oriented  Associations:  no loose associations  Thought Content:  no evidence of suicidal ideation or homicidal ideation and no evidence of psychotic thought  Insight:  partial  Judgment:  fair, adequate for safety   Oriented to:  time, person, and place  Attention Span and Concentration:  intact  Recent and Remote Memory:  intact  Language: English, fluent  Fund of Knowledge: appropriate  Muscle Strength and Tone: normal  Gait and Station: Normal         Discharge Plan:     Summary: You were admitted to Station 3A on 10/5/21 for detoxification from alcohol.  A medical exam was performed that included lab work. You have met with a  and opted to enter [facility].  Please take care and make your recovery a priority, Mark!     You are scheduled for admission with [facility] on Wednesday, October 13th at 9:00 AM.  The program will call you on your cell phone for a brief interview prior to this date.  You must be sober for admission.  [address] ? ?[place] [address]  MEN'S RESIDENTIAL SUBSTANCE USE TREATMENT  Phone: [phone]      Primary Provider:  Dr. Freire  [facility] location is located near the intersection of [address] in [place], [place].  [address], [address]  [place] [address]  [phone]     Appointment: Nov. 27, 2021 10:30am        Disposition: Home      Attestation:  Patient has been seen and evaluated by me, Jennifer Melgar DO on day of discharge. 32 minutes were spent in coordination of discharge planning.

## 2024-06-03 NOTE — PROGRESS NOTES
07/21/18 0256   Patient Belongings   Did you bring any home meds/supplements to the hospital?  No   Patient Belongings other (see comments)   Disposition of Belongings Other (see comment)   Belongings Search Yes   Clothing Search Yes   Second Staff INGRID ALICIA     Pt Locker:  Samsung phone  Clothing / belt  Watch  Cigs/ lighter  Black wallet - [place] ID    Security Envelope [id number]: Get [phone], Check # [id number] $30, $26.00 Cash    A               Admission:  I am responsible for any personal items that are not sent to the safe or pharmacy.  [place] is not responsible for loss, theft or damage of any property in my possession.    Signature:  _________________________________ Date: _______  Time: _____                                              Staff Signature:  ____________________________ Date: ________  Time: _____      2nd Staff person, if patient is unable/unwilling to sign:    Signature: ________________________________ Date: ________  Time: _____     Discharge:  [facility] has returned all of my personal belongings:    Signature: _________________________________ Date: ________  Time: _____                                          Staff Signature:  ____________________________ Date: ________  Time: _____        No